# Patient Record
Sex: FEMALE | Race: WHITE | NOT HISPANIC OR LATINO | Employment: OTHER | URBAN - METROPOLITAN AREA
[De-identification: names, ages, dates, MRNs, and addresses within clinical notes are randomized per-mention and may not be internally consistent; named-entity substitution may affect disease eponyms.]

---

## 2017-01-11 ENCOUNTER — HOSPITAL ENCOUNTER (OUTPATIENT)
Dept: ONCOLOGY | Facility: CLINIC | Age: 77
Discharge: HOME | End: 2017-01-11
Attending: INTERNAL MEDICINE | Admitting: INTERNAL MEDICINE

## 2017-01-11 LAB
ALBUMIN SERPL-MCNC: 3.7 G/DL (ref 3.5–4.8)
ALBUMIN/GLOB SERPL: 1.4 {RATIO} (ref 1–1.7)
ALP SERPL-CCNC: 36 IU/L (ref 32–91)
ALT SERPL-CCNC: 13 IU/L (ref 14–54)
ANION GAP SERPL CALC-SCNC: 12.1 MMOL/L (ref 10–20)
AST SERPL-CCNC: 19 IU/L (ref 15–41)
BILIRUB SERPL-MCNC: 0.8 MG/DL (ref 0.3–1.2)
BUN SERPL-MCNC: 18 MG/DL (ref 8–20)
BUN/CREAT SERPL: 15 (ref 5.4–26.2)
CALCIUM SERPL-MCNC: 9.3 MG/DL (ref 8.9–10.3)
CHLORIDE SERPL-SCNC: 107 MMOL/L (ref 101–111)
CONV CO2: 23 MMOL/L (ref 22–32)
CONV TOTAL PROTEIN: 6.4 G/DL (ref 6.1–7.9)
CREAT UR-MCNC: 1.2 MG/DL (ref 0.4–1)
GLOBULIN UR ELPH-MCNC: 2.7 G/DL (ref 2.5–3.8)
GLUCOSE SERPL-MCNC: 90 MG/DL (ref 65–99)
POTASSIUM SERPL-SCNC: 4.1 MMOL/L (ref 3.6–5.1)
SODIUM SERPL-SCNC: 138 MMOL/L (ref 136–144)

## 2017-01-14 ENCOUNTER — HOSPITAL ENCOUNTER (OUTPATIENT)
Dept: LAB | Facility: HOSPITAL | Age: 77
Discharge: HOME | End: 2017-01-14
Attending: INTERNAL MEDICINE | Admitting: INTERNAL MEDICINE

## 2017-01-14 LAB
ANION GAP SERPL CALC-SCNC: 11.1 MMOL/L (ref 10–20)
BASOPHILS # BLD AUTO: 0 10*3/UL (ref 0–0.2)
BASOPHILS NFR BLD AUTO: 1 % (ref 0–2)
BILIRUB UR QL STRIP: NEGATIVE MG/DL
BUN SERPL-MCNC: 15 MG/DL (ref 8–20)
BUN/CREAT SERPL: 11.5 (ref 5.4–26.2)
CALCIUM SERPL-MCNC: 9.4 MG/DL (ref 8.9–10.3)
CASTS URNS QL MICRO: ABNORMAL /[LPF]
CHLORIDE SERPL-SCNC: 104 MMOL/L (ref 101–111)
COLOR UR: YELLOW
CONV BACTERIA IN URINE MICRO: NEGATIVE
CONV CLARITY OF URINE: ABNORMAL
CONV CO2: 26 MMOL/L (ref 22–32)
CONV HYALINE CASTS IN URINE MICRO: 2 /[LPF] (ref 0–5)
CONV PROTEIN IN URINE BY AUTOMATED TEST STRIP: NEGATIVE MG/DL
CONV SMALL ROUND CELLS: ABNORMAL /[HPF]
CONV UROBILINOGEN IN URINE BY AUTOMATED TEST STRIP: 0.2 MG/DL
CREAT 24H UR-MCNC: 106.1 MG/DL
CREAT UR-MCNC: 1.3 MG/DL (ref 0.4–1)
CULTURE INDICATED?: ABNORMAL
DIFFERENTIAL METHOD BLD: (no result)
EOSINOPHIL # BLD AUTO: 0.1 10*3/UL (ref 0–0.3)
EOSINOPHIL # BLD AUTO: 2 % (ref 0–3)
ERYTHROCYTE [DISTWIDTH] IN BLOOD BY AUTOMATED COUNT: 13.6 % (ref 11.5–14.5)
GLUCOSE SERPL-MCNC: 106 MG/DL (ref 65–99)
GLUCOSE UR QL: NEGATIVE MG/DL
HCT VFR BLD AUTO: 40.4 % (ref 35–49)
HGB BLD-MCNC: 13.3 G/DL (ref 12–15)
HGB UR QL STRIP: NEGATIVE
KETONES UR QL STRIP: NEGATIVE MG/DL
LEUKOCYTE ESTERASE UR QL STRIP: NEGATIVE
LYMPHOCYTES # BLD AUTO: 0.9 10*3/UL (ref 0.8–4.8)
LYMPHOCYTES NFR BLD AUTO: 21 % (ref 18–42)
MCH RBC QN AUTO: 28.9 PG (ref 26–32)
MCHC RBC AUTO-ENTMCNC: 33 G/DL (ref 32–36)
MCV RBC AUTO: 87.7 FL (ref 80–94)
MONOCYTES # BLD AUTO: 0.4 10*3/UL (ref 0.1–1.3)
MONOCYTES NFR BLD AUTO: 10 % (ref 2–11)
NEUTROPHILS # BLD AUTO: 2.9 10*3/UL (ref 2.3–8.6)
NEUTROPHILS NFR BLD AUTO: 66 % (ref 50–75)
NITRITE UR QL STRIP: NEGATIVE
NRBC BLD AUTO-RTO: 0 /100{WBCS}
NRBC/RBC NFR BLD MANUAL: 0 10*3/UL
PH UR STRIP.AUTO: 5.5 [PH] (ref 4.5–8)
PHOSPHATE SERPL-MCNC: 3.6 MG/DL (ref 2.4–4.7)
PLATELET # BLD AUTO: 186 10*3/UL (ref 150–450)
PMV BLD AUTO: 8.5 FL (ref 7.4–10.4)
POTASSIUM SERPL-SCNC: 4.1 MMOL/L (ref 3.6–5.1)
PROT UR-MCNC: 4 MG/DL
RBC # BLD AUTO: 4.61 10*6/UL (ref 4–5.4)
RBC #/AREA URNS HPF: 5 /[HPF] (ref 0–3)
SODIUM SERPL-SCNC: 137 MMOL/L (ref 136–144)
SP GR UR: 1.01 (ref 1–1.03)
SPERM URNS QL MICRO: ABNORMAL /[HPF]
SQUAMOUS SPT QL MICRO: 5 /[HPF] (ref 0–5)
UNIDENT CRYS URNS QL MICRO: ABNORMAL /[HPF]
WBC # BLD AUTO: 4.4 10*3/UL (ref 4.5–11.5)
WBC #/AREA URNS HPF: 3 /[HPF] (ref 0–5)
YEAST SPEC QL WET PREP: ABNORMAL /[HPF]

## 2017-01-16 LAB — PTH-INTACT SERPL-MCNC: 67 PG/ML (ref 11–72)

## 2017-02-09 ENCOUNTER — HOSPITAL ENCOUNTER (OUTPATIENT)
Dept: ONCOLOGY | Facility: CLINIC | Age: 77
Discharge: HOME | End: 2017-02-09
Attending: INTERNAL MEDICINE | Admitting: INTERNAL MEDICINE

## 2017-03-09 ENCOUNTER — HOSPITAL ENCOUNTER (OUTPATIENT)
Dept: ONCOLOGY | Facility: CLINIC | Age: 77
Discharge: HOME | End: 2017-03-09
Attending: INTERNAL MEDICINE | Admitting: INTERNAL MEDICINE

## 2017-04-06 ENCOUNTER — HOSPITAL ENCOUNTER (OUTPATIENT)
Dept: ONCOLOGY | Facility: CLINIC | Age: 77
Discharge: HOME | End: 2017-04-06
Attending: INTERNAL MEDICINE | Admitting: INTERNAL MEDICINE

## 2017-05-04 ENCOUNTER — HOSPITAL ENCOUNTER (OUTPATIENT)
Dept: ONCOLOGY | Facility: CLINIC | Age: 77
Discharge: HOME | End: 2017-05-04
Attending: INTERNAL MEDICINE | Admitting: INTERNAL MEDICINE

## 2017-05-15 ENCOUNTER — CONVERSION ENCOUNTER (OUTPATIENT)
Dept: SURGERY | Facility: CLINIC | Age: 77
End: 2017-05-15

## 2017-05-17 ENCOUNTER — HOSPITAL ENCOUNTER (OUTPATIENT)
Dept: CARDIOLOGY | Facility: HOSPITAL | Age: 77
Discharge: HOME | End: 2017-05-17
Attending: INTERNAL MEDICINE | Admitting: INTERNAL MEDICINE

## 2017-06-08 ENCOUNTER — HOSPITAL ENCOUNTER (OUTPATIENT)
Dept: ONCOLOGY | Facility: CLINIC | Age: 77
Discharge: HOME | End: 2017-06-08
Attending: INTERNAL MEDICINE | Admitting: INTERNAL MEDICINE

## 2017-07-06 ENCOUNTER — HOSPITAL ENCOUNTER (OUTPATIENT)
Dept: ONCOLOGY | Facility: CLINIC | Age: 77
Discharge: HOME | End: 2017-07-06
Attending: INTERNAL MEDICINE | Admitting: INTERNAL MEDICINE

## 2017-07-12 ENCOUNTER — CONVERSION ENCOUNTER (OUTPATIENT)
Dept: SURGERY | Facility: CLINIC | Age: 77
End: 2017-07-12

## 2017-07-24 ENCOUNTER — HOSPITAL ENCOUNTER (OUTPATIENT)
Dept: ONCOLOGY | Facility: CLINIC | Age: 77
Discharge: HOME | End: 2017-07-24
Attending: INTERNAL MEDICINE | Admitting: INTERNAL MEDICINE

## 2017-08-03 ENCOUNTER — HOSPITAL ENCOUNTER (OUTPATIENT)
Dept: ONCOLOGY | Facility: CLINIC | Age: 77
Discharge: HOME | End: 2017-08-03
Attending: INTERNAL MEDICINE | Admitting: INTERNAL MEDICINE

## 2017-09-07 ENCOUNTER — CLINICAL SUPPORT (OUTPATIENT)
Dept: ONCOLOGY | Facility: HOSPITAL | Age: 77
End: 2017-09-07

## 2017-09-07 ENCOUNTER — HOSPITAL ENCOUNTER (OUTPATIENT)
Dept: ONCOLOGY | Facility: CLINIC | Age: 77
Setting detail: INFUSION SERIES
Discharge: HOME | End: 2017-09-07
Attending: INTERNAL MEDICINE | Admitting: INTERNAL MEDICINE

## 2017-09-07 ENCOUNTER — HOSPITAL ENCOUNTER (OUTPATIENT)
Dept: ONCOLOGY | Facility: HOSPITAL | Age: 77
Discharge: HOME | End: 2017-09-07
Attending: INTERNAL MEDICINE | Admitting: INTERNAL MEDICINE

## 2017-09-07 NOTE — PROGRESS NOTES
PATIENTS ONCOLOGY RECORD LOCATED IN [facility]      Subjective     Name:  ORIN BELTRE     Date:  2017  Address:  [address] [place] [address] [place] [facility]  Home: [phone]  :  1940 AGE:  76 y.o.        RECORDS OBTAINED:  Patients Oncology Record is located in [facility]

## 2017-10-05 ENCOUNTER — HOSPITAL ENCOUNTER (OUTPATIENT)
Dept: ONCOLOGY | Facility: CLINIC | Age: 77
Setting detail: INFUSION SERIES
Discharge: HOME | End: 2017-10-05
Attending: INTERNAL MEDICINE | Admitting: INTERNAL MEDICINE

## 2017-10-05 ENCOUNTER — HOSPITAL ENCOUNTER (OUTPATIENT)
Dept: ONCOLOGY | Facility: HOSPITAL | Age: 77
Discharge: HOME | End: 2017-10-05
Attending: INTERNAL MEDICINE | Admitting: INTERNAL MEDICINE

## 2017-10-05 ENCOUNTER — CLINICAL SUPPORT (OUTPATIENT)
Dept: ONCOLOGY | Facility: HOSPITAL | Age: 77
End: 2017-10-05

## 2017-10-05 NOTE — PROGRESS NOTES
PATIENTS ONCOLOGY RECORD LOCATED IN [facility]      Subjective     Name:  ORIN BELTRE     Date:  10/05/2017  Address:  [address] [place] [address] [place] [facility]  Home: [phone]  :  1940 AGE:  76 y.o.        RECORDS OBTAINED:  Patients Oncology Record is located in [facility]

## 2017-10-17 ENCOUNTER — HOSPITAL ENCOUNTER (OUTPATIENT)
Dept: LAB | Facility: HOSPITAL | Age: 77
Discharge: HOME | End: 2017-10-17
Attending: INTERNAL MEDICINE | Admitting: INTERNAL MEDICINE

## 2017-10-17 LAB
ALBUMIN SERPL-MCNC: 4 G/DL (ref 3.5–4.8)
ANION GAP SERPL CALC-SCNC: 13.4 MMOL/L (ref 10–20)
BASOPHILS # BLD AUTO: 0 10*3/UL (ref 0–0.2)
BASOPHILS NFR BLD AUTO: 1 % (ref 0–2)
BILIRUB UR QL STRIP: NEGATIVE MG/DL
BUN SERPL-MCNC: 15 MG/DL (ref 8–20)
BUN/CREAT SERPL: 12.5 (ref 5.4–26.2)
CALCIUM SERPL-MCNC: 9.5 MG/DL (ref 8.9–10.3)
CASTS URNS QL MICRO: NORMAL /[LPF]
CHLORIDE SERPL-SCNC: 102 MMOL/L (ref 101–111)
COLOR UR: YELLOW
CONV BACTERIA IN URINE MICRO: NEGATIVE
CONV CLARITY OF URINE: CLEAR
CONV CO2: 25 MMOL/L (ref 22–32)
CONV HYALINE CASTS IN URINE MICRO: 0 /[LPF] (ref 0–5)
CONV PROTEIN IN URINE BY AUTOMATED TEST STRIP: NEGATIVE MG/DL
CONV SMALL ROUND CELLS: NORMAL /[HPF]
CONV UROBILINOGEN IN URINE BY AUTOMATED TEST STRIP: 0.2 MG/DL
CREAT 24H UR-MCNC: 18 MG/DL
CREAT UR-MCNC: 1.2 MG/DL (ref 0.4–1)
CULTURE INDICATED?: NORMAL
DIFFERENTIAL METHOD BLD: (no result)
EOSINOPHIL # BLD AUTO: 0.1 10*3/UL (ref 0–0.3)
EOSINOPHIL # BLD AUTO: 2 % (ref 0–3)
ERYTHROCYTE [DISTWIDTH] IN BLOOD BY AUTOMATED COUNT: 14.2 % (ref 11.5–14.5)
GLUCOSE SERPL-MCNC: 92 MG/DL (ref 65–99)
GLUCOSE UR QL: NEGATIVE MG/DL
HCT VFR BLD AUTO: 41.8 % (ref 35–49)
HGB BLD-MCNC: 13.9 G/DL (ref 12–15)
HGB UR QL STRIP: NEGATIVE
KETONES UR QL STRIP: NEGATIVE MG/DL
LEUKOCYTE ESTERASE UR QL STRIP: NEGATIVE
LYMPHOCYTES # BLD AUTO: 0.7 10*3/UL (ref 0.8–4.8)
LYMPHOCYTES NFR BLD AUTO: 14 % (ref 18–42)
MCH RBC QN AUTO: 30.1 PG (ref 26–32)
MCHC RBC AUTO-ENTMCNC: 33.3 G/DL (ref 32–36)
MCV RBC AUTO: 90.3 FL (ref 80–94)
MONOCYTES # BLD AUTO: 0.4 10*3/UL (ref 0.1–1.3)
MONOCYTES NFR BLD AUTO: 8 % (ref 2–11)
NEUTROPHILS # BLD AUTO: 4.2 10*3/UL (ref 2.3–8.6)
NEUTROPHILS NFR BLD AUTO: 75 % (ref 50–75)
NITRITE UR QL STRIP: NEGATIVE
NRBC BLD AUTO-RTO: 0 /100{WBCS}
NRBC/RBC NFR BLD MANUAL: 0 10*3/UL
PH UR STRIP.AUTO: 6 [PH] (ref 4.5–8)
PHOSPHATE SERPL-MCNC: ABNORMAL MG/DL (ref 2.4–4.7)
PLATELET # BLD AUTO: 211 10*3/UL (ref 150–450)
PMV BLD AUTO: 9.1 FL (ref 7.4–10.4)
POTASSIUM SERPL-SCNC: ABNORMAL MMOL/L (ref 3.6–5.1)
PROT UR-MCNC: <2 MG/DL
RBC # BLD AUTO: 4.63 10*6/UL (ref 4–5.4)
RBC #/AREA URNS HPF: 0 /[HPF] (ref 0–3)
SODIUM SERPL-SCNC: 136 MMOL/L (ref 136–144)
SP GR UR: 1.01 (ref 1–1.03)
SPERM URNS QL MICRO: NORMAL /[HPF]
SQUAMOUS SPT QL MICRO: 0 /[HPF] (ref 0–5)
UNIDENT CRYS URNS QL MICRO: NORMAL /[HPF]
WBC # BLD AUTO: 5.5 10*3/UL (ref 4.5–11.5)
WBC #/AREA URNS HPF: 0 /[HPF] (ref 0–5)
YEAST SPEC QL WET PREP: NORMAL /[HPF]

## 2017-11-02 ENCOUNTER — HOSPITAL ENCOUNTER (OUTPATIENT)
Dept: ONCOLOGY | Facility: HOSPITAL | Age: 77
Discharge: HOME | End: 2017-11-02
Attending: INTERNAL MEDICINE | Admitting: INTERNAL MEDICINE

## 2017-11-02 ENCOUNTER — CLINICAL SUPPORT (OUTPATIENT)
Dept: ONCOLOGY | Facility: HOSPITAL | Age: 77
End: 2017-11-02

## 2017-11-02 ENCOUNTER — HOSPITAL ENCOUNTER (OUTPATIENT)
Dept: ONCOLOGY | Facility: CLINIC | Age: 77
Setting detail: INFUSION SERIES
Discharge: HOME | End: 2017-11-02
Attending: INTERNAL MEDICINE | Admitting: INTERNAL MEDICINE

## 2017-11-02 NOTE — PROGRESS NOTES
PATIENTS ONCOLOGY RECORD LOCATED IN [facility]      Subjective     Name:  ORIN BELTRE     Date:  2017  Address:  [address] [place] [place] [address] [place] [facility]  Home: [phone]  :  1940 AGE:  76 y.o.        RECORDS OBTAINED:  Patients Oncology Record is located in [facility]

## 2017-12-07 ENCOUNTER — CLINICAL SUPPORT (OUTPATIENT)
Dept: ONCOLOGY | Facility: HOSPITAL | Age: 77
End: 2017-12-07

## 2017-12-07 ENCOUNTER — HOSPITAL ENCOUNTER (OUTPATIENT)
Dept: ONCOLOGY | Facility: CLINIC | Age: 77
Setting detail: INFUSION SERIES
Discharge: HOME | End: 2017-12-07
Attending: INTERNAL MEDICINE | Admitting: INTERNAL MEDICINE

## 2017-12-07 ENCOUNTER — HOSPITAL ENCOUNTER (OUTPATIENT)
Dept: ONCOLOGY | Facility: HOSPITAL | Age: 77
Discharge: HOME | End: 2017-12-07
Attending: INTERNAL MEDICINE | Admitting: INTERNAL MEDICINE

## 2017-12-07 NOTE — PROGRESS NOTES
PATIENTS ONCOLOGY RECORD LOCATED IN [facility]      Subjective     Name:  ORIN BELTRE     Date:  2017  Address:  [address] [place] [address] [place] [facility]  Home: [phone]  :  1940 AGE:  77 y.o.        RECORDS OBTAINED:  Patients Oncology Record is located in [facility]

## 2018-01-11 ENCOUNTER — HOSPITAL ENCOUNTER (OUTPATIENT)
Dept: ONCOLOGY | Facility: CLINIC | Age: 78
Setting detail: INFUSION SERIES
Discharge: HOME | End: 2018-01-11
Attending: INTERNAL MEDICINE | Admitting: INTERNAL MEDICINE

## 2018-01-11 ENCOUNTER — CLINICAL SUPPORT (OUTPATIENT)
Dept: ONCOLOGY | Facility: HOSPITAL | Age: 78
End: 2018-01-11

## 2018-01-11 ENCOUNTER — HOSPITAL ENCOUNTER (OUTPATIENT)
Dept: ONCOLOGY | Facility: HOSPITAL | Age: 78
Discharge: HOME | End: 2018-01-11
Attending: INTERNAL MEDICINE | Admitting: INTERNAL MEDICINE

## 2018-01-11 LAB
ALBUMIN SERPL-MCNC: 3.5 G/DL (ref 3.5–4.8)
ALBUMIN/GLOB SERPL: 1.4 {RATIO} (ref 1–1.7)
ALP SERPL-CCNC: 34 IU/L (ref 32–91)
ALT SERPL-CCNC: 13 IU/L (ref 14–54)
ANION GAP SERPL CALC-SCNC: 11.2 MMOL/L (ref 10–20)
AST SERPL-CCNC: 22 IU/L (ref 15–41)
BILIRUB SERPL-MCNC: 0.4 MG/DL (ref 0.3–1.2)
BUN SERPL-MCNC: 16 MG/DL (ref 8–20)
BUN/CREAT SERPL: 11.4 (ref 5.4–26.2)
CALCIUM SERPL-MCNC: 9.2 MG/DL (ref 8.9–10.3)
CHLORIDE SERPL-SCNC: 107 MMOL/L (ref 101–111)
CONV CO2: 26 MMOL/L (ref 22–32)
CONV TOTAL PROTEIN: 6 G/DL (ref 6.1–7.9)
CREAT UR-MCNC: 1.4 MG/DL (ref 0.4–1)
GLOBULIN UR ELPH-MCNC: 2.5 G/DL (ref 2.5–3.8)
GLUCOSE SERPL-MCNC: 82 MG/DL (ref 65–99)
POTASSIUM SERPL-SCNC: 4.2 MMOL/L (ref 3.6–5.1)
SODIUM SERPL-SCNC: 140 MMOL/L (ref 136–144)

## 2018-01-11 NOTE — PROGRESS NOTES
PATIENTS ONCOLOGY RECORD LOCATED IN [facility]      Subjective     Name:  ORIN BELTRE     Date:  2018  Address:  [address] [place] [address] [place] [facility]  Home: [phone]  :  1940 AGE:  77 y.o.        RECORDS OBTAINED:  Patients Oncology Record is located in [facility]

## 2018-03-08 ENCOUNTER — CONVERSION ENCOUNTER (OUTPATIENT)
Dept: SURGERY | Facility: CLINIC | Age: 78
End: 2018-03-08

## 2018-06-11 ENCOUNTER — HOSPITAL ENCOUNTER (OUTPATIENT)
Dept: ONCOLOGY | Facility: CLINIC | Age: 78
Setting detail: INFUSION SERIES
Discharge: HOME | End: 2018-06-11
Attending: INTERNAL MEDICINE | Admitting: INTERNAL MEDICINE

## 2018-06-11 ENCOUNTER — CLINICAL SUPPORT (OUTPATIENT)
Dept: ONCOLOGY | Facility: HOSPITAL | Age: 78
End: 2018-06-11

## 2018-07-02 ENCOUNTER — HOSPITAL ENCOUNTER (OUTPATIENT)
Dept: ONCOLOGY | Facility: HOSPITAL | Age: 78
Discharge: HOME | End: 2018-07-02
Attending: INTERNAL MEDICINE | Admitting: INTERNAL MEDICINE

## 2018-09-14 ENCOUNTER — HOSPITAL ENCOUNTER (OUTPATIENT)
Dept: ONCOLOGY | Facility: CLINIC | Age: 78
Setting detail: INFUSION SERIES
Discharge: HOME | End: 2018-09-14
Attending: INTERNAL MEDICINE | Admitting: INTERNAL MEDICINE

## 2018-09-14 ENCOUNTER — CLINICAL SUPPORT (OUTPATIENT)
Dept: ONCOLOGY | Facility: HOSPITAL | Age: 78
End: 2018-09-14

## 2018-09-14 NOTE — PROGRESS NOTES
PATIENTS ONCOLOGY RECORD LOCATED IN [facility]      Subjective     Name:  ORIN BELTRE     Date:  2018  Address:  [address] [place] [address] [place] [facility]  Home: [phone]  :  1940 AGE:  77 y.o.        RECORDS OBTAINED:  Patients Oncology Record is located in [facility]

## 2018-10-15 ENCOUNTER — CLINICAL SUPPORT (OUTPATIENT)
Dept: ONCOLOGY | Facility: HOSPITAL | Age: 78
End: 2018-10-15

## 2018-10-15 ENCOUNTER — HOSPITAL ENCOUNTER (OUTPATIENT)
Dept: ONCOLOGY | Facility: CLINIC | Age: 78
Setting detail: INFUSION SERIES
Discharge: HOME | End: 2018-10-15
Attending: INTERNAL MEDICINE | Admitting: INTERNAL MEDICINE

## 2018-10-15 NOTE — PROGRESS NOTES
PATIENTS ONCOLOGY RECORD LOCATED IN [facility]      Subjective     Name:  ORIN BELTRE     Date:  10/15/2018  Address:  [address] [place] [address] [place] [facility]  Home: [phone]  :  1940 AGE:  77 y.o.        RECORDS OBTAINED:  Patients Oncology Record is located in [facility]

## 2018-11-12 ENCOUNTER — CLINICAL SUPPORT (OUTPATIENT)
Dept: ONCOLOGY | Facility: HOSPITAL | Age: 78
End: 2018-11-12

## 2018-11-12 ENCOUNTER — HOSPITAL ENCOUNTER (OUTPATIENT)
Dept: ONCOLOGY | Facility: CLINIC | Age: 78
Setting detail: INFUSION SERIES
Discharge: HOME | End: 2018-11-12
Attending: INTERNAL MEDICINE | Admitting: INTERNAL MEDICINE

## 2018-11-12 NOTE — PROGRESS NOTES
PATIENTS ONCOLOGY RECORD LOCATED IN [facility]      Subjective     Name:  ORIN BELTRE     Date:  2018  Address:  [address] [place] [address] [place] [facility]  Home: [unfilled]  :  1940 AGE:  77 y.o.        RECORDS OBTAINED:  Patients Oncology Record is located in [facility]

## 2018-11-28 ENCOUNTER — HOSPITAL ENCOUNTER (OUTPATIENT)
Dept: CARDIOLOGY | Facility: HOSPITAL | Age: 78
Discharge: HOME | End: 2018-11-28
Attending: INTERNAL MEDICINE | Admitting: INTERNAL MEDICINE

## 2018-11-28 ENCOUNTER — CONVERSION ENCOUNTER (OUTPATIENT)
Dept: SURGERY | Facility: CLINIC | Age: 78
End: 2018-11-28

## 2018-12-05 ENCOUNTER — HOSPITAL ENCOUNTER (OUTPATIENT)
Dept: LAB | Facility: HOSPITAL | Age: 78
Discharge: HOME | End: 2018-12-05
Attending: INTERNAL MEDICINE | Admitting: INTERNAL MEDICINE

## 2018-12-05 LAB
25(OH)D3 SERPL-MCNC: 26 NG/ML (ref 30–100)
AMPICILLIN SUSC ISLT: ABNORMAL
ANION GAP SERPL CALC-SCNC: 11.3 MMOL/L (ref 10–20)
AZTREONAM SUSC ISLT: ABNORMAL
BACTERIA ISLT: ABNORMAL
BACTERIA SPEC AEROBE CULT: ABNORMAL
BASOPHILS # BLD AUTO: 0.1 10*3/UL (ref 0–0.2)
BASOPHILS NFR BLD AUTO: 1 % (ref 0–2)
BILIRUB UR QL STRIP: NEGATIVE MG/DL
BUN SERPL-MCNC: 13 MG/DL (ref 8–20)
BUN/CREAT SERPL: 10.8 (ref 5.4–26.2)
CALCIUM SERPL-MCNC: 9.4 MG/DL (ref 8.9–10.3)
CASTS URNS QL MICRO: ABNORMAL /[LPF]
CEFAZOLIN SUSC ISLT: ABNORMAL
CEFEPIME SUSC ISLT: ABNORMAL
CEFTRIAXONE SUSC ISLT: ABNORMAL
CHLORIDE SERPL-SCNC: 105 MMOL/L (ref 101–111)
CIPROFLOXACIN SUSC ISLT: ABNORMAL
COLONY COUNT: ABNORMAL
COLOR UR: YELLOW
CONV BACTERIA IN URINE MICRO: ABNORMAL
CONV CLARITY OF URINE: ABNORMAL
CONV CO2: 27 MMOL/L (ref 22–32)
CONV HYALINE CASTS IN URINE MICRO: 0 /[LPF] (ref 0–5)
CONV PROTEIN IN URINE BY AUTOMATED TEST STRIP: NEGATIVE MG/DL
CONV SMALL ROUND CELLS: ABNORMAL /[HPF]
CONV UROBILINOGEN IN URINE BY AUTOMATED TEST STRIP: 0.2 MG/DL
CREAT 24H UR-MCNC: 149.6 MG/DL
CREAT UR-MCNC: 1.2 MG/DL (ref 0.4–1)
CULTURE INDICATED?: ABNORMAL
CULTURE INDICATED?: ABNORMAL
DIFFERENTIAL METHOD BLD: (no result)
EOSINOPHIL # BLD AUTO: 0.1 10*3/UL (ref 0–0.3)
EOSINOPHIL # BLD AUTO: 3 % (ref 0–3)
ERYTHROCYTE [DISTWIDTH] IN BLOOD BY AUTOMATED COUNT: 13.8 % (ref 11.5–14.5)
GLUCOSE SERPL-MCNC: 98 MG/DL (ref 65–99)
GLUCOSE UR QL: NEGATIVE MG/DL
HBA1C MFR BLD: 5.7 % (ref 0–5.6)
HCT VFR BLD AUTO: 40.4 % (ref 35–49)
HGB BLD-MCNC: 13.7 G/DL (ref 12–15)
HGB UR QL STRIP: NEGATIVE
KETONES UR QL STRIP: NEGATIVE MG/DL
LEUKOCYTE ESTERASE UR QL STRIP: ABNORMAL
LEVOFLOXACIN SUSC ISLT: ABNORMAL
LYMPHOCYTES # BLD AUTO: 0.9 10*3/UL (ref 0.8–4.8)
LYMPHOCYTES NFR BLD AUTO: 22 % (ref 18–42)
Lab: ABNORMAL
MCH RBC QN AUTO: 30.8 PG (ref 26–32)
MCHC RBC AUTO-ENTMCNC: 34 G/DL (ref 32–36)
MCV RBC AUTO: 90.7 FL (ref 80–94)
MEROPENEM SUSC ISLT: ABNORMAL
MICRO REPORT STATUS: ABNORMAL
MONOCYTES # BLD AUTO: 0.4 10*3/UL (ref 0.1–1.3)
MONOCYTES NFR BLD AUTO: 9 % (ref 2–11)
NEUTROPHILS # BLD AUTO: 2.7 10*3/UL (ref 2.3–8.6)
NEUTROPHILS NFR BLD AUTO: 65 % (ref 50–75)
NITRITE UR QL STRIP: POSITIVE
NITROFURANTOIN SUSC ISLT: ABNORMAL
NRBC BLD AUTO-RTO: 0 /100{WBCS}
NRBC/RBC NFR BLD MANUAL: 0 10*3/UL
PH UR STRIP.AUTO: 6 [PH] (ref 4.5–8)
PHOSPHATE SERPL-MCNC: 3.7 MG/DL (ref 2.4–4.7)
PIP+TAZO SUSC ISLT: ABNORMAL
PLATELET # BLD AUTO: 194 10*3/UL (ref 150–450)
PMV BLD AUTO: 8.5 FL (ref 7.4–10.4)
POTASSIUM SERPL-SCNC: 4.3 MMOL/L (ref 3.6–5.1)
PROT UR-MCNC: 10 MG/DL
PROT/CREAT UR: 0.1 MG/MG (ref 0–22)
PTH-INTACT SERPL-MCNC: 99 PG/ML (ref 11–72)
RBC # BLD AUTO: 4.45 10*6/UL (ref 4–5.4)
RBC #/AREA URNS HPF: 0 /[HPF] (ref 0–3)
SODIUM SERPL-SCNC: 139 MMOL/L (ref 136–144)
SP GR UR: 1.02 (ref 1–1.03)
SPECIMEN SOURCE: ABNORMAL
SPERM URNS QL MICRO: ABNORMAL /[HPF]
SQUAMOUS SPT QL MICRO: 1 /[HPF] (ref 0–5)
SUSC METH SPEC: ABNORMAL
TETRACYCLINE SUSC ISLT: ABNORMAL
TOBRAMYCIN SUSC ISLT: ABNORMAL
TRIMETHOPRIM/SULFA: ABNORMAL
TSH SERPL-ACNC: 3.88 UIU/ML (ref 0.34–5.6)
UNIDENT CRYS URNS QL MICRO: ABNORMAL /[HPF]
WBC # BLD AUTO: 4.1 10*3/UL (ref 4.5–11.5)
WBC #/AREA URNS HPF: 22 /[HPF] (ref 0–5)
YEAST SPEC QL WET PREP: ABNORMAL /[HPF]

## 2018-12-13 ENCOUNTER — CLINICAL SUPPORT (OUTPATIENT)
Dept: ONCOLOGY | Facility: HOSPITAL | Age: 78
End: 2018-12-13

## 2018-12-13 ENCOUNTER — HOSPITAL ENCOUNTER (OUTPATIENT)
Dept: ONCOLOGY | Facility: CLINIC | Age: 78
Setting detail: INFUSION SERIES
Discharge: HOME | End: 2018-12-13
Attending: INTERNAL MEDICINE | Admitting: INTERNAL MEDICINE

## 2018-12-13 NOTE — PROGRESS NOTES
PATIENTS ONCOLOGY RECORD LOCATED IN [facility]      Subjective     Name:  ORIN BELTRE     Date:  2018  Address:  [address] [place] [address] [place] [facility]  Home: [unfilled]  :  1940 AGE:  78 y.o.        RECORDS OBTAINED:  Patients Oncology Record is located in [facility]

## 2019-01-03 ENCOUNTER — CONVERSION ENCOUNTER (OUTPATIENT)
Dept: SURGERY | Facility: CLINIC | Age: 79
End: 2019-01-03

## 2019-01-07 ENCOUNTER — HOSPITAL ENCOUNTER (OUTPATIENT)
Dept: ONCOLOGY | Facility: HOSPITAL | Age: 79
Discharge: HOME | End: 2019-01-07
Attending: INTERNAL MEDICINE | Admitting: INTERNAL MEDICINE

## 2019-01-07 ENCOUNTER — HOSPITAL ENCOUNTER (OUTPATIENT)
Dept: PREADMISSION TESTING | Facility: HOSPITAL | Age: 79
Discharge: HOME | End: 2019-01-07
Attending: SURGERY | Admitting: SURGERY

## 2019-01-07 ENCOUNTER — HOSPITAL ENCOUNTER (OUTPATIENT)
Dept: ONCOLOGY | Facility: CLINIC | Age: 79
Setting detail: INFUSION SERIES
Discharge: HOME | End: 2019-01-07
Attending: INTERNAL MEDICINE | Admitting: INTERNAL MEDICINE

## 2019-01-07 ENCOUNTER — CLINICAL SUPPORT (OUTPATIENT)
Dept: ONCOLOGY | Facility: HOSPITAL | Age: 79
End: 2019-01-07

## 2019-01-07 LAB
ALBUMIN SERPL-MCNC: 3.5 G/DL (ref 3.5–4.8)
ALBUMIN/GLOB SERPL: 1.5 {RATIO} (ref 1–1.7)
ALP SERPL-CCNC: 36 IU/L (ref 32–91)
ALT SERPL-CCNC: 11 IU/L (ref 14–54)
ANION GAP SERPL CALC-SCNC: 12.7 MMOL/L (ref 10–20)
ANION GAP SERPL CALC-SCNC: 14.2 MMOL/L (ref 10–20)
AST SERPL-CCNC: 22 IU/L (ref 15–41)
BASOPHILS # BLD AUTO: 0.1 10*3/UL (ref 0–0.2)
BASOPHILS NFR BLD AUTO: 1 % (ref 0–2)
BILIRUB SERPL-MCNC: 0.2 MG/DL (ref 0.3–1.2)
BUN SERPL-MCNC: 16 MG/DL (ref 8–20)
BUN SERPL-MCNC: 17 MG/DL (ref 8–20)
BUN/CREAT SERPL: 14.2 (ref 5.4–26.2)
BUN/CREAT SERPL: 16 (ref 5.4–26.2)
CALCIUM SERPL-MCNC: 8.7 MG/DL (ref 8.9–10.3)
CALCIUM SERPL-MCNC: 9.1 MG/DL (ref 8.9–10.3)
CHLORIDE SERPL-SCNC: 103 MMOL/L (ref 101–111)
CHLORIDE SERPL-SCNC: 106 MMOL/L (ref 101–111)
CONV CO2: 23 MMOL/L (ref 22–32)
CONV CO2: 24 MMOL/L (ref 22–32)
CONV TOTAL PROTEIN: 5.8 G/DL (ref 6.1–7.9)
CREAT UR-MCNC: 1 MG/DL (ref 0.4–1)
CREAT UR-MCNC: 1.2 MG/DL (ref 0.4–1)
DIFFERENTIAL METHOD BLD: (no result)
EOSINOPHIL # BLD AUTO: 0.1 10*3/UL (ref 0–0.3)
EOSINOPHIL # BLD AUTO: 1 % (ref 0–3)
ERYTHROCYTE [DISTWIDTH] IN BLOOD BY AUTOMATED COUNT: 13.7 % (ref 11.5–14.5)
GLOBULIN UR ELPH-MCNC: 2.3 G/DL (ref 2.5–3.8)
GLUCOSE SERPL-MCNC: 116 MG/DL (ref 65–99)
GLUCOSE SERPL-MCNC: 91 MG/DL (ref 65–99)
HCT VFR BLD AUTO: 41.8 % (ref 35–49)
HGB BLD-MCNC: 13.9 G/DL (ref 12–15)
LYMPHOCYTES # BLD AUTO: 1 10*3/UL (ref 0.8–4.8)
LYMPHOCYTES NFR BLD AUTO: 16 % (ref 18–42)
MCH RBC QN AUTO: 30.1 PG (ref 26–32)
MCHC RBC AUTO-ENTMCNC: 33.2 G/DL (ref 32–36)
MCV RBC AUTO: 90.8 FL (ref 80–94)
MONOCYTES # BLD AUTO: 0.4 10*3/UL (ref 0.1–1.3)
MONOCYTES NFR BLD AUTO: 7 % (ref 2–11)
NEUTROPHILS # BLD AUTO: 4.5 10*3/UL (ref 2.3–8.6)
NEUTROPHILS NFR BLD AUTO: 75 % (ref 50–75)
NRBC BLD AUTO-RTO: 0 /100{WBCS}
NRBC/RBC NFR BLD MANUAL: 0 10*3/UL
PLATELET # BLD AUTO: 209 10*3/UL (ref 150–450)
PMV BLD AUTO: 8.9 FL (ref 7.4–10.4)
POTASSIUM SERPL-SCNC: 3.7 MMOL/L (ref 3.6–5.1)
POTASSIUM SERPL-SCNC: ABNORMAL MMOL/L (ref 3.6–5.1)
RBC # BLD AUTO: 4.6 10*6/UL (ref 4–5.4)
SODIUM SERPL-SCNC: 137 MMOL/L (ref 136–144)
SODIUM SERPL-SCNC: 138 MMOL/L (ref 136–144)
WBC # BLD AUTO: 6 10*3/UL (ref 4.5–11.5)

## 2019-01-07 NOTE — PROGRESS NOTES
PATIENTS ONCOLOGY RECORD LOCATED IN [facility]      Subjective     Name:  ORIN BELTRE     Date:  2019  Address:  [address] [place] [address] [place] [facility]  Home: [unfilled]  :  1940 AGE:  78 y.o.        RECORDS OBTAINED:  Patients Oncology Record is located in [facility]

## 2019-01-10 ENCOUNTER — HOSPITAL ENCOUNTER (OUTPATIENT)
Dept: CARDIOLOGY | Facility: HOSPITAL | Age: 79
Discharge: HOME | End: 2019-01-10
Attending: SURGERY | Admitting: SURGERY

## 2019-04-17 ENCOUNTER — HOSPITAL ENCOUNTER (OUTPATIENT)
Dept: LAB | Facility: HOSPITAL | Age: 79
Discharge: HOME | End: 2019-04-17
Attending: INTERNAL MEDICINE | Admitting: INTERNAL MEDICINE

## 2019-04-17 LAB
ANION GAP SERPL CALC-SCNC: 15.2 MMOL/L (ref 10–20)
BASOPHILS # BLD AUTO: 0 10*3/UL (ref 0–0.2)
BASOPHILS NFR BLD AUTO: 1 % (ref 0–2)
BILIRUB UR QL STRIP: NEGATIVE MG/DL
BUN SERPL-MCNC: 14 MG/DL (ref 8–20)
BUN/CREAT SERPL: 10 (ref 5.4–26.2)
CALCIUM SERPL-MCNC: 9.7 MG/DL (ref 8.9–10.3)
CASTS URNS QL MICRO: NORMAL /[LPF]
CHLORIDE SERPL-SCNC: 104 MMOL/L (ref 101–111)
COLOR UR: YELLOW
CONV BACTERIA IN URINE MICRO: NEGATIVE
CONV CLARITY OF URINE: CLEAR
CONV CO2: 23 MMOL/L (ref 22–32)
CONV HYALINE CASTS IN URINE MICRO: 0 /[LPF] (ref 0–5)
CONV PROTEIN IN URINE BY AUTOMATED TEST STRIP: NEGATIVE MG/DL
CONV SMALL ROUND CELLS: NORMAL /[HPF]
CONV UROBILINOGEN IN URINE BY AUTOMATED TEST STRIP: 0.2 MG/DL
CREAT 24H UR-MCNC: 105.7 MG/DL
CREAT UR-MCNC: 1.4 MG/DL (ref 0.4–1)
CULTURE INDICATED?: NORMAL
DIFFERENTIAL METHOD BLD: (no result)
EOSINOPHIL # BLD AUTO: 0.1 10*3/UL (ref 0–0.3)
EOSINOPHIL # BLD AUTO: 1 % (ref 0–3)
ERYTHROCYTE [DISTWIDTH] IN BLOOD BY AUTOMATED COUNT: 14.4 % (ref 11.5–14.5)
GLUCOSE SERPL-MCNC: 92 MG/DL (ref 65–99)
GLUCOSE UR QL: NEGATIVE MG/DL
HCT VFR BLD AUTO: 43.4 % (ref 35–49)
HGB BLD-MCNC: 14.3 G/DL (ref 12–15)
HGB UR QL STRIP: NEGATIVE
KETONES UR QL STRIP: NEGATIVE MG/DL
LEUKOCYTE ESTERASE UR QL STRIP: NEGATIVE
LYMPHOCYTES # BLD AUTO: 0.8 10*3/UL (ref 0.8–4.8)
LYMPHOCYTES NFR BLD AUTO: 16 % (ref 18–42)
MCH RBC QN AUTO: 29.5 PG (ref 26–32)
MCHC RBC AUTO-ENTMCNC: 32.9 G/DL (ref 32–36)
MCV RBC AUTO: 89.8 FL (ref 80–94)
MONOCYTES # BLD AUTO: 0.5 10*3/UL (ref 0.1–1.3)
MONOCYTES NFR BLD AUTO: 10 % (ref 2–11)
NEUTROPHILS # BLD AUTO: 3.8 10*3/UL (ref 2.3–8.6)
NEUTROPHILS NFR BLD AUTO: 72 % (ref 50–75)
NITRITE UR QL STRIP: NEGATIVE
NRBC BLD AUTO-RTO: 0 /100{WBCS}
NRBC/RBC NFR BLD MANUAL: 0 10*3/UL
PH UR STRIP.AUTO: 5.5 [PH] (ref 4.5–8)
PHOSPHATE SERPL-MCNC: 3.6 MG/DL (ref 2.4–4.7)
PLATELET # BLD AUTO: 189 10*3/UL (ref 150–450)
PMV BLD AUTO: 8.6 FL (ref 7.4–10.4)
POTASSIUM SERPL-SCNC: 4.2 MMOL/L (ref 3.6–5.1)
PROT UR-MCNC: 5 MG/DL
PROT/CREAT UR: 0 MG/MG (ref 0–22)
RBC # BLD AUTO: 4.83 10*6/UL (ref 4–5.4)
RBC #/AREA URNS HPF: 2 /[HPF] (ref 0–3)
SODIUM SERPL-SCNC: 138 MMOL/L (ref 136–144)
SP GR UR: 1.02 (ref 1–1.03)
SPERM URNS QL MICRO: NORMAL /[HPF]
SQUAMOUS SPT QL MICRO: 0 /[HPF] (ref 0–5)
UNIDENT CRYS URNS QL MICRO: NORMAL /[HPF]
WBC # BLD AUTO: 5.3 10*3/UL (ref 4.5–11.5)
WBC #/AREA URNS HPF: 2 /[HPF] (ref 0–5)
YEAST SPEC QL WET PREP: NORMAL /[HPF]

## 2019-05-18 NOTE — PROGRESS NOTES
PATIENTS ONCOLOGY RECORD LOCATED IN [facility]      Subjective     Name:  ORIN BELTRE     Date:  2018  Address:  [address] [place] [address] [place] [facility]  Home: [phone]  :  1940 AGE:  77 y.o.        RECORDS OBTAINED:  Patients Oncology Record is located in [facility]    Symptoms

## 2019-06-04 VITALS
DIASTOLIC BLOOD PRESSURE: 67 MMHG | HEIGHT: 60 IN | SYSTOLIC BLOOD PRESSURE: 122 MMHG | WEIGHT: 169 LBS | BODY MASS INDEX: 30.43 KG/M2 | HEART RATE: 66 BPM | WEIGHT: 159.5 LBS | HEART RATE: 73 BPM | SYSTOLIC BLOOD PRESSURE: 161 MMHG | SYSTOLIC BLOOD PRESSURE: 122 MMHG | DIASTOLIC BLOOD PRESSURE: 74 MMHG | OXYGEN SATURATION: 96 % | HEART RATE: 72 BPM | DIASTOLIC BLOOD PRESSURE: 81 MMHG | OXYGEN SATURATION: 96 % | WEIGHT: 171 LBS | DIASTOLIC BLOOD PRESSURE: 68 MMHG | HEART RATE: 76 BPM | OXYGEN SATURATION: 98 % | SYSTOLIC BLOOD PRESSURE: 109 MMHG | SYSTOLIC BLOOD PRESSURE: 121 MMHG | WEIGHT: 167 LBS | OXYGEN SATURATION: 97 % | DIASTOLIC BLOOD PRESSURE: 80 MMHG | WEIGHT: 155 LBS | HEART RATE: 88 BPM | OXYGEN SATURATION: 97 %

## 2019-06-10 ENCOUNTER — HOSPITAL ENCOUNTER (OUTPATIENT)
Dept: OTHER | Facility: HOSPITAL | Age: 79
Setting detail: SPECIMEN
Discharge: HOME | End: 2019-06-10
Attending: INTERNAL MEDICINE | Admitting: INTERNAL MEDICINE

## 2019-08-16 PROBLEM — I21.3 ST ELEVATION (STEMI) MYOCARDIAL INFARCTION: Status: ACTIVE | Noted: 2019-08-16

## 2019-08-16 PROBLEM — E78.5 HYPERLIPIDEMIA: Status: ACTIVE | Noted: 2019-08-16

## 2019-08-16 PROBLEM — I10 HYPERTENSION: Status: ACTIVE | Noted: 2019-08-16

## 2019-08-16 PROBLEM — I50.9 CONGESTIVE HEART FAILURE: Status: ACTIVE | Noted: 2017-07-12

## 2019-08-16 PROBLEM — I25.10 CORONARY ARTERY DISEASE: Status: ACTIVE | Noted: 2019-08-16

## 2019-08-16 PROBLEM — R94.39 ABNORMAL CARDIOVASCULAR STRESS TEST: Status: ACTIVE | Noted: 2017-05-19

## 2019-08-16 RX ORDER — ROSUVASTATIN CALCIUM 20 MG/1
20 TABLET, COATED ORAL DAILY
COMMUNITY
Start: 2014-12-17

## 2019-08-16 RX ORDER — METFORMIN HYDROCHLORIDE EXTENDED-RELEASE TABLETS 500 MG/1
TABLET, FILM COATED, EXTENDED RELEASE ORAL
COMMUNITY
Start: 2014-12-17 | End: 2021-09-01

## 2019-08-16 RX ORDER — OXYBUTYNIN CHLORIDE 5 MG/1
5 TABLET, EXTENDED RELEASE ORAL DAILY
Refills: 5 | COMMUNITY
Start: 2019-05-16

## 2019-08-16 RX ORDER — FESOTERODINE FUMARATE 4 MG/1
4 TABLET, EXTENDED RELEASE ORAL
COMMUNITY
Start: 2014-12-17 | End: 2022-11-21

## 2019-08-16 RX ORDER — LISINOPRIL 2.5 MG/1
2.5 TABLET ORAL DAILY
COMMUNITY
Start: 2019-05-29

## 2019-08-16 RX ORDER — OMEPRAZOLE 40 MG/1
40 CAPSULE, DELAYED RELEASE ORAL DAILY
COMMUNITY
Start: 2019-05-29

## 2019-08-26 ENCOUNTER — OFFICE VISIT (OUTPATIENT)
Dept: CARDIOLOGY | Facility: CLINIC | Age: 79
End: 2019-08-26

## 2019-08-26 VITALS
OXYGEN SATURATION: 98 % | WEIGHT: 151.75 LBS | SYSTOLIC BLOOD PRESSURE: 150 MMHG | DIASTOLIC BLOOD PRESSURE: 75 MMHG | HEART RATE: 64 BPM | BODY MASS INDEX: 28.65 KG/M2 | HEIGHT: 61 IN

## 2019-08-26 DIAGNOSIS — E11.9 TYPE 2 DIABETES MELLITUS WITHOUT COMPLICATION, WITHOUT LONG-TERM CURRENT USE OF INSULIN (HCC): ICD-10-CM

## 2019-08-26 DIAGNOSIS — I10 ESSENTIAL HYPERTENSION: ICD-10-CM

## 2019-08-26 DIAGNOSIS — E78.00 PURE HYPERCHOLESTEROLEMIA: ICD-10-CM

## 2019-08-26 DIAGNOSIS — I25.118 CORONARY ARTERY DISEASE OF NATIVE ARTERY OF NATIVE HEART WITH STABLE ANGINA PECTORIS (HCC): Primary | ICD-10-CM

## 2019-08-26 PROCEDURE — 99213 OFFICE O/P EST LOW 20 MIN: CPT | Performed by: INTERNAL MEDICINE

## 2019-08-26 NOTE — PROGRESS NOTES
"    Subjective:     Encounter Date:08/26/2019      Patient ID: Goldie Mata is a 78 y.o. female.    Chief Complaint:  History of Present Illness 78-year-old white female with history of coronary artery disease history of hypertension hyperlipidemia and diabetes presents to my office for follow-up.  Patient is currently stable without any symptoms of chest pain or shortness of breath at rest on exertion.  No complaints of any PND orthopnea.  No palpitations dizziness syncope or swelling of the feet.  She is taking her medicines regularly.  She does not smoke.    The following portions of the patient's history were reviewed and updated as appropriate: allergies, current medications, past family history, past medical history, past social history, past surgical history and problem list.  Past Medical History:   Diagnosis Date   • CHF (congestive heart failure) (CMS/HCC)    • Coronary artery disease    • Hyperlipidemia    • Hypertension    • Myocardial infarction (CMS/HCC)      History reviewed. No pertinent surgical history.  /75   Pulse 64   Ht 154.9 cm (61\")   Wt 68.8 kg (151 lb 12 oz)   SpO2 98%   BMI 28.67 kg/m²   Family History   Problem Relation Age of Onset   • Heart disease Mother    • Heart disease Father    • Heart disease Sister        Current Outpatient Medications:   •  aspirin 81 MG tablet, ASPIRIN 81 MG ORAL TABLET, Disp: , Rfl:   •  fesoterodine fumarate (TOVIAZ) 4 MG tablet sustained-release 24 hour tablet, TOVIAZ 4 MG XR24H-TAB, Disp: , Rfl:   •  lisinopril (PRINIVIL,ZESTRIL) 2.5 MG tablet, , Disp: , Rfl:   •  metoprolol tartrate (LOPRESSOR) 25 MG tablet, , Disp: , Rfl:   •  omeprazole (priLOSEC) 40 MG capsule, , Disp: , Rfl:   •  oxybutynin XL (DITROPAN-XL) 5 MG 24 hr tablet, Take 5 mg by mouth Daily. 200001, Disp: , Rfl: 5  •  rosuvastatin (CRESTOR) 20 MG tablet, CRESTOR 20 MG TABS, Disp: , Rfl:   •  metFORMIN (FORTAMET) 500 MG (OSM) 24 hr tablet, METFORMIN HCL ER (OSM) 500 MG " XR24H-TAB, Disp: , Rfl:   Allergies   Allergen Reactions   • Contrast Dye Unknown (See Comments)   • Hydrocodone-Acetaminophen Unknown (See Comments)   • Penicillin G Unknown (See Comments)     Social History     Socioeconomic History   • Marital status:      Spouse name: Not on file   • Number of children: Not on file   • Years of education: Not on file   • Highest education level: Not on file   Tobacco Use   • Smoking status: Former Smoker     Review of Systems   Constitution: Negative for fever and malaise/fatigue.   Cardiovascular: Negative for chest pain, dyspnea on exertion and palpitations.   Respiratory: Negative for cough and shortness of breath.    Skin: Negative for rash.   Gastrointestinal: Negative for abdominal pain, nausea and vomiting.   Neurological: Negative for focal weakness and headaches.   All other systems reviewed and are negative.             Objective:     Physical Exam   Constitutional: She appears well-developed and well-nourished.   HENT:   Head: Normocephalic and atraumatic.   Eyes: Conjunctivae are normal. No scleral icterus.   Neck: Normal range of motion. Neck supple. No JVD present. Carotid bruit is not present.   Cardiovascular: Normal rate, regular rhythm, S1 normal, S2 normal, normal heart sounds and intact distal pulses. PMI is not displaced.   Pulmonary/Chest: Effort normal and breath sounds normal. She has no wheezes. She has no rales.   Abdominal: Soft. Bowel sounds are normal.   Neurological: She is alert. She has normal strength.   Skin: Skin is warm and dry. No rash noted.     Procedures    Lab Review:       Assessment:          Diagnosis Plan   1. Coronary artery disease of native artery of native heart with stable angina pectoris (CMS/[facility])     2. Essential hypertension     3. Pure hypercholesterolemia     4. Type 2 diabetes mellitus without complication, without long-term current use of insulin (CMS/[facility])            Plan:       Patient had history of coronary  disease followed by an MI and stent placement to the LAD.  Patient is currently stable on medications.  Patient blood pressure and heart are stable per  Patient's sugar levels and lipid levels are followed by primary care doctor.  Continue current medicines and follow in 6 months

## 2019-10-22 ENCOUNTER — TELEPHONE (OUTPATIENT)
Dept: ONCOLOGY | Facility: CLINIC | Age: 79
End: 2019-10-22

## 2019-10-22 NOTE — TELEPHONE ENCOUNTER
Ms. Mata left message asking when her next appt is.  Returned call, scheduled her for yearly follow up on 1/8/20.

## 2019-11-08 ENCOUNTER — TELEPHONE (OUTPATIENT)
Dept: ONCOLOGY | Facility: CLINIC | Age: 79
End: 2019-11-08

## 2019-11-08 NOTE — TELEPHONE ENCOUNTER
Ms. Mata left message asking when her next appts are scheduled.  Returned call.  Gave 12/5 appt info.

## 2019-11-25 ENCOUNTER — LAB (OUTPATIENT)
Dept: LAB | Facility: HOSPITAL | Age: 79
End: 2019-11-25

## 2019-11-25 ENCOUNTER — TRANSCRIBE ORDERS (OUTPATIENT)
Dept: ADMINISTRATIVE | Facility: HOSPITAL | Age: 79
End: 2019-11-25

## 2019-11-25 DIAGNOSIS — E11.8 DIABETIC COMPLICATION (HCC): ICD-10-CM

## 2019-11-25 DIAGNOSIS — I10 ESSENTIAL HYPERTENSION: ICD-10-CM

## 2019-11-25 DIAGNOSIS — N18.30 CHRONIC KIDNEY DISEASE, STAGE III (MODERATE) (HCC): Primary | ICD-10-CM

## 2019-11-25 DIAGNOSIS — R80.9 PROTEINURIA, UNSPECIFIED TYPE: ICD-10-CM

## 2019-11-25 DIAGNOSIS — E55.9 VITAMIN D DEFICIENCY, UNSPECIFIED: ICD-10-CM

## 2019-11-25 DIAGNOSIS — N18.30 CHRONIC KIDNEY DISEASE, STAGE III (MODERATE) (HCC): ICD-10-CM

## 2019-11-25 LAB
25(OH)D3 SERPL-MCNC: 27.3 NG/ML (ref 30–100)
ANION GAP SERPL CALCULATED.3IONS-SCNC: 13.6 MMOL/L (ref 5–15)
BASOPHILS # BLD AUTO: 0.03 10*3/MM3 (ref 0–0.2)
BASOPHILS NFR BLD AUTO: 0.6 % (ref 0–1.5)
BILIRUB UR QL STRIP: NEGATIVE
BUN BLD-MCNC: 21 MG/DL (ref 8–23)
BUN/CREAT SERPL: 17.4 (ref 7–25)
CALCIUM SPEC-SCNC: 9.8 MG/DL (ref 8.6–10.5)
CHLORIDE SERPL-SCNC: 105 MMOL/L (ref 98–107)
CLARITY UR: ABNORMAL
CO2 SERPL-SCNC: 26.4 MMOL/L (ref 22–29)
COLOR UR: ABNORMAL
CREAT BLD-MCNC: 1.21 MG/DL (ref 0.57–1)
CREAT UR-MCNC: 289 MG/DL
DEPRECATED RDW RBC AUTO: 40.6 FL (ref 37–54)
EOSINOPHIL # BLD AUTO: 0.09 10*3/MM3 (ref 0–0.4)
EOSINOPHIL NFR BLD AUTO: 1.9 % (ref 0.3–6.2)
ERYTHROCYTE [DISTWIDTH] IN BLOOD BY AUTOMATED COUNT: 12.4 % (ref 12.3–15.4)
GFR SERPL CREATININE-BSD FRML MDRD: 43 ML/MIN/1.73
GLUCOSE BLD-MCNC: 103 MG/DL (ref 65–99)
GLUCOSE UR STRIP-MCNC: NEGATIVE MG/DL
HBA1C MFR BLD: 5.5 % (ref 3.5–5.6)
HCT VFR BLD AUTO: 42 % (ref 34–46.6)
HGB BLD-MCNC: 14.3 G/DL (ref 12–15.9)
HGB UR QL STRIP.AUTO: NEGATIVE
IMM GRANULOCYTES # BLD AUTO: 0.01 10*3/MM3 (ref 0–0.05)
IMM GRANULOCYTES NFR BLD AUTO: 0.2 % (ref 0–0.5)
KETONES UR QL STRIP: ABNORMAL
LEUKOCYTE ESTERASE UR QL STRIP.AUTO: NEGATIVE
LYMPHOCYTES # BLD AUTO: 0.81 10*3/MM3 (ref 0.7–3.1)
LYMPHOCYTES NFR BLD AUTO: 16.8 % (ref 19.6–45.3)
MCH RBC QN AUTO: 30.7 PG (ref 26.6–33)
MCHC RBC AUTO-ENTMCNC: 34 G/DL (ref 31.5–35.7)
MCV RBC AUTO: 90.1 FL (ref 79–97)
MONOCYTES # BLD AUTO: 0.39 10*3/MM3 (ref 0.1–0.9)
MONOCYTES NFR BLD AUTO: 8.1 % (ref 5–12)
NEUTROPHILS # BLD AUTO: 3.5 10*3/MM3 (ref 1.7–7)
NEUTROPHILS NFR BLD AUTO: 72.4 % (ref 42.7–76)
NITRITE UR QL STRIP: NEGATIVE
NRBC BLD AUTO-RTO: 0 /100 WBC (ref 0–0.2)
PH UR STRIP.AUTO: 5.5 [PH] (ref 5–8)
PHOSPHATE SERPL-MCNC: 3.5 MG/DL (ref 2.5–4.5)
PLATELET # BLD AUTO: 206 10*3/MM3 (ref 140–450)
PMV BLD AUTO: 10.6 FL (ref 6–12)
POTASSIUM BLD-SCNC: 4.7 MMOL/L (ref 3.5–5.2)
PROT UR QL STRIP: ABNORMAL
PROT UR-MCNC: 18 MG/DL
PTH-INTACT SERPL-MCNC: 58.1 PG/ML (ref 15–65)
RBC # BLD AUTO: 4.66 10*6/MM3 (ref 3.77–5.28)
SODIUM BLD-SCNC: 145 MMOL/L (ref 136–145)
SP GR UR STRIP: >=1.03 (ref 1–1.03)
TSH SERPL DL<=0.05 MIU/L-ACNC: 2.65 UIU/ML (ref 0.27–4.2)
UROBILINOGEN UR QL STRIP: ABNORMAL
WBC NRBC COR # BLD: 4.83 10*3/MM3 (ref 3.4–10.8)

## 2019-11-25 PROCEDURE — 83036 HEMOGLOBIN GLYCOSYLATED A1C: CPT

## 2019-11-25 PROCEDURE — 82306 VITAMIN D 25 HYDROXY: CPT

## 2019-11-25 PROCEDURE — 81003 URINALYSIS AUTO W/O SCOPE: CPT

## 2019-11-25 PROCEDURE — 85025 COMPLETE CBC W/AUTO DIFF WBC: CPT

## 2019-11-25 PROCEDURE — 84443 ASSAY THYROID STIM HORMONE: CPT

## 2019-11-25 PROCEDURE — 36415 COLL VENOUS BLD VENIPUNCTURE: CPT

## 2019-11-25 PROCEDURE — 83970 ASSAY OF PARATHORMONE: CPT

## 2019-11-25 PROCEDURE — 82570 ASSAY OF URINE CREATININE: CPT

## 2019-11-25 PROCEDURE — 80048 BASIC METABOLIC PNL TOTAL CA: CPT

## 2019-11-25 PROCEDURE — 84156 ASSAY OF PROTEIN URINE: CPT

## 2019-11-25 PROCEDURE — 84100 ASSAY OF PHOSPHORUS: CPT

## 2019-12-05 ENCOUNTER — HOSPITAL ENCOUNTER (OUTPATIENT)
Dept: ONCOLOGY | Facility: HOSPITAL | Age: 79
Discharge: HOME | End: 2019-12-05
Admitting: INTERNAL MEDICINE

## 2019-12-05 VITALS
DIASTOLIC BLOOD PRESSURE: 58 MMHG | HEIGHT: 60 IN | SYSTOLIC BLOOD PRESSURE: 108 MMHG | HEART RATE: 76 BPM | WEIGHT: 146.4 LBS | RESPIRATION RATE: 18 BRPM | BODY MASS INDEX: 28.74 KG/M2 | TEMPERATURE: 99.1 F

## 2019-12-05 DIAGNOSIS — Z45.2 ENCOUNTER FOR CARE RELATED TO PORT-A-CATH: Primary | ICD-10-CM

## 2019-12-05 PROCEDURE — 96523 IRRIG DRUG DELIVERY DEVICE: CPT | Performed by: INTERNAL MEDICINE

## 2019-12-05 RX ORDER — SODIUM CHLORIDE 0.9 % (FLUSH) 0.9 %
20 SYRINGE (ML) INJECTION AS NEEDED
Status: DISCONTINUED | OUTPATIENT
Start: 2019-12-05 | End: 2019-12-06 | Stop reason: HOSPADM

## 2019-12-05 RX ORDER — SODIUM CHLORIDE 0.9 % (FLUSH) 0.9 %
20 SYRINGE (ML) INJECTION AS NEEDED
Status: CANCELLED | OUTPATIENT
Start: 2019-12-05

## 2019-12-05 RX ADMIN — Medication 30 ML: at 14:27

## 2019-12-05 RX ADMIN — HEPARIN 500 UNITS: 100 SYRINGE at 14:30

## 2020-01-09 ENCOUNTER — HOSPITAL ENCOUNTER (OUTPATIENT)
Dept: ONCOLOGY | Facility: HOSPITAL | Age: 80
Discharge: HOME | End: 2020-01-09
Admitting: NURSE PRACTITIONER

## 2020-01-09 VITALS
HEART RATE: 79 BPM | RESPIRATION RATE: 18 BRPM | TEMPERATURE: 98.4 F | BODY MASS INDEX: 28.47 KG/M2 | SYSTOLIC BLOOD PRESSURE: 127 MMHG | WEIGHT: 145 LBS | HEIGHT: 60 IN | DIASTOLIC BLOOD PRESSURE: 74 MMHG

## 2020-01-09 DIAGNOSIS — Z45.2 ENCOUNTER FOR CARE RELATED TO PORT-A-CATH: Primary | ICD-10-CM

## 2020-01-09 PROCEDURE — 96523 IRRIG DRUG DELIVERY DEVICE: CPT | Performed by: INTERNAL MEDICINE

## 2020-01-09 RX ORDER — HEPARIN SODIUM (PORCINE) LOCK FLUSH IV SOLN 100 UNIT/ML 100 UNIT/ML
500 SOLUTION INTRAVENOUS AS NEEDED
OUTPATIENT
Start: 2020-01-09

## 2020-01-09 RX ORDER — SODIUM CHLORIDE 0.9 % (FLUSH) 0.9 %
20 SYRINGE (ML) INJECTION AS NEEDED
Status: DISCONTINUED | OUTPATIENT
Start: 2020-01-09 | End: 2020-01-10 | Stop reason: HOSPADM

## 2020-01-09 RX ORDER — SODIUM CHLORIDE 0.9 % (FLUSH) 0.9 %
20 SYRINGE (ML) INJECTION AS NEEDED
OUTPATIENT
Start: 2020-01-09

## 2020-01-09 RX ADMIN — Medication 30 ML: at 14:20

## 2020-01-09 RX ADMIN — HEPARIN 500 UNITS: 100 SYRINGE at 14:21

## 2020-02-03 ENCOUNTER — TRANSCRIBE ORDERS (OUTPATIENT)
Dept: ADMINISTRATIVE | Facility: HOSPITAL | Age: 80
End: 2020-02-03

## 2020-02-03 ENCOUNTER — LAB (OUTPATIENT)
Dept: LAB | Facility: HOSPITAL | Age: 80
End: 2020-02-03

## 2020-02-03 DIAGNOSIS — M54.9 BACK PAIN, UNSPECIFIED BACK LOCATION, UNSPECIFIED BACK PAIN LATERALITY, UNSPECIFIED CHRONICITY: Primary | ICD-10-CM

## 2020-02-03 DIAGNOSIS — M54.9 BACK PAIN, UNSPECIFIED BACK LOCATION, UNSPECIFIED BACK PAIN LATERALITY, UNSPECIFIED CHRONICITY: ICD-10-CM

## 2020-02-03 LAB
BACTERIA UR QL AUTO: NORMAL /HPF
BILIRUB UR QL STRIP: NEGATIVE
CLARITY UR: CLEAR
COLOR UR: YELLOW
GLUCOSE UR STRIP-MCNC: NEGATIVE MG/DL
HGB UR QL STRIP.AUTO: NEGATIVE
HYALINE CASTS UR QL AUTO: NORMAL /LPF
KETONES UR QL STRIP: NEGATIVE
LEUKOCYTE ESTERASE UR QL STRIP.AUTO: ABNORMAL
NITRITE UR QL STRIP: NEGATIVE
PH UR STRIP.AUTO: 7 [PH] (ref 5–8)
PROT UR QL STRIP: NEGATIVE
RBC # UR: NORMAL /HPF
REF LAB TEST METHOD: NORMAL
SP GR UR STRIP: 1.01 (ref 1–1.03)
SQUAMOUS #/AREA URNS HPF: NORMAL /HPF
UROBILINOGEN UR QL STRIP: ABNORMAL
WBC UR QL AUTO: NORMAL /HPF

## 2020-02-03 PROCEDURE — 87086 URINE CULTURE/COLONY COUNT: CPT

## 2020-02-03 PROCEDURE — 81001 URINALYSIS AUTO W/SCOPE: CPT

## 2020-02-04 LAB — BACTERIA SPEC AEROBE CULT: NORMAL

## 2020-07-01 ENCOUNTER — OFFICE VISIT (OUTPATIENT)
Dept: CARDIOLOGY | Facility: CLINIC | Age: 80
End: 2020-07-01

## 2020-07-01 VITALS
OXYGEN SATURATION: 96 % | WEIGHT: 147 LBS | BODY MASS INDEX: 28.86 KG/M2 | SYSTOLIC BLOOD PRESSURE: 118 MMHG | HEART RATE: 63 BPM | HEIGHT: 60 IN | DIASTOLIC BLOOD PRESSURE: 65 MMHG

## 2020-07-01 DIAGNOSIS — I10 ESSENTIAL HYPERTENSION: ICD-10-CM

## 2020-07-01 DIAGNOSIS — E11.9 TYPE 2 DIABETES MELLITUS WITHOUT COMPLICATION, WITHOUT LONG-TERM CURRENT USE OF INSULIN (HCC): ICD-10-CM

## 2020-07-01 DIAGNOSIS — I25.118 CORONARY ARTERY DISEASE OF NATIVE ARTERY OF NATIVE HEART WITH STABLE ANGINA PECTORIS (HCC): ICD-10-CM

## 2020-07-01 DIAGNOSIS — E78.00 PURE HYPERCHOLESTEROLEMIA: ICD-10-CM

## 2020-07-01 DIAGNOSIS — I50.22 CHRONIC SYSTOLIC CONGESTIVE HEART FAILURE (HCC): Primary | ICD-10-CM

## 2020-07-01 PROCEDURE — 99214 OFFICE O/P EST MOD 30 MIN: CPT | Performed by: INTERNAL MEDICINE

## 2020-07-01 RX ORDER — CITALOPRAM 10 MG/1
10 TABLET ORAL DAILY
COMMUNITY
Start: 2020-05-09

## 2020-07-01 NOTE — PROGRESS NOTES
"    Subjective:     Encounter Date:07/01/2020      Patient ID: Goldie Mata is a 79 y.o. female.    Chief Complaint:  History of Present Illness 79-year-old white female with history of coronary status post embolism in the past history of congestive heart failure with LV systolic dysfunction hypertension hyperlipidemia and diabetes presents to my office for follow-up.  Patient is currently stable with absence of chest pain or shortness of breath at rest on exertion.  No complains of any PND orthopnea.  No palpitation dizziness syncope or swelling of the feet.  Patient has been taking all the medicines regularly.  Patient does not smoke.  She is quite active and exercise regularly.  She follows a good diet.    The following portions of the patient's history were reviewed and updated as appropriate: allergies, current medications, past family history, past medical history, past social history, past surgical history and problem list.  Past Medical History:   Diagnosis Date   • CHF (congestive heart failure) (CMS/HCC)    • Coronary artery disease    • Hyperlipidemia    • Hypertension    • Myocardial infarction (CMS/HCC)      History reviewed. No pertinent surgical history.  /65   Pulse 63   Ht 152.4 cm (60\")   Wt 66.7 kg (147 lb)   SpO2 96%   BMI 28.71 kg/m²   Family History   Problem Relation Age of Onset   • Heart disease Mother    • Heart disease Father    • Heart disease Sister        Current Outpatient Medications:   •  aspirin 81 MG tablet, ASPIRIN 81 MG ORAL TABLET, Disp: , Rfl:   •  citalopram (CeleXA) 10 MG tablet, , Disp: , Rfl:   •  fesoterodine fumarate (TOVIAZ) 4 MG tablet sustained-release 24 hour tablet, TOVIAZ 4 MG XR24H-TAB, Disp: , Rfl:   •  lisinopril (PRINIVIL,ZESTRIL) 2.5 MG tablet, , Disp: , Rfl:   •  metFORMIN (FORTAMET) 500 MG (OSM) 24 hr tablet, METFORMIN HCL ER (OSM) 500 MG XR24H-TAB, Disp: , Rfl:   •  metoprolol tartrate (LOPRESSOR) 25 MG tablet, , Disp: , Rfl:   •  omeprazole " (priLOSEC) 40 MG capsule, , Disp: , Rfl:   •  oxybutynin XL (DITROPAN-XL) 5 MG 24 hr tablet, Take 5 mg by mouth Daily. 200001, Disp: , Rfl: 5  •  rosuvastatin (CRESTOR) 20 MG tablet, CRESTOR 20 MG TABS, Disp: , Rfl:   Allergies   Allergen Reactions   • Contrast Dye Unknown (See Comments)   • Hydrocodone-Acetaminophen Unknown (See Comments)   • Penicillin G Unknown (See Comments)     Social History     Socioeconomic History   • Marital status:      Spouse name: Not on file   • Number of children: Not on file   • Years of education: Not on file   • Highest education level: Not on file   Tobacco Use   • Smoking status: Former Smoker   • Smokeless tobacco: Never Used     Review of Systems   Constitution: Negative for fever and malaise/fatigue.   HENT: Negative for ear pain and nosebleeds.    Eyes: Negative for blurred vision and double vision.   Cardiovascular: Negative for chest pain, dyspnea on exertion, leg swelling and palpitations.   Respiratory: Negative for cough and shortness of breath.    Skin: Negative for rash.   Musculoskeletal: Negative for joint pain.   Gastrointestinal: Negative for abdominal pain, nausea and vomiting.   Neurological: Negative for focal weakness, headaches, light-headedness and numbness.   Psychiatric/Behavioral: Negative for depression. The patient is not nervous/anxious.    All other systems reviewed and are negative.             Objective:     Physical Exam   Constitutional: She appears well-developed and well-nourished.   HENT:   Head: Normocephalic and atraumatic.   Eyes: Pupils are equal, round, and reactive to light. Conjunctivae and EOM are normal. No scleral icterus.   Neck: Normal range of motion. Neck supple. No JVD present. Carotid bruit is not present.   Cardiovascular: Normal rate, regular rhythm, S1 normal, S2 normal, normal heart sounds and intact distal pulses. PMI is not displaced.   Pulmonary/Chest: Effort normal and breath sounds normal. She has no wheezes. She  has no rales.   Abdominal: Soft. Bowel sounds are normal.   Musculoskeletal: Normal range of motion.   Neurological: She is alert. She has normal strength.   No focal deficits   Skin: Skin is warm and dry. No rash noted.   Psychiatric: She has a normal mood and affect.     Procedures    Lab Review:       Assessment:          Diagnosis Plan   1. Chronic systolic congestive heart failure (CMS/[facility])     2. Coronary artery disease of native artery of native heart with stable angina pectoris (CMS/[facility])     3. Essential hypertension     4. Pure hypercholesterolemia     5. Type 2 diabetes mellitus without complication, without long-term current use of insulin (CMS/[facility])            Plan:       Patient has history of coronary disease and is status post and placement is currently stable on medical therapy  Patient has congestive heart rate with LV systolic dysfunction is currently stable  Patient's blood pressure and heart rate are stable  Patient lipid levels are followed by the primary care doctor  Patient also has diabetes and is followed by the primary care doctor and is on oral medicines  Continue current medicines and follow her in 6-month

## 2021-02-24 ENCOUNTER — OFFICE VISIT (OUTPATIENT)
Dept: CARDIOLOGY | Facility: CLINIC | Age: 81
End: 2021-02-24

## 2021-02-24 VITALS
DIASTOLIC BLOOD PRESSURE: 81 MMHG | HEART RATE: 62 BPM | TEMPERATURE: 97.1 F | HEIGHT: 60 IN | SYSTOLIC BLOOD PRESSURE: 139 MMHG | WEIGHT: 144 LBS | BODY MASS INDEX: 28.27 KG/M2 | OXYGEN SATURATION: 98 %

## 2021-02-24 DIAGNOSIS — I25.118 CORONARY ARTERY DISEASE OF NATIVE ARTERY OF NATIVE HEART WITH STABLE ANGINA PECTORIS (HCC): ICD-10-CM

## 2021-02-24 DIAGNOSIS — I10 ESSENTIAL HYPERTENSION: ICD-10-CM

## 2021-02-24 DIAGNOSIS — I50.22 CHRONIC SYSTOLIC CONGESTIVE HEART FAILURE (HCC): Primary | ICD-10-CM

## 2021-02-24 DIAGNOSIS — E78.00 PURE HYPERCHOLESTEROLEMIA: ICD-10-CM

## 2021-02-24 DIAGNOSIS — E11.9 TYPE 2 DIABETES MELLITUS WITHOUT COMPLICATION, WITHOUT LONG-TERM CURRENT USE OF INSULIN (HCC): ICD-10-CM

## 2021-02-24 PROCEDURE — 99214 OFFICE O/P EST MOD 30 MIN: CPT | Performed by: INTERNAL MEDICINE

## 2021-02-24 NOTE — PROGRESS NOTES
"    Subjective:     Encounter Date:02/24/2021      Patient ID: Goldie Mata is a 80 y.o. female.    Chief Complaint:  History of Present Illness 80-year-old white female with history of coronary disease congestive heart failure hypertension hyperlipidemia diabetes presents to my office for follow-up.  Patient is currently stable without any symptoms of chest pain or shortness of breath at rest on exertion.  No complaints any PND orthopnea.  No palpitation dizziness syncope or swelling of the feet.  Patient has been taking all her meds regularly.  Patient does not smoke.  Patient is trying to exercise regularly patient follows a good diet    The following portions of the patient's history were reviewed and updated as appropriate: allergies, current medications, past family history, past medical history, past social history, past surgical history and problem list.  Past Medical History:   Diagnosis Date   • CHF (congestive heart failure) (CMS/HCC)    • Coronary artery disease    • Hyperlipidemia    • Hypertension    • Myocardial infarction (CMS/HCC)      History reviewed. No pertinent surgical history.  /81 (BP Location: Left arm, Patient Position: Sitting)   Pulse 62   Temp 97.1 °F (36.2 °C)   Ht 152.4 cm (60\")   Wt 65.3 kg (144 lb)   SpO2 98%   BMI 28.12 kg/m²   Family History   Problem Relation Age of Onset   • Heart disease Mother    • Heart disease Father    • Heart disease Sister        Current Outpatient Medications:   •  aspirin 81 MG tablet, ASPIRIN 81 MG ORAL TABLET, Disp: , Rfl:   •  citalopram (CeleXA) 10 MG tablet, , Disp: , Rfl:   •  fesoterodine fumarate (TOVIAZ) 4 MG tablet sustained-release 24 hour tablet, TOVIAZ 4 MG XR24H-TAB, Disp: , Rfl:   •  lisinopril (PRINIVIL,ZESTRIL) 2.5 MG tablet, , Disp: , Rfl:   •  metFORMIN (FORTAMET) 500 MG (OSM) 24 hr tablet, METFORMIN HCL ER (OSM) 500 MG XR24H-TAB, Disp: , Rfl:   •  metoprolol tartrate (LOPRESSOR) 25 MG tablet, , Disp: , Rfl:   •  " omeprazole (priLOSEC) 40 MG capsule, , Disp: , Rfl:   •  oxybutynin XL (DITROPAN-XL) 5 MG 24 hr tablet, Take 5 mg by mouth Daily. 200001, Disp: , Rfl: 5  •  rosuvastatin (CRESTOR) 20 MG tablet, CRESTOR 20 MG TABS, Disp: , Rfl:   Allergies   Allergen Reactions   • Contrast Dye Unknown (See Comments)   • Hydrocodone-Acetaminophen Unknown (See Comments)   • Penicillin G Unknown (See Comments)     Social History     Socioeconomic History   • Marital status:      Spouse name: Not on file   • Number of children: Not on file   • Years of education: Not on file   • Highest education level: Not on file   Tobacco Use   • Smoking status: Former Smoker   • Smokeless tobacco: Never Used     Review of Systems   Constitution: Negative for fever and malaise/fatigue.   Cardiovascular: Negative for chest pain, dyspnea on exertion and palpitations.   Respiratory: Negative for cough and shortness of breath.    Skin: Negative for rash.   Gastrointestinal: Negative for abdominal pain, nausea and vomiting.   Neurological: Negative for focal weakness and headaches.   All other systems reviewed and are negative.             Objective:     Constitutional:       Appearance: Well-developed.   Eyes:      General: No scleral icterus.     Conjunctiva/sclera: Conjunctivae normal.   HENT:      Head: Normocephalic and atraumatic.   Neck:      Musculoskeletal: Normal range of motion and neck supple.      Vascular: No carotid bruit or JVD.   Pulmonary:      Effort: Pulmonary effort is normal.      Breath sounds: Normal breath sounds. No wheezing. No rales.   Cardiovascular:      Normal rate. Regular rhythm.      Murmurs: There is a systolic murmur.   Pulses:     Intact distal pulses.   Abdominal:      General: Bowel sounds are normal.      Palpations: Abdomen is soft.   Skin:     General: Skin is warm and dry.      Findings: No rash.   Neurological:      Mental Status: Alert.       Procedures    Lab Review:         MDM  1.  Coronary artery  disease  Patient has nonobstructive coronary disease without any symptoms at this time and will continue on medical therapy  2.  Congestive heart failure  Patient has LV systolic dysfunction and I will check an echocardiogram for LV function at her next visit  3.  Hypertension  Patient blood pressures currently stable on medications  4.  Hyperlipidemia  Patient's lipid levels are followed by the primary care doctor.  5.  Diabetes  Patient is currently on oral medicines  Continue current medicines and follow in 6 months.

## 2021-09-01 ENCOUNTER — HOSPITAL ENCOUNTER (OUTPATIENT)
Dept: CARDIOLOGY | Facility: HOSPITAL | Age: 81
Discharge: HOME | End: 2021-09-01
Admitting: INTERNAL MEDICINE

## 2021-09-01 ENCOUNTER — OFFICE VISIT (OUTPATIENT)
Dept: CARDIOLOGY | Facility: CLINIC | Age: 81
End: 2021-09-01

## 2021-09-01 VITALS
OXYGEN SATURATION: 99 % | SYSTOLIC BLOOD PRESSURE: 144 MMHG | BODY MASS INDEX: 29.7 KG/M2 | DIASTOLIC BLOOD PRESSURE: 70 MMHG | WEIGHT: 151.25 LBS | HEART RATE: 66 BPM | HEIGHT: 60 IN

## 2021-09-01 VITALS
WEIGHT: 144 LBS | BODY MASS INDEX: 28.27 KG/M2 | DIASTOLIC BLOOD PRESSURE: 63 MMHG | SYSTOLIC BLOOD PRESSURE: 138 MMHG | HEIGHT: 60 IN

## 2021-09-01 DIAGNOSIS — I25.118 CORONARY ARTERY DISEASE OF NATIVE ARTERY OF NATIVE HEART WITH STABLE ANGINA PECTORIS (HCC): ICD-10-CM

## 2021-09-01 DIAGNOSIS — E78.00 PURE HYPERCHOLESTEROLEMIA: ICD-10-CM

## 2021-09-01 DIAGNOSIS — E11.9 TYPE 2 DIABETES MELLITUS WITHOUT COMPLICATION, WITHOUT LONG-TERM CURRENT USE OF INSULIN (HCC): ICD-10-CM

## 2021-09-01 DIAGNOSIS — I10 ESSENTIAL HYPERTENSION: ICD-10-CM

## 2021-09-01 DIAGNOSIS — I50.22 CHRONIC SYSTOLIC CONGESTIVE HEART FAILURE (HCC): Primary | ICD-10-CM

## 2021-09-01 DIAGNOSIS — I50.22 CHRONIC SYSTOLIC CONGESTIVE HEART FAILURE (HCC): ICD-10-CM

## 2021-09-01 LAB
BH CV ECHO MEAS - ACS: 2.2 CM
BH CV ECHO MEAS - AI DEC SLOPE: 352.8 CM/SEC^2
BH CV ECHO MEAS - AI DEC TIME: 1.3 SEC
BH CV ECHO MEAS - AI MAX PG: 83.2 MMHG
BH CV ECHO MEAS - AI MAX VEL: 455.5 CM/SEC
BH CV ECHO MEAS - AI P1/2T: 378.1 MSEC
BH CV ECHO MEAS - AO MAX PG (FULL): 3.5 MMHG
BH CV ECHO MEAS - AO MAX PG: 5.9 MMHG
BH CV ECHO MEAS - AO MEAN PG (FULL): 1.8 MMHG
BH CV ECHO MEAS - AO MEAN PG: 3.3 MMHG
BH CV ECHO MEAS - AO ROOT AREA (BSA CORRECTED): 2
BH CV ECHO MEAS - AO ROOT AREA: 8.5 CM^2
BH CV ECHO MEAS - AO ROOT DIAM: 3.3 CM
BH CV ECHO MEAS - AO V2 MAX: 121.9 CM/SEC
BH CV ECHO MEAS - AO V2 MEAN: 86.5 CM/SEC
BH CV ECHO MEAS - AO V2 VTI: 25.7 CM
BH CV ECHO MEAS - ASC AORTA: 3.4 CM
BH CV ECHO MEAS - AVA(I,A): 1.9 CM^2
BH CV ECHO MEAS - AVA(I,D): 1.9 CM^2
BH CV ECHO MEAS - AVA(V,A): 1.8 CM^2
BH CV ECHO MEAS - AVA(V,D): 1.8 CM^2
BH CV ECHO MEAS - BSA(HAYCOCK): 1.7 M^2
BH CV ECHO MEAS - BSA: 1.6 M^2
BH CV ECHO MEAS - BZI_BMI: 28.1 KILOGRAMS/M^2
BH CV ECHO MEAS - BZI_METRIC_HEIGHT: 152.4 CM
BH CV ECHO MEAS - BZI_METRIC_WEIGHT: 65.3 KG
BH CV ECHO MEAS - EDV(CUBED): 145.1 ML
BH CV ECHO MEAS - EDV(MOD-SP4): 75.8 ML
BH CV ECHO MEAS - EDV(TEICH): 132.7 ML
BH CV ECHO MEAS - EF(CUBED): 43.7 %
BH CV ECHO MEAS - EF(MOD-SP4): 35.3 %
BH CV ECHO MEAS - EF(TEICH): 36.1 %
BH CV ECHO MEAS - ESV(CUBED): 81.7 ML
BH CV ECHO MEAS - ESV(MOD-SP4): 49 ML
BH CV ECHO MEAS - ESV(TEICH): 84.9 ML
BH CV ECHO MEAS - FS: 17.4 %
BH CV ECHO MEAS - IVS/LVPW: 0.73
BH CV ECHO MEAS - IVSD: 0.78 CM
BH CV ECHO MEAS - LA DIMENSION: 4.4 CM
BH CV ECHO MEAS - LA/AO: 1.3
BH CV ECHO MEAS - LV DIASTOLIC VOL/BSA (35-75): 46.7 ML/M^2
BH CV ECHO MEAS - LV MASS(C)D: 177.6 GRAMS
BH CV ECHO MEAS - LV MASS(C)DI: 109.4 GRAMS/M^2
BH CV ECHO MEAS - LV MAX PG: 2.5 MMHG
BH CV ECHO MEAS - LV MEAN PG: 1.5 MMHG
BH CV ECHO MEAS - LV SYSTOLIC VOL/BSA (12-30): 30.2 ML/M^2
BH CV ECHO MEAS - LV V1 MAX: 78.6 CM/SEC
BH CV ECHO MEAS - LV V1 MEAN: 60 CM/SEC
BH CV ECHO MEAS - LV V1 VTI: 17 CM
BH CV ECHO MEAS - LVIDD: 5.3 CM
BH CV ECHO MEAS - LVIDS: 4.3 CM
BH CV ECHO MEAS - LVOT AREA: 2.9 CM^2
BH CV ECHO MEAS - LVOT DIAM: 1.9 CM
BH CV ECHO MEAS - LVPWD: 1.1 CM
BH CV ECHO MEAS - MV A MAX VEL: 98.8 CM/SEC
BH CV ECHO MEAS - MV DEC SLOPE: 187.4 CM/SEC^2
BH CV ECHO MEAS - MV DEC TIME: 0.25 SEC
BH CV ECHO MEAS - MV E MAX VEL: 47.5 CM/SEC
BH CV ECHO MEAS - MV E/A: 0.48
BH CV ECHO MEAS - MV MAX PG: 3.5 MMHG
BH CV ECHO MEAS - MV MEAN PG: 1.2 MMHG
BH CV ECHO MEAS - MV V2 MAX: 93.4 CM/SEC
BH CV ECHO MEAS - MV V2 MEAN: 49.2 CM/SEC
BH CV ECHO MEAS - MV V2 VTI: 23.3 CM
BH CV ECHO MEAS - MVA(VTI): 2.1 CM^2
BH CV ECHO MEAS - PA ACC TIME: 0.1 SEC
BH CV ECHO MEAS - PA MAX PG (FULL): 0.86 MMHG
BH CV ECHO MEAS - PA MAX PG: 3.3 MMHG
BH CV ECHO MEAS - PA PR(ACCEL): 32.3 MMHG
BH CV ECHO MEAS - PA V2 MAX: 90.4 CM/SEC
BH CV ECHO MEAS - PI MAX PG: 18.8 MMHG
BH CV ECHO MEAS - PI MAX VEL: 217 CM/SEC
BH CV ECHO MEAS - RAP SYSTOLE: 3 MMHG
BH CV ECHO MEAS - RV MAX PG: 2.4 MMHG
BH CV ECHO MEAS - RV MEAN PG: 1.3 MMHG
BH CV ECHO MEAS - RV V1 MAX: 77.5 CM/SEC
BH CV ECHO MEAS - RV V1 MEAN: 55 CM/SEC
BH CV ECHO MEAS - RV V1 VTI: 18.8 CM
BH CV ECHO MEAS - RVDD: 2.5 CM
BH CV ECHO MEAS - RVSP: 13.3 MMHG
BH CV ECHO MEAS - SI(AO): 134.3 ML/M^2
BH CV ECHO MEAS - SI(CUBED): 39.1 ML/M^2
BH CV ECHO MEAS - SI(LVOT): 30 ML/M^2
BH CV ECHO MEAS - SI(MOD-SP4): 16.5 ML/M^2
BH CV ECHO MEAS - SI(TEICH): 29.5 ML/M^2
BH CV ECHO MEAS - SV(AO): 218.1 ML
BH CV ECHO MEAS - SV(CUBED): 63.4 ML
BH CV ECHO MEAS - SV(LVOT): 48.7 ML
BH CV ECHO MEAS - SV(MOD-SP4): 26.8 ML
BH CV ECHO MEAS - SV(TEICH): 47.8 ML
BH CV ECHO MEAS - TR MAX VEL: 160.3 CM/SEC
MAXIMAL PREDICTED HEART RATE: 138 BPM
STRESS TARGET HR: 117 BPM

## 2021-09-01 PROCEDURE — 93306 TTE W/DOPPLER COMPLETE: CPT | Performed by: INTERNAL MEDICINE

## 2021-09-01 PROCEDURE — 93306 TTE W/DOPPLER COMPLETE: CPT

## 2021-09-01 PROCEDURE — 99214 OFFICE O/P EST MOD 30 MIN: CPT | Performed by: INTERNAL MEDICINE

## 2021-09-01 RX ORDER — ACETAMINOPHEN 160 MG
2000 TABLET,DISINTEGRATING ORAL 2 TIMES DAILY
COMMUNITY

## 2021-09-01 NOTE — PROGRESS NOTES
"    Subjective:     Encounter Date:09/01/2021      Patient ID: Goldie Mata is a 80 y.o. female.    Chief Complaint: Chronic systolic congestive heart failure  History of Present Illness 80-year-old white female with history of coronary disease history of congestive heart failure hypertension hyperlipidemia diabetes in the past presents to my office for follow-up.  Patient is currently stable without any symptoms of chest pain or shortness of breath at rest on exertion.  No complaints PND orthopnea.  No palpitation dizziness syncope or swelling of the feet.  Patient has been taking all the medicines regularly.  Patient does not smoke.  Patient is clinically regular.  Follows a good diet.    The following portions of the patient's history were reviewed and updated as appropriate: allergies, current medications, past family history, past medical history, past social history, past surgical history and problem list.  Past Medical History:   Diagnosis Date   • CHF (congestive heart failure) (CMS/HCC)    • Coronary artery disease    • Hyperlipidemia    • Hypertension    • Myocardial infarction (CMS/HCC)      History reviewed. No pertinent surgical history.  /70 (BP Location: Right arm, Patient Position: Sitting, Cuff Size: Adult)   Pulse 66   Ht 152.4 cm (60\")   Wt 68.6 kg (151 lb 4 oz)   SpO2 99%   BMI 29.54 kg/m²   Family History   Problem Relation Age of Onset   • Heart disease Mother    • Heart disease Father    • Heart disease Sister        Current Outpatient Medications:   •  aspirin 81 MG tablet, Take 81 mg by mouth Daily., Disp: , Rfl:   •  Cholecalciferol (Vitamin D3) 50 MCG (2000 UT) capsule, Take 2,000 Units by mouth Daily., Disp: , Rfl:   •  citalopram (CeleXA) 10 MG tablet, Take 10 mg by mouth Daily., Disp: , Rfl:   •  Cyanocobalamin 1000 MCG/ML kit, Inject  as directed Every 30 (Thirty) Days., Disp: , Rfl:   •  fesoterodine fumarate (TOVIAZ) 4 MG tablet sustained-release 24 hour tablet, 4 mg " Daily., Disp: , Rfl:   •  lisinopril (PRINIVIL,ZESTRIL) 2.5 MG tablet, Take 2.5 mg by mouth Daily., Disp: , Rfl:   •  metoprolol tartrate (LOPRESSOR) 25 MG tablet, Take 25 mg by mouth Daily., Disp: , Rfl:   •  omeprazole (priLOSEC) 40 MG capsule, Take 40 mg by mouth Daily., Disp: , Rfl:   •  oxybutynin XL (DITROPAN-XL) 5 MG 24 hr tablet, Take 5 mg by mouth Daily. 200001, Disp: , Rfl: 5  •  rosuvastatin (CRESTOR) 20 MG tablet, Take 20 mg by mouth Daily., Disp: , Rfl:   Allergies   Allergen Reactions   • Contrast Dye Unknown (See Comments)   • Hydrocodone-Acetaminophen Unknown (See Comments)   • Penicillin G Unknown (See Comments)     Social History     Socioeconomic History   • Marital status:      Spouse name: Not on file   • Number of children: Not on file   • Years of education: Not on file   • Highest education level: Not on file   Tobacco Use   • Smoking status: Former Smoker   • Smokeless tobacco: Never Used   Vaping Use   • Vaping Use: Never used   Substance and Sexual Activity   • Alcohol use: Not Currently   • Drug use: Never   • Sexual activity: Defer     Review of Systems   Constitutional: Negative for fever and malaise/fatigue.   HENT: Negative for congestion and hearing loss.    Eyes: Negative for double vision and visual disturbance.   Cardiovascular: Negative for chest pain, claudication, dyspnea on exertion, leg swelling and syncope.   Respiratory: Negative for cough and shortness of breath.    Endocrine: Negative for cold intolerance.   Skin: Negative for color change and rash.   Musculoskeletal: Negative for arthritis and joint pain.   Gastrointestinal: Negative for abdominal pain and heartburn.   Genitourinary: Negative for hematuria.   Neurological: Negative for excessive daytime sleepiness and dizziness.   Psychiatric/Behavioral: Negative for depression. The patient is not nervous/anxious.    All other systems reviewed and are negative.             Objective:     Constitutional:        Appearance: Well-developed.   Eyes:      General: No scleral icterus.     Conjunctiva/sclera: Conjunctivae normal.   HENT:      Head: Normocephalic and atraumatic.   Neck:      Vascular: No carotid bruit or JVD.   Pulmonary:      Effort: Pulmonary effort is normal.      Breath sounds: Normal breath sounds. No wheezing. No rales.   Cardiovascular:      Normal rate. Regular rhythm.   Pulses:     Intact distal pulses.   Abdominal:      General: Bowel sounds are normal.      Palpations: Abdomen is soft.   Musculoskeletal:      Cervical back: Normal range of motion and neck supple. Skin:     General: Skin is warm and dry.      Findings: No rash.   Neurological:      Mental Status: Alert.       Procedures    Lab Review:         MDM  1.  Coronary disease  Patient MI and stent placement to the LAD in the past and is currently stable on medication  2.  Congestive heart failure  Patient had LV ejection fraction of 30% in the past following an MI and is having an echocardiogram performed today and is currently on medical therapy  3.  Hypertension  Patient blood pressure currently stable on lisinopril and metoprolol  4 hyperlipidemia  Patient is currently on Crestor and her lipids are followed by the primary care doctor.      Patient's previous medical records, labs, and EKG were reviewed and discussed with the patient at today's visit.

## 2021-09-05 ENCOUNTER — HOSPITAL ENCOUNTER (OUTPATIENT)
Facility: HOSPITAL | Age: 81
Setting detail: OBSERVATION
Discharge: HOME | End: 2021-09-06
Attending: EMERGENCY MEDICINE | Admitting: HOSPITALIST

## 2021-09-05 ENCOUNTER — APPOINTMENT (OUTPATIENT)
Dept: GENERAL RADIOLOGY | Facility: HOSPITAL | Age: 81
End: 2021-09-05

## 2021-09-05 DIAGNOSIS — R07.9 CHEST PAIN, UNSPECIFIED TYPE: Primary | ICD-10-CM

## 2021-09-05 LAB
ANION GAP SERPL CALCULATED.3IONS-SCNC: 11 MMOL/L (ref 5–15)
APTT PPP: 31.5 SECONDS (ref 24–31)
BASOPHILS # BLD AUTO: 0.1 10*3/MM3 (ref 0–0.2)
BASOPHILS NFR BLD AUTO: 1 % (ref 0–1.5)
BUN SERPL-MCNC: 15 MG/DL (ref 8–23)
BUN/CREAT SERPL: 13.8 (ref 7–25)
CALCIUM SPEC-SCNC: 9 MG/DL (ref 8.6–10.5)
CHLORIDE SERPL-SCNC: 101 MMOL/L (ref 98–107)
CO2 SERPL-SCNC: 26 MMOL/L (ref 22–29)
CREAT SERPL-MCNC: 1.09 MG/DL (ref 0.57–1)
DEPRECATED RDW RBC AUTO: 42.9 FL (ref 37–54)
EOSINOPHIL # BLD AUTO: 0.1 10*3/MM3 (ref 0–0.4)
EOSINOPHIL NFR BLD AUTO: 0.9 % (ref 0.3–6.2)
ERYTHROCYTE [DISTWIDTH] IN BLOOD BY AUTOMATED COUNT: 14.2 % (ref 12.3–15.4)
GFR SERPL CREATININE-BSD FRML MDRD: 48 ML/MIN/1.73
GLUCOSE SERPL-MCNC: 119 MG/DL (ref 65–99)
HCT VFR BLD AUTO: 37.6 % (ref 34–46.6)
HGB BLD-MCNC: 12.8 G/DL (ref 12–15.9)
INR PPP: 0.95 (ref 0.93–1.1)
LYMPHOCYTES # BLD AUTO: 0.5 10*3/MM3 (ref 0.7–3.1)
LYMPHOCYTES NFR BLD AUTO: 7.5 % (ref 19.6–45.3)
MCH RBC QN AUTO: 30.1 PG (ref 26.6–33)
MCHC RBC AUTO-ENTMCNC: 34.1 G/DL (ref 31.5–35.7)
MCV RBC AUTO: 88.3 FL (ref 79–97)
MONOCYTES # BLD AUTO: 0.4 10*3/MM3 (ref 0.1–0.9)
MONOCYTES NFR BLD AUTO: 5.3 % (ref 5–12)
NEUTROPHILS NFR BLD AUTO: 5.7 10*3/MM3 (ref 1.7–7)
NEUTROPHILS NFR BLD AUTO: 85.3 % (ref 42.7–76)
NRBC BLD AUTO-RTO: 0.1 /100 WBC (ref 0–0.2)
PLATELET # BLD AUTO: 183 10*3/MM3 (ref 140–450)
PMV BLD AUTO: 7.9 FL (ref 6–12)
POTASSIUM SERPL-SCNC: 4 MMOL/L (ref 3.5–5.2)
PROTHROMBIN TIME: 10.6 SECONDS (ref 9.6–11.7)
RBC # BLD AUTO: 4.26 10*6/MM3 (ref 3.77–5.28)
SARS-COV-2 RNA PNL SPEC NAA+PROBE: NOT DETECTED
SODIUM SERPL-SCNC: 138 MMOL/L (ref 136–145)
TROPONIN T SERPL-MCNC: <0.01 NG/ML (ref 0–0.03)
WBC # BLD AUTO: 6.7 10*3/MM3 (ref 3.4–10.8)

## 2021-09-05 PROCEDURE — 71045 X-RAY EXAM CHEST 1 VIEW: CPT

## 2021-09-05 PROCEDURE — 87635 SARS-COV-2 COVID-19 AMP PRB: CPT | Performed by: EMERGENCY MEDICINE

## 2021-09-05 PROCEDURE — 84484 ASSAY OF TROPONIN QUANT: CPT | Performed by: EMERGENCY MEDICINE

## 2021-09-05 PROCEDURE — 99219 PR INITIAL OBSERVATION CARE/DAY 50 MINUTES: CPT | Performed by: HOSPITALIST

## 2021-09-05 PROCEDURE — C9803 HOPD COVID-19 SPEC COLLECT: HCPCS

## 2021-09-05 PROCEDURE — 93005 ELECTROCARDIOGRAM TRACING: CPT

## 2021-09-05 PROCEDURE — 99214 OFFICE O/P EST MOD 30 MIN: CPT | Performed by: INTERNAL MEDICINE

## 2021-09-05 PROCEDURE — 85730 THROMBOPLASTIN TIME PARTIAL: CPT | Performed by: EMERGENCY MEDICINE

## 2021-09-05 PROCEDURE — 85610 PROTHROMBIN TIME: CPT | Performed by: EMERGENCY MEDICINE

## 2021-09-05 PROCEDURE — 80048 BASIC METABOLIC PNL TOTAL CA: CPT | Performed by: EMERGENCY MEDICINE

## 2021-09-05 PROCEDURE — 85025 COMPLETE CBC W/AUTO DIFF WBC: CPT | Performed by: EMERGENCY MEDICINE

## 2021-09-05 PROCEDURE — G0378 HOSPITAL OBSERVATION PER HR: HCPCS

## 2021-09-05 PROCEDURE — 93005 ELECTROCARDIOGRAM TRACING: CPT | Performed by: EMERGENCY MEDICINE

## 2021-09-05 PROCEDURE — 99285 EMERGENCY DEPT VISIT HI MDM: CPT

## 2021-09-05 RX ORDER — ROSUVASTATIN CALCIUM 10 MG/1
20 TABLET, COATED ORAL DAILY
Status: DISCONTINUED | OUTPATIENT
Start: 2021-09-05 | End: 2021-09-06 | Stop reason: HOSPADM

## 2021-09-05 RX ORDER — OXYBUTYNIN CHLORIDE 5 MG/1
5 TABLET, EXTENDED RELEASE ORAL DAILY
Status: DISCONTINUED | OUTPATIENT
Start: 2021-09-05 | End: 2021-09-06 | Stop reason: HOSPADM

## 2021-09-05 RX ORDER — CITALOPRAM 20 MG/1
10 TABLET ORAL DAILY
Status: DISCONTINUED | OUTPATIENT
Start: 2021-09-05 | End: 2021-09-06 | Stop reason: HOSPADM

## 2021-09-05 RX ORDER — ASPIRIN 81 MG/1
81 TABLET ORAL DAILY
Status: DISCONTINUED | OUTPATIENT
Start: 2021-09-05 | End: 2021-09-06 | Stop reason: HOSPADM

## 2021-09-05 RX ORDER — LANSOPRAZOLE
15 KIT EVERY MORNING
Status: DISCONTINUED | OUTPATIENT
Start: 2021-09-06 | End: 2021-09-06 | Stop reason: HOSPADM

## 2021-09-05 RX ORDER — ASPIRIN 325 MG
325 TABLET ORAL ONCE
Status: COMPLETED | OUTPATIENT
Start: 2021-09-05 | End: 2021-09-05

## 2021-09-05 RX ORDER — SODIUM CHLORIDE 0.9 % (FLUSH) 0.9 %
10 SYRINGE (ML) INJECTION AS NEEDED
Status: DISCONTINUED | OUTPATIENT
Start: 2021-09-05 | End: 2021-09-06 | Stop reason: HOSPADM

## 2021-09-05 RX ORDER — LISINOPRIL 5 MG/1
2.5 TABLET ORAL DAILY
Status: DISCONTINUED | OUTPATIENT
Start: 2021-09-05 | End: 2021-09-06 | Stop reason: HOSPADM

## 2021-09-05 RX ADMIN — LISINOPRIL 2.5 MG: 5 TABLET ORAL at 17:31

## 2021-09-05 RX ADMIN — ROSUVASTATIN 20 MG: 10 TABLET, FILM COATED ORAL at 17:31

## 2021-09-05 RX ADMIN — OXYBUTYNIN CHLORIDE 5 MG: 5 TABLET, EXTENDED RELEASE ORAL at 17:30

## 2021-09-05 RX ADMIN — METOPROLOL TARTRATE 12.5 MG: 25 TABLET, FILM COATED ORAL at 17:29

## 2021-09-05 RX ADMIN — CITALOPRAM HYDROBROMIDE 10 MG: 20 TABLET ORAL at 17:30

## 2021-09-05 RX ADMIN — ASPIRIN 325 MG ORAL TABLET 325 MG: 325 PILL ORAL at 14:11

## 2021-09-05 NOTE — CONSULTS
Referring Provider: Hospitalist  Reason for Consultation: Chest pain    Patient Care Team:  Brigida Otero APRN as PCP - General (Nurse Practitioner)  Jon Gilbert MD as Consulting Physician (Cardiology)    Chief complaint chest pain    Subjective .     History of present illness:  Goldie Mata is a 80 y.o. female with history of coronary disease congestive heart failure hypertension hyperlipidemia presented to the hospital complains of chest pain.  Patient with chest pain as a left-sided discomfort without any radiation but also with some shortness of breath.  No complains any PND orthopnea.  No palpitation dizziness syncope or swelling of the feet.  Patient has been taking all meds regular.  Patient does not smoke.  In the ER patient was noted to have sinus rhythm with nonspecific ST segment hematocrit her troponin level was normal.  She had a chest x-ray which did not show any abnormalities.  She is admitted.  Review of Systems   Constitutional: Negative for fever and malaise/fatigue.   HENT: Negative for ear pain and nosebleeds.    Eyes: Negative for blurred vision and double vision.   Cardiovascular: Positive for chest pain. Negative for dyspnea on exertion and palpitations.   Respiratory: Positive for shortness of breath. Negative for cough.    Skin: Negative for rash.   Musculoskeletal: Negative for joint pain.   Gastrointestinal: Negative for abdominal pain, nausea and vomiting.   Neurological: Negative for focal weakness and headaches.   Psychiatric/Behavioral: Negative for depression. The patient is not nervous/anxious.    All other systems reviewed and are negative.      History  Past Medical History:   Diagnosis Date   • CHF (congestive heart failure) (CMS/HCC)    • Coronary artery disease    • Hyperlipidemia    • Hypertension    • Myocardial infarction (CMS/[facility])        No past surgical history on file.    Family History   Problem Relation Age of Onset   • Heart disease Mother    • Heart disease  Father    • Heart disease Sister        Social History     Tobacco Use   • Smoking status: Former Smoker   • Smokeless tobacco: Never Used   Vaping Use   • Vaping Use: Never used   Substance Use Topics   • Alcohol use: Not Currently   • Drug use: Never        Medications Prior to Admission   Medication Sig Dispense Refill Last Dose   • aspirin 81 MG tablet Take 81 mg by mouth Daily.      • Cholecalciferol (Vitamin D3) 50 MCG (2000 UT) capsule Take 2,000 Units by mouth Daily. Take 2 capsules daily      • citalopram (CeleXA) 10 MG tablet Take 10 mg by mouth Daily.      • Cyanocobalamin 1000 MCG/ML kit Inject  as directed Every 30 (Thirty) Days.      • lisinopril (PRINIVIL,ZESTRIL) 2.5 MG tablet Take 2.5 mg by mouth Daily.      • metoprolol tartrate (LOPRESSOR) 25 MG tablet Take 12.5 mg by mouth Daily.      • omeprazole (priLOSEC) 40 MG capsule Take 40 mg by mouth Daily.      • oxybutynin XL (DITROPAN-XL) 5 MG 24 hr tablet Take 5 mg by mouth Daily.  5    • rosuvastatin (CRESTOR) 20 MG tablet Take 20 mg by mouth Daily.      • fesoterodine fumarate (TOVIAZ) 4 MG tablet sustained-release 24 hour tablet 4 mg Daily.            Contrast dye, Hydrocodone-acetaminophen, and Penicillin g    Scheduled Meds:aspirin, 81 mg, Oral, Daily  citalopram, 10 mg, Oral, Daily  [START ON 9/6/2021] lansoprazole, 15 mg, Oral, QAM  lisinopril, 2.5 mg, Oral, Daily  metoprolol tartrate, 12.5 mg, Oral, Daily  oxybutynin XL, 5 mg, Oral, Daily  rosuvastatin, 20 mg, Oral, Daily      Continuous Infusions:   PRN Meds:.[COMPLETED] Insert peripheral IV **AND** sodium chloride    Objective     VITAL SIGNS  Vitals:    09/05/21 1601 09/05/21 1616 09/05/21 1619 09/05/21 1657   BP: 109/43 112/47 112/47 135/76   BP Location:    Right arm   Patient Position:   Lying Lying   Pulse: 62 62 62 68   Resp:   16 18   Temp:    97.9 °F (36.6 °C)   TempSrc:    Oral   SpO2: 96% 95% 95% 98%   Weight:       Height:           Flowsheet Rows      First Filed Value  "  Admission Height  152.4 cm (60\") Documented at 09/05/2021 1302   Admission Weight  69.2 kg (152 lb 8.9 oz) Documented at 09/05/2021 1302           TELEMETRY: Sinus rhythm nonspecific ST segment abnormality    Physical Exam:  Constitutional:       Appearance: Well-developed.   Eyes:      General: No scleral icterus.     Conjunctiva/sclera: Conjunctivae normal.      Pupils: Pupils are equal, round, and reactive to light.   HENT:      Head: Normocephalic and atraumatic.   Neck:      Vascular: No carotid bruit or JVD.   Pulmonary:      Effort: Pulmonary effort is normal.      Breath sounds: Normal breath sounds. No wheezing. No rales.   Cardiovascular:      Normal rate. Regular rhythm.   Pulses:     Intact distal pulses.   Abdominal:      General: Bowel sounds are normal.      Palpations: Abdomen is soft.   Musculoskeletal: Normal range of motion.      Cervical back: Normal range of motion and neck supple. Skin:     General: Skin is warm and dry.      Findings: No rash.   Neurological:      Mental Status: Alert.      Comments: No focal deficits          Results Review:   I reviewed the patient's new clinical results.  Lab Results (last 24 hours)     Procedure Component Value Units Date/Time    COVID PRE-OP / PRE-PROCEDURE SCREENING ORDER (NO ISOLATION) - Swab, Nasopharynx [[id number]]  (Normal) Collected: 09/05/21 1539    Specimen: Swab from Nasopharynx Updated: 09/05/21 1612    Narrative:      The following orders were created for panel order COVID PRE-OP / PRE-PROCEDURE SCREENING ORDER (NO ISOLATION) - Swab, Nasopharynx.  Procedure                               Abnormality         Status                     ---------                               -----------         ------                     COVID-19,CEPHEID/VIJI/BD...[[id number]]  Normal              Final result                 Please view results for these tests on the individual orders.    COVID-19,CEPHEID/VIJI/BDMAX,COR/MIYA/PAD/CAREY IN-HOUSE(OR " EMERGENT/ADD-ON),NP SWAB IN TRANSPORT MEDIA 3-4 HR TAT, RT-PCR - Swab, Nasopharynx [[id number]]  (Normal) Collected: 09/05/21 1539    Specimen: Swab from Nasopharynx Updated: 09/05/21 1612     COVID19 Not Detected    Narrative:      Fact sheet for providers: https://www.fda.gov/media/371976/download     Fact sheet for patients: https://www.fda.gov/media/041284/download  Fact sheet for providers: https://www.fda.gov/media/324022/download    Fact sheet for patients: https://www.fda.gov/media/175263/download    Test performed by PCR.    Extra Tubes [[id number]] Collected: 09/05/21 1400    Specimen: Blood, Venous Line Updated: 09/05/21 1432    Narrative:      The following orders were created for panel order Extra Tubes.  Procedure                               Abnormality         Status                     ---------                               -----------         ------                     Gold Top - SST[[id number]]                                   Final result               Green Top (Gel)[[id number]]                                  Final result                 Please view results for these tests on the individual orders.    Gold Top - SST [[id number]] Collected: 09/05/21 1400    Specimen: Blood Updated: 09/05/21 1432    Basic Metabolic Panel [[id number]]  (Abnormal) Collected: 09/05/21 1400    Specimen: Blood Updated: 09/05/21 1431     Glucose 119 mg/dL      BUN 15 mg/dL      Creatinine 1.09 mg/dL      Sodium 138 mmol/L      Potassium 4.0 mmol/L      Chloride 101 mmol/L      CO2 26.0 mmol/L      Calcium 9.0 mg/dL      eGFR Non African Amer 48 mL/min/1.73      BUN/Creatinine Ratio 13.8     Anion Gap 11.0 mmol/L     Narrative:      GFR Normal >60  Chronic Kidney Disease <60  Kidney Failure <15      Troponin [[id number]]  (Normal) Collected: 09/05/21 1400    Specimen: Blood Updated: 09/05/21 1431     Troponin T <0.010 ng/mL     Narrative:      Troponin T Reference Range:  <= 0.03 ng/mL-   Negative for AMI  >0.03 ng/mL-      Abnormal for myocardial necrosis.  Clinicians would have to utilize clinical acumen, EKG, Troponin and serial changes to determine if it is an Acute Myocardial Infarction or myocardial injury due to an underlying chronic condition.       Results may be falsely decreased if patient taking Biotin.      aPTT [[id number]]  (Abnormal) Collected: 09/05/21 1400    Specimen: Blood Updated: 09/05/21 1420     PTT 31.5 seconds     Protime-INR [[id number]]  (Normal) Collected: 09/05/21 1400    Specimen: Blood Updated: 09/05/21 1420     Protime 10.6 Seconds      INR 0.95    Green Top (Gel) [[id number]] Collected: 09/05/21 1400    Specimen: Blood Updated: 09/05/21 1418    CBC & Differential [[id number]]  (Abnormal) Collected: 09/05/21 1400    Specimen: Blood Updated: 09/05/21 1409    Narrative:      The following orders were created for panel order CBC & Differential.  Procedure                               Abnormality         Status                     ---------                               -----------         ------                     CBC Auto Differential[[id number]]        Abnormal            Final result                 Please view results for these tests on the individual orders.    CBC Auto Differential [[id number]]  (Abnormal) Collected: 09/05/21 1400    Specimen: Blood Updated: 09/05/21 1409     WBC 6.70 10*3/mm3      RBC 4.26 10*6/mm3      Hemoglobin 12.8 g/dL      Hematocrit 37.6 %      MCV 88.3 fL      MCH 30.1 pg      MCHC 34.1 g/dL      RDW 14.2 %      RDW-SD 42.9 fl      MPV 7.9 fL      Platelets 183 10*3/mm3      Neutrophil % 85.3 %      Lymphocyte % 7.5 %      Monocyte % 5.3 %      Eosinophil % 0.9 %      Basophil % 1.0 %      Neutrophils, Absolute 5.70 10*3/mm3      Lymphocytes, Absolute 0.50 10*3/mm3      Monocytes, Absolute 0.40 10*3/mm3      Eosinophils, Absolute 0.10 10*3/mm3      Basophils, Absolute 0.10 10*3/mm3      nRBC 0.1 /100 WBC           Imaging Results (Last 24 Hours)     Procedure Component Value  Units Date/Time    XR Chest 1 View [[id number]] Collected: 09/05/21 1413     Updated: 09/05/21 1416    Narrative:      Examination: XR CHEST 1 VW-     Date of Exam: 9/5/2021 2:06 PM     Indication: chest pain.       Comparison: 01/07/2019     Technique: 1 view of the chest      FINDINGS:  There is a port with its tip in the lower SVC. There is a calcified 12  mm right upper lung nodule. There is no vascular congestion, airspace  opacity, or pleural effusion. Negative for pneumothorax. There is a  large lobular opacity in the lower mediastinum which is compatible with  a hiatal hernia; this was demonstrated on chest CT of 7-18. No  significant bone abnormality. There is mild cardiomegaly. Hilar contours  are stable.       Impression:      1. No evidence of active lung disease.  2. Large hiatal hernia.  3. Mild cardiomegaly.     Electronically Signed By-Farrah Calderon MD On:9/5/2021 2:14 PM  This report was finalized on [id number] by  Farrah Calderon MD.          EKG      I personally viewed and interpreted the patient's EKG/Telemetry data:    ECHOCARDIOGRAM:      STRESS MYOVIEW:    CARDIAC CATHETERIZATION:    OTHER:         Assessment/Plan     Chest pain  Hypertension  Hyperlipidemia  Coronary disease  Congestive heart failure    Presented with chest pain and is to be ruled out for MI by EKG and enzymes  Patient will have a stress Myoview to rule out ischemia  Patient blood pressure and heart rate stable  Patient lipid levels are followed by the primary care doctor  Patient has history of coronary status post MI stent placement the past.    I discussed the patients findings and my recommendations with patient and nurse    Jon Gilbert MD  09/05/21  19:36 EDT

## 2021-09-05 NOTE — ED PROVIDER NOTES
Subjective   Chief complaint: Chest pain    80-year-old female with a history of coronary artery disease presents with chest pain.  Patient states the pain started this morning while at rest.  Pain was in the left chest without radiation.  She had some nausea and dry heaving as well.  She denies any shortness of breath, diaphoresis, dizziness, palpitations.  She denies any alleviating or exacerbating factors.  She states the pain is now gone.  Pain was moderate in intensity.      History provided by:  Patient      Review of Systems   Constitutional: Negative for fever.   HENT: Negative for congestion and sore throat.    Eyes: Negative for redness.   Respiratory: Negative for cough and shortness of breath.    Cardiovascular: Positive for chest pain.   Gastrointestinal: Positive for nausea. Negative for abdominal pain, diarrhea and vomiting.   Genitourinary: Negative for dysuria.   Musculoskeletal: Negative for back pain.   Skin: Negative for rash.   Neurological: Negative for dizziness and headaches.   Psychiatric/Behavioral: Negative for confusion.       Past Medical History:   Diagnosis Date   • CHF (congestive heart failure) (CMS/[facility])    • Coronary artery disease    • Hyperlipidemia    • Hypertension    • Myocardial infarction (CMS/[facility])        Allergies   Allergen Reactions   • Contrast Dye Unknown (See Comments)   • Hydrocodone-Acetaminophen Unknown (See Comments)   • Penicillin G Unknown (See Comments)       No past surgical history on file.    Family History   Problem Relation Age of Onset   • Heart disease Mother    • Heart disease Father    • Heart disease Sister        Social History     Socioeconomic History   • Marital status:      Spouse name: Not on file   • Number of children: Not on file   • Years of education: Not on file   • Highest education level: Not on file   Tobacco Use   • Smoking status: Former Smoker   • Smokeless tobacco: Never Used   Vaping Use   • Vaping Use: Never used   Substance  "and Sexual Activity   • Alcohol use: Not Currently   • Drug use: Never   • Sexual activity: Defer       /61 (BP Location: Left arm, Patient Position: Lying)   Pulse 64   Temp 97.5 °F (36.4 °C) (Tympanic)   Resp 17   Ht 152.4 cm (60\")   Wt 69.2 kg (152 lb 8.9 oz)   SpO2 95%   BMI 29.79 kg/m²       Objective   Physical Exam  Vitals and nursing note reviewed.   Constitutional:       Appearance: She is well-developed.   HENT:      Head: Normocephalic and atraumatic.   Eyes:      Pupils: Pupils are equal, round, and reactive to light.   Cardiovascular:      Rate and Rhythm: Normal rate and regular rhythm.      Heart sounds: Normal heart sounds.   Pulmonary:      Effort: Pulmonary effort is normal. No respiratory distress.      Breath sounds: Normal breath sounds.   Abdominal:      General: Bowel sounds are normal.      Palpations: Abdomen is soft.      Tenderness: There is no abdominal tenderness.   Musculoskeletal:         General: Normal range of motion.      Cervical back: Normal range of motion and neck supple.   Skin:     General: Skin is warm and dry.   Neurological:      Mental Status: She is alert and oriented to person, place, and time.         Procedures           ED Course      My interpretation of EKG shows sinus rhythm, rate of 68, PVC, LVH with secondary repolarization abnormality, no ST elevation     Results for orders placed or performed during the hospital encounter of 09/05/21   Basic Metabolic Panel    Specimen: Blood   Result Value Ref Range    Glucose 119 (H) 65 - 99 mg/dL    BUN 15 8 - 23 mg/dL    Creatinine 1.09 (H) 0.57 - 1.00 mg/dL    Sodium 138 136 - 145 mmol/L    Potassium 4.0 3.5 - 5.2 mmol/L    Chloride 101 98 - 107 mmol/L    CO2 26.0 22.0 - 29.0 mmol/L    Calcium 9.0 8.6 - 10.5 mg/dL    eGFR Non African Amer 48 (L) >60 mL/min/1.73    BUN/Creatinine Ratio 13.8 7.0 - 25.0    Anion Gap 11.0 5.0 - 15.0 mmol/L   Protime-INR    Specimen: Blood   Result Value Ref Range    Protime 10.6 " 9.6 - 11.7 Seconds    INR 0.95 0.93 - 1.10   aPTT    Specimen: Blood   Result Value Ref Range    PTT 31.5 (H) 24.0 - 31.0 seconds   Troponin    Specimen: Blood   Result Value Ref Range    Troponin T <0.010 0.000 - 0.030 ng/mL   CBC Auto Differential    Specimen: Blood   Result Value Ref Range    WBC 6.70 3.40 - 10.80 10*3/mm3    RBC 4.26 3.77 - 5.28 10*6/mm3    Hemoglobin 12.8 12.0 - 15.9 g/dL    Hematocrit 37.6 34.0 - 46.6 %    MCV 88.3 79.0 - 97.0 fL    MCH 30.1 26.6 - 33.0 pg    MCHC 34.1 31.5 - 35.7 g/dL    RDW 14.2 12.3 - 15.4 %    RDW-SD 42.9 37.0 - 54.0 fl    MPV 7.9 6.0 - 12.0 fL    Platelets 183 140 - 450 10*3/mm3    Neutrophil % 85.3 (H) 42.7 - 76.0 %    Lymphocyte % 7.5 (L) 19.6 - 45.3 %    Monocyte % 5.3 5.0 - 12.0 %    Eosinophil % 0.9 0.3 - 6.2 %    Basophil % 1.0 0.0 - 1.5 %    Neutrophils, Absolute 5.70 1.70 - 7.00 10*3/mm3    Lymphocytes, Absolute 0.50 (L) 0.70 - 3.10 10*3/mm3    Monocytes, Absolute 0.40 0.10 - 0.90 10*3/mm3    Eosinophils, Absolute 0.10 0.00 - 0.40 10*3/mm3    Basophils, Absolute 0.10 0.00 - 0.20 10*3/mm3    nRBC 0.1 0.0 - 0.2 /100 WBC   ECG 12 Lead   Result Value Ref Range    QT Interval 488 ms     XR Chest 1 View    Result Date: 9/5/2021  1. No evidence of active lung disease. 2. Large hiatal hernia. 3. Mild cardiomegaly.  Electronically Signed By-Farrah Calderon MD On:9/5/2021 2:14 PM This report was finalized on [id number] by  Farrah Calderon MD.                                    MDM   Patient had the above evaluation.  Results were discussed with the patient.  Patient remained well-appearing in the emergency room.  Work-up has been unremarkable so far.  Chest x-ray shows no acute disease.  EKG shows no acute ischemia.  Troponin is negative.  Patient will be admitted for further evaluation of her chest pain.  I discussed with the hospitalist who agreed to admit.      Final diagnoses:   Chest pain, unspecified type       ED Disposition  ED Disposition     ED Disposition  Condition Comment    Decision to Admit  Level of Care: Telemetry [5]   Admitting Physician: GREY BLACKWOOD [[id number]]            No follow-up provider specified.       Medication List      No changes were made to your prescriptions during this visit.          Jose Blake MD  09/05/21 6807

## 2021-09-05 NOTE — H&P
[facility] Medicine Services      Patient Name: Goldie Mata  : 1940  MRN: [id number]  Primary Care Physician:  Brigida Otero APRN  Date of admission: 2021      Subjective      Chief Complaint: Chest pain.    History of Present Illness: Goldie Mata is a 80 y.o. female who presented to [facility] on 2021 complaint of chest pain, underwent EKG and initial cardiac marker came out unremarkable.  Chest pain retrosternal, nonradiating, no association with exertion.  Patient carries medical history of coronary artery disease, hypertension and dyslipidemia.  Following this aspirin patient with further care.      Review of Systems   All other systems reviewed and are negative.       Personal History     Past Medical History:   Diagnosis Date   • CHF (congestive heart failure) (CMS/HCC)    • Coronary artery disease    • Hyperlipidemia    • Hypertension    • Myocardial infarction (CMS/HCC)        No past surgical history on file.    Family History: family history includes Heart disease in her father, mother, and sister. Otherwise pertinent FHx was reviewed and not pertinent to current issue.    Social History:  reports that she has quit smoking. She has never used smokeless tobacco. She reports previous alcohol use. She reports that she does not use drugs.    Home Medications:  Prior to Admission Medications     Prescriptions Last Dose Informant Patient Reported? Taking?    aspirin 81 MG tablet   Yes No    Take 81 mg by mouth Daily.    Cholecalciferol (Vitamin D3) 50 MCG (2000 UT) capsule   Yes No    Take 2,000 Units by mouth Daily.    citalopram (CeleXA) 10 MG tablet   Yes No    Take 10 mg by mouth Daily.    Cyanocobalamin 1000 MCG/ML kit   Yes No    Inject  as directed Every 30 (Thirty) Days.    fesoterodine fumarate (TOVIAZ) 4 MG tablet sustained-release 24 hour tablet   Yes No    4 mg Daily.    lisinopril (PRINIVIL,ZESTRIL) 2.5 MG tablet   Yes No    Take 2.5 mg by  mouth Daily.    metoprolol tartrate (LOPRESSOR) 25 MG tablet   Yes No    Take 25 mg by mouth Daily.    omeprazole (priLOSEC) 40 MG capsule   Yes No    Take 40 mg by mouth Daily.    oxybutynin XL (DITROPAN-XL) 5 MG 24 hr tablet   Yes No    Take 5 mg by mouth Daily. 200001    rosuvastatin (CRESTOR) 20 MG tablet   Yes No    Take 20 mg by mouth Daily.            Allergies:  Allergies   Allergen Reactions   • Contrast Dye Unknown (See Comments)   • Hydrocodone-Acetaminophen Unknown (See Comments)   • Penicillin G Unknown (See Comments)       Objective      Vitals:   Temp:  [97.5 °F (36.4 °C)] 97.5 °F (36.4 °C)  Heart Rate:  [61-67] 64  Resp:  [15-17] 16  BP: (114-141)/(61-67) 121/64    Physical Exam  Vitals and nursing note reviewed.   Constitutional:       General: She is not in acute distress.     Appearance: Normal appearance. She is well-developed. She is not ill-appearing, toxic-appearing or diaphoretic.   HENT:      Head: Normocephalic and atraumatic.      Right Ear: Ear canal and external ear normal.      Left Ear: Ear canal and external ear normal.      Nose: Nose normal. No congestion or rhinorrhea.      Mouth/Throat:      Mouth: Mucous membranes are moist.      Pharynx: No oropharyngeal exudate.   Eyes:      General: No scleral icterus.        Right eye: No discharge.         Left eye: No discharge.      Extraocular Movements: Extraocular movements intact.      Conjunctiva/sclera: Conjunctivae normal.      Pupils: Pupils are equal, round, and reactive to light.   Neck:      Thyroid: No thyromegaly.      Vascular: No carotid bruit or JVD.      Trachea: No tracheal deviation.   Cardiovascular:      Rate and Rhythm: Normal rate and regular rhythm.      Pulses: Normal pulses.      Heart sounds: Normal heart sounds. No murmur heard.   No friction rub. No gallop.    Pulmonary:      Effort: Pulmonary effort is normal. No respiratory distress.      Breath sounds: Normal breath sounds. No stridor. No wheezing, rhonchi  or rales.   Chest:      Chest wall: No tenderness.   Abdominal:      General: Bowel sounds are normal. There is no distension.      Palpations: Abdomen is soft. There is no mass.      Tenderness: There is no abdominal tenderness. There is no guarding or rebound.      Hernia: No hernia is present.   Musculoskeletal:         General: No swelling, tenderness, deformity or signs of injury. Normal range of motion.      Cervical back: Normal range of motion and neck supple. No rigidity. No muscular tenderness.      Right lower leg: No edema.      Left lower leg: No edema.   Lymphadenopathy:      Cervical: No cervical adenopathy.   Skin:     General: Skin is warm and dry.      Coloration: Skin is not jaundiced or pale.      Findings: No bruising, erythema or rash.   Neurological:      General: No focal deficit present.      Mental Status: She is alert and oriented to person, place, and time. Mental status is at baseline.      Cranial Nerves: No cranial nerve deficit.      Sensory: No sensory deficit.      Motor: No weakness or abnormal muscle tone.      Coordination: Coordination normal.   Psychiatric:         Mood and Affect: Mood normal.         Behavior: Behavior normal.         Thought Content: Thought content normal.         Judgment: Judgment normal.          Result Review    Result Review:  I have personally reviewed the results from the time of this admission to 9/5/2021 15:53 EDT and agree with these findings:  [x]  Laboratory  [x]  Microbiology  []  Radiology  []  EKG/Telemetry   []  Cardiology/Vascular   []  Pathology  []  Old records  []  Other:  Most notable findings include:     Assessment/Plan        Active Hospital Problems:  There are no active hospital problems to display for this patient.    Plan:     Chest pain rule out acute coronary syndrome, EKG, serial cardia markers, echo and a stress test.    Coronary artery disease, patient noted aspirin and metoprolol.    Dyslipidemia, continue statin, monitor  LFTs as needed.    Hypertension, continue lisinopril and metoprolol.  Monitor vitals    DVT prophylaxis with SCDs.    DVT prophylaxis:  No DVT prophylaxis order currently exists.    CODE STATUS:       Admission Status:  I believe this patient meets observation status.    I discussed the patient's findings and my recommendations with patient.    This patient has been examined wearing appropriate Personal Protective Equipment and discussed with hospital infection control department. 09/05/21      Signature:

## 2021-09-06 ENCOUNTER — APPOINTMENT (OUTPATIENT)
Dept: NUCLEAR MEDICINE | Facility: HOSPITAL | Age: 81
End: 2021-09-06

## 2021-09-06 ENCOUNTER — APPOINTMENT (OUTPATIENT)
Dept: CARDIOLOGY | Facility: HOSPITAL | Age: 81
End: 2021-09-06

## 2021-09-06 VITALS
RESPIRATION RATE: 16 BRPM | DIASTOLIC BLOOD PRESSURE: 65 MMHG | SYSTOLIC BLOOD PRESSURE: 132 MMHG | OXYGEN SATURATION: 98 % | BODY MASS INDEX: 29.95 KG/M2 | TEMPERATURE: 98.1 F | HEART RATE: 60 BPM | HEIGHT: 60 IN | WEIGHT: 152.56 LBS

## 2021-09-06 PROBLEM — R07.9 CHEST PAIN: Status: ACTIVE | Noted: 2021-09-06

## 2021-09-06 LAB
BH CV NUCLEAR PRIOR STUDY: 3
BH CV REST NUCLEAR ISOTOPE DOSE: 7.6 MCI
BH CV STRESS BP STAGE 1: NORMAL
BH CV STRESS BP STAGE 2: NORMAL
BH CV STRESS COMMENTS STAGE 1: NORMAL
BH CV STRESS COMMENTS STAGE 2: NORMAL
BH CV STRESS DOSE REGADENOSON STAGE 1: 0.4
BH CV STRESS DURATION MIN STAGE 1: 0
BH CV STRESS DURATION MIN STAGE 2: 4
BH CV STRESS DURATION SEC STAGE 1: 10
BH CV STRESS DURATION SEC STAGE 2: 0
BH CV STRESS HR STAGE 1: 58
BH CV STRESS HR STAGE 2: 92
BH CV STRESS NUCLEAR ISOTOPE DOSE: 18.6 MCI
BH CV STRESS PROTOCOL 1: NORMAL
BH CV STRESS RECOVERY BP: NORMAL MMHG
BH CV STRESS RECOVERY HR: 75 BPM
BH CV STRESS STAGE 1: 1
BH CV STRESS STAGE 2: 2
LV EF NUC BP: 25 %
MAXIMAL PREDICTED HEART RATE: 138 BPM
PERCENT MAX PREDICTED HR: 66.67 %
QT INTERVAL: 488 MS
STRESS BASELINE BP: NORMAL MMHG
STRESS BASELINE HR: 62 BPM
STRESS PERCENT HR: 78 %
STRESS POST PEAK BP: NORMAL MMHG
STRESS POST PEAK HR: 92 BPM
STRESS TARGET HR: 117 BPM
TROPONIN T SERPL-MCNC: <0.01 NG/ML (ref 0–0.03)

## 2021-09-06 PROCEDURE — 78452 HT MUSCLE IMAGE SPECT MULT: CPT

## 2021-09-06 PROCEDURE — 93017 CV STRESS TEST TRACING ONLY: CPT

## 2021-09-06 PROCEDURE — 96374 THER/PROPH/DIAG INJ IV PUSH: CPT

## 2021-09-06 PROCEDURE — 25010000002 REGADENOSON 0.4 MG/5ML SOLUTION: Performed by: HOSPITALIST

## 2021-09-06 PROCEDURE — 99217 PR OBSERVATION CARE DISCHARGE MANAGEMENT: CPT | Performed by: HOSPITALIST

## 2021-09-06 PROCEDURE — 0 TECHNETIUM TETROFOSMIN KIT: Performed by: HOSPITALIST

## 2021-09-06 PROCEDURE — G0378 HOSPITAL OBSERVATION PER HR: HCPCS

## 2021-09-06 PROCEDURE — 25010000002 HEPARIN LOCK FLUSH PER 10 UNITS: Performed by: HOSPITALIST

## 2021-09-06 PROCEDURE — 99213 OFFICE O/P EST LOW 20 MIN: CPT | Performed by: INTERNAL MEDICINE

## 2021-09-06 PROCEDURE — 78452 HT MUSCLE IMAGE SPECT MULT: CPT | Performed by: INTERNAL MEDICINE

## 2021-09-06 PROCEDURE — 93018 CV STRESS TEST I&R ONLY: CPT | Performed by: INTERNAL MEDICINE

## 2021-09-06 PROCEDURE — 93016 CV STRESS TEST SUPVJ ONLY: CPT | Performed by: NURSE PRACTITIONER

## 2021-09-06 PROCEDURE — A9502 TC99M TETROFOSMIN: HCPCS | Performed by: HOSPITALIST

## 2021-09-06 PROCEDURE — 84484 ASSAY OF TROPONIN QUANT: CPT | Performed by: INTERNAL MEDICINE

## 2021-09-06 RX ORDER — HEPARIN SODIUM (PORCINE) LOCK FLUSH IV SOLN 100 UNIT/ML 100 UNIT/ML
500 SOLUTION INTRAVENOUS ONCE
Status: COMPLETED | OUTPATIENT
Start: 2021-09-06 | End: 2021-09-06

## 2021-09-06 RX ADMIN — LISINOPRIL 2.5 MG: 5 TABLET ORAL at 12:28

## 2021-09-06 RX ADMIN — TETROFOSMIN 1 DOSE: 1.38 INJECTION, POWDER, LYOPHILIZED, FOR SOLUTION INTRAVENOUS at 10:42

## 2021-09-06 RX ADMIN — REGADENOSON 0.4 MG: 0.08 INJECTION, SOLUTION INTRAVENOUS at 11:39

## 2021-09-06 RX ADMIN — ASPIRIN 81 MG: 81 TABLET, COATED ORAL at 12:27

## 2021-09-06 RX ADMIN — METOPROLOL TARTRATE 12.5 MG: 25 TABLET, FILM COATED ORAL at 12:28

## 2021-09-06 RX ADMIN — ROSUVASTATIN 20 MG: 10 TABLET, FILM COATED ORAL at 12:28

## 2021-09-06 RX ADMIN — HEPARIN 500 UNITS: 100 SYRINGE at 15:34

## 2021-09-06 RX ADMIN — LANSOPRAZOLE 15 MG: KIT at 04:56

## 2021-09-06 RX ADMIN — OXYBUTYNIN CHLORIDE 5 MG: 5 TABLET, EXTENDED RELEASE ORAL at 12:28

## 2021-09-06 RX ADMIN — TETROFOSMIN 1 DOSE: 1.38 INJECTION, POWDER, LYOPHILIZED, FOR SOLUTION INTRAVENOUS at 11:39

## 2021-09-06 RX ADMIN — CITALOPRAM HYDROBROMIDE 10 MG: 20 TABLET ORAL at 12:28

## 2021-09-06 NOTE — DISCHARGE SUMMARY
[facility] Medicine Services  DISCHARGE SUMMARY    Patient Name: Goldie Mata  : 1940  MRN: [id number]    Date of Admission: 2021  Date of Discharge:  2021  Primary Care Physician: rBigida Otero APRN      Presenting Problem:   Chest pain, unspecified type [R07.9]    Active and Resolved Hospital Problems:  Active Hospital Problems    Diagnosis POA   • Chest pain [R07.9] Yes      Resolved Hospital Problems   No resolved problems to display.         Hospital Course     Hospital Course by problem list.    Chest pain atypical resolved, stress test negative for reversible ischemia as per Dr. Gilbert, spoke to him over the phone, advised ok to discharge with follow up in a month time.     Coronary artery disease, patient noted aspirin and metoprolol.     Dyslipidemia, continue statin, monitor LFTs as needed.     Hypertension, continue lisinopril and metoprolol.        DISCHARGE Follow Up with PCP in a week time.  Follow up with cardiology service in four week time.      Reasons For Change In Medications and Indications for New Medications:      Day of Discharge     Vital Signs:  Temp:  [97.9 °F (36.6 °C)-98.1 °F (36.7 °C)] 98.1 °F (36.7 °C)  Heart Rate:  [57-68] 60  Resp:  [16-18] 16  BP: (109-157)/(43-81) 132/65    Physical Exam:  Physical Exam  Vitals and nursing note reviewed.   Constitutional:       General: She is not in acute distress.     Appearance: Normal appearance. She is well-developed. She is not ill-appearing, toxic-appearing or diaphoretic.   HENT:      Head: Normocephalic and atraumatic.      Right Ear: Ear canal and external ear normal.      Left Ear: Ear canal and external ear normal.      Nose: Nose normal. No congestion or rhinorrhea.      Mouth/Throat:      Mouth: Mucous membranes are moist.      Pharynx: No oropharyngeal exudate.   Eyes:      General: No scleral icterus.        Right eye: No discharge.         Left eye: No discharge.       Extraocular Movements: Extraocular movements intact.      Conjunctiva/sclera: Conjunctivae normal.      Pupils: Pupils are equal, round, and reactive to light.   Neck:      Thyroid: No thyromegaly.      Vascular: No carotid bruit or JVD.      Trachea: No tracheal deviation.   Cardiovascular:      Rate and Rhythm: Normal rate and regular rhythm.      Pulses: Normal pulses.      Heart sounds: Normal heart sounds. No murmur heard.   No friction rub. No gallop.    Pulmonary:      Effort: Pulmonary effort is normal. No respiratory distress.      Breath sounds: Normal breath sounds. No stridor. No wheezing, rhonchi or rales.   Chest:      Chest wall: No tenderness.   Abdominal:      General: Bowel sounds are normal. There is no distension.      Palpations: Abdomen is soft. There is no mass.      Tenderness: There is no abdominal tenderness. There is no guarding or rebound.      Hernia: No hernia is present.   Musculoskeletal:         General: No swelling, tenderness, deformity or signs of injury. Normal range of motion.      Cervical back: Normal range of motion and neck supple. No rigidity. No muscular tenderness.      Right lower leg: No edema.      Left lower leg: No edema.   Lymphadenopathy:      Cervical: No cervical adenopathy.   Skin:     General: Skin is warm and dry.      Coloration: Skin is not jaundiced or pale.      Findings: No bruising, erythema or rash.   Neurological:      General: No focal deficit present.      Mental Status: She is alert and oriented to person, place, and time. Mental status is at baseline.      Cranial Nerves: No cranial nerve deficit.      Sensory: No sensory deficit.      Motor: No weakness or abnormal muscle tone.      Coordination: Coordination normal.   Psychiatric:         Mood and Affect: Mood normal.         Behavior: Behavior normal.         Thought Content: Thought content normal.         Judgment: Judgment normal.              Pertinent  and/or Most Recent Results     LAB  RESULTS:      Lab 09/05/21  1400   WBC 6.70   HEMOGLOBIN 12.8   HEMATOCRIT 37.6   PLATELETS 183   NEUTROS ABS 5.70   LYMPHS ABS 0.50*   MONOS ABS 0.40   EOS ABS 0.10   MCV 88.3   PROTIME 10.6   APTT 31.5*         Lab 09/05/21  1400   SODIUM 138   POTASSIUM 4.0   CHLORIDE 101   CO2 26.0   ANION GAP 11.0   BUN 15   CREATININE 1.09*   GLUCOSE 119*   CALCIUM 9.0             Lab 09/06/21  0733 09/05/21  1400   TROPONIN T <0.010 <0.010   PROTIME  --  10.6   INR  --  0.95                 Brief Urine Lab Results     None        Microbiology Results (last 10 days)     Procedure Component Value - Date/Time    COVID PRE-OP / PRE-PROCEDURE SCREENING ORDER (NO ISOLATION) - Swab, Nasopharynx [[id number]]  (Normal) Collected: 09/05/21 1539    Lab Status: Final result Specimen: Swab from Nasopharynx Updated: 09/05/21 1612    Narrative:      The following orders were created for panel order COVID PRE-OP / PRE-PROCEDURE SCREENING ORDER (NO ISOLATION) - Swab, Nasopharynx.  Procedure                               Abnormality         Status                     ---------                               -----------         ------                     COVID-19,CEPHEID/VIJI/BD...[[id number]]  Normal              Final result                 Please view results for these tests on the individual orders.    COVID-19,CEPHEID/VIJI/BDMAX,COR/[facility]/PAD/CAREY IN-HOUSE(OR EMERGENT/ADD-ON),NP SWAB IN TRANSPORT MEDIA 3-4 HR TAT, RT-PCR - Swab, Nasopharynx [[id number]]  (Normal) Collected: 09/05/21 1539    Lab Status: Final result Specimen: Swab from Nasopharynx Updated: 09/05/21 1612     COVID19 Not Detected    Narrative:      Fact sheet for providers: https://www.fda.gov/media/953393/download     Fact sheet for patients: https://www.fda.gov/media/470813/download  Fact sheet for providers: https://www.fda.gov/media/944211/download    Fact sheet for patients: https://www.fda.gov/media/220720/download    Test performed by PCR.          XR Chest 1 View    Result  Date: 9/5/2021  Impression: 1. No evidence of active lung disease. 2. Large hiatal hernia. 3. Mild cardiomegaly.  Electronically Signed By-Farrah Calderon MD On:9/5/2021 2:14 PM This report was finalized on [id number] by  Farrah Calderon MD.              Results for orders placed during the hospital encounter of 09/01/21    Adult Transthoracic Echo Complete W/ Cont if Necessary Per Protocol    Interpretation Summary  · The left ventricular cavity is mildly dilated.  · LV ejection fraction is about 40%  · No pericardial effusion noted      Labs Pending at Discharge:      Procedures Performed           Consults:   Consults     Date and Time Order Name Status Description    9/5/2021  4:30 PM Inpatient Cardiology Consult Completed     9/5/2021  2:37 PM Hospitalist (on-call MD unless specified) Completed             Discharge Details        Discharge Medications      Continue These Medications      Instructions Start Date   aspirin 81 MG tablet   81 mg, Oral, Daily      citalopram 10 MG tablet  Commonly known as: CeleXA   10 mg, Oral, Daily      Crestor 20 MG tablet  Generic drug: rosuvastatin   20 mg, Oral, Daily      Cyanocobalamin 1000 MCG/ML kit   Injection, Every 30 Days      lisinopril 2.5 MG tablet  Commonly known as: PRINIVIL,ZESTRIL   2.5 mg, Oral, Daily      metoprolol tartrate 25 MG tablet  Commonly known as: LOPRESSOR   12.5 mg, Oral, Daily      omeprazole 40 MG capsule  Commonly known as: priLOSEC   40 mg, Oral, Daily      oxybutynin XL 5 MG 24 hr tablet  Commonly known as: DITROPAN-XL   5 mg, Oral, Daily      Toviaz 4 MG tablet sustained-release 24 hour tablet  Generic drug: fesoterodine fumarate   4 mg, Every 24 Hours Scheduled      Vitamin D3 50 MCG (2000 UT) capsule   2,000 Units, Oral, Daily, Take 2 capsules daily             Allergies   Allergen Reactions   • Contrast Dye Unknown (See Comments)   • Hydrocodone-Acetaminophen Unknown (See Comments)   • Penicillin G Unknown (See Comments)          Discharge Disposition:   Home or Self Care    Diet:  Hospital:  Diet Order   Procedures   • Diet Cardiac; Healthy Heart         Discharge Activity:         CODE STATUS:  There are no questions and answers to display.         Future Appointments   Date Time Provider Department Center   1/3/2022  9:15 AM Seipel, Joseph F, MD [facility]   4/4/2022  1:30 PM Jon Gilbert MD [facility]           Time spent on Discharge including face to face service:  25 minutes    This patient has been examined wearing appropriate Personal Protective Equipment and discussed with hospital infection control department. 09/06/21      Signature:

## 2021-09-06 NOTE — PLAN OF CARE
Goal Outcome Evaluation:                   Patient stress test is normal, follow up with cardiology in 1 month.

## 2021-09-06 NOTE — PROGRESS NOTES
"Referring Provider: Hospitalist    Reason for follow-up: Chest pain     Patient Care Team:  Brigida Otero APRN as PCP - General (Nurse Practitioner)  Jon Gilbert MD as Consulting Physician (Cardiology)    Subjective .  No chest pain or shortness of breath today    Objective  Lying in bed comfortably     Review of Systems   Constitutional: Negative for fever and malaise/fatigue.   Cardiovascular: Negative for chest pain, dyspnea on exertion and palpitations.   Respiratory: Negative for cough and shortness of breath.    Skin: Negative for rash.   Gastrointestinal: Negative for abdominal pain, nausea and vomiting.   Neurological: Negative for focal weakness and headaches.   All other systems reviewed and are negative.      Contrast dye, Hydrocodone-acetaminophen, and Penicillin g    Scheduled Meds:aspirin, 81 mg, Oral, Daily  citalopram, 10 mg, Oral, Daily  lansoprazole, 15 mg, Oral, QAM  lisinopril, 2.5 mg, Oral, Daily  metoprolol tartrate, 12.5 mg, Oral, Daily  oxybutynin XL, 5 mg, Oral, Daily  rosuvastatin, 20 mg, Oral, Daily      Continuous Infusions:   PRN Meds:.[COMPLETED] Insert peripheral IV **AND** sodium chloride        VITAL SIGNS  Vitals:    09/05/21 1657 09/05/21 1947 09/06/21 0419 09/06/21 1226   BP: 135/76 157/81  132/65   BP Location: Right arm Right arm  Left arm   Patient Position: Lying Lying  Lying   Pulse: 68 63 57 60   Resp: 18 18 18 16   Temp: 97.9 °F (36.6 °C) 97.9 °F (36.6 °C) 98 °F (36.7 °C) 98.1 °F (36.7 °C)   TempSrc: Oral Oral Oral Oral   SpO2: 98% 94% 96% 98%   Weight:       Height:           Flowsheet Rows      First Filed Value   Admission Height  152.4 cm (60\") Documented at 09/05/2021 1302   Admission Weight  69.2 kg (152 lb 8.9 oz) Documented at 09/05/2021 1302           TELEMETRY: Sinus rhythm    Physical Exam:  Constitutional:       Appearance: Well-developed.   Eyes:      General: No scleral icterus.     Conjunctiva/sclera: Conjunctivae normal.   HENT:      Head: Normocephalic " and atraumatic.   Neck:      Vascular: No carotid bruit or JVD.   Pulmonary:      Effort: Pulmonary effort is normal.      Breath sounds: Normal breath sounds. No wheezing. No rales.   Cardiovascular:      Normal rate. Regular rhythm.   Pulses:     Intact distal pulses.   Abdominal:      General: Bowel sounds are normal.      Palpations: Abdomen is soft.   Musculoskeletal:      Cervical back: Normal range of motion and neck supple. Skin:     General: Skin is warm and dry.      Findings: No rash.   Neurological:      Mental Status: Alert.          Results Review:   I reviewed the patient's new clinical results.  Lab Results (last 24 hours)     Procedure Component Value Units Date/Time    Troponin [[id number]]  (Normal) Collected: 09/06/21 0733    Specimen: Blood Updated: 09/06/21 0840     Troponin T <0.010 ng/mL     Narrative:      Troponin T Reference Range:  <= 0.03 ng/mL-   Negative for AMI  >0.03 ng/mL-     Abnormal for myocardial necrosis.  Clinicians would have to utilize clinical acumen, EKG, Troponin and serial changes to determine if it is an Acute Myocardial Infarction or myocardial injury due to an underlying chronic condition.       Results may be falsely decreased if patient taking Biotin.            Imaging Results (Last 24 Hours)     ** No results found for the last 24 hours. **          EKG      I personally viewed and interpreted the patient's EKG/Telemetry data:    ECHOCARDIOGRAM:    STRESS MYOVIEW:    CARDIAC CATHETERIZATION:    OTHER:         Assessment/Plan     Active Problems:    Chest pain  Hypertension  Hyperlipidemia  Coronary disease  Congestive heart failure    Patient presented with chest pain is ruled out for MI by get enzymes  Patient is having a stress Myoview today to rule out ischemia  Blood pressure and heart rate are stable  Patient lipid levels are followed by primary care doctor  Patient has history of coronary artery disease that is post and placement of LAD in the past.    I  discussed the patients findings and my recommendations with patient and nurse    Jon Gilbert MD  09/06/21  13:57 EDT

## 2021-09-07 ENCOUNTER — READMISSION MANAGEMENT (OUTPATIENT)
Dept: CALL CENTER | Facility: HOSPITAL | Age: 81
End: 2021-09-07

## 2021-09-07 NOTE — OUTREACH NOTE
Prep Survey      Responses   [facility] facility patient discharged from?  [facility]   Is LACE score < 7 ?  No   Emergency Room discharge w/ pulse ox?  No   Eligibility  Readm Mgmt   Discharge diagnosis  Chest pain   Does the patient have one of the following disease processes/diagnoses(primary or secondary)?  Other   Does the patient have Home health ordered?  No   Is there a DME ordered?  No   Comments regarding appointments  Appts needed   Prep survey completed?  Yes          Yue Marion RN

## 2021-09-07 NOTE — CASE MANAGEMENT/SOCIAL WORK
Case Management Discharge Note      Final Note: home         Selected Continued Care - Discharged on 9/6/2021 Admission date: 9/5/2021 - Discharge disposition: Home or Self Care        [facility]    No services have been selected for the patient.              [facility]    No services have been selected for the patient.                       Final Discharge Disposition Code: 01 - home or self-care

## 2021-09-08 ENCOUNTER — READMISSION MANAGEMENT (OUTPATIENT)
Dept: CALL CENTER | Facility: HOSPITAL | Age: 81
End: 2021-09-08

## 2021-09-08 NOTE — OUTREACH NOTE
Medical Week 1 Survey      Responses   [facility] patient discharged from?  [facility]   Does the patient have one of the following disease processes/diagnoses(primary or secondary)?  Other   Week 1 attempt successful?  Yes   Call start time  1722   Call end time  1725   Is patient permission given to speak with other caregiver?  Yes   List who call center can speak with  grand gladis Torres reviewed with patient/caregiver?  Yes   Is the patient having any side effects they believe may be caused by any medication additions or changes?  No   Does the patient have all medications ordered at discharge?  Yes   Is the patient taking all medications as directed (includes completed medication regime)?  Yes   Does the patient have a primary care provider?   Yes   Does the patient have an appointment with their PCP within 7 days of discharge?  Greater than 7 days   Comments regarding PCP  Brigida Otero 09/27   What is preventing the patient from scheduling follow up appointments within 7 days of discharge?  Earlier appointment not available   Nursing Interventions  Verified appointment date/time/provider   Has home health visited the patient within 72 hours of discharge?  N/A   Psychosocial issues?  No   Comments  she lives by herself prefers us to speak to her granddaughter, she fogets and is [place]   Did the patient receive a copy of their discharge instructions?  Yes   Nursing interventions  Reviewed instructions with patient, Educated on MyChart   What is the patient's perception of their health status since discharge?  Improving   Is the patient/caregiver able to teach back signs and symptoms related to disease process for when to call PCP?  Yes   Is the patient/caregiver able to teach back signs and symptoms related to disease process for when to call 911?  Yes   Is the patient/caregiver able to teach back the hierarchy of who to call/visit for symptoms/problems? PCP, Specialist, Home health nurse, Urgent Care,  ED, 911  Yes   If the patient is a current smoker, are they able to teach back resources for cessation?  Not a smoker   Week 1 call completed?  Yes   Wrap up additional comments  Grand daughter, states she is back to normal, no new issues, no pain and no questions          Lenore Mancini, RN

## 2021-09-16 ENCOUNTER — READMISSION MANAGEMENT (OUTPATIENT)
Dept: CALL CENTER | Facility: HOSPITAL | Age: 81
End: 2021-09-16

## 2021-09-16 NOTE — OUTREACH NOTE
Medical Week 2 Survey      Responses   [facility] patient discharged from?  [facility]   Does the patient have one of the following disease processes/diagnoses(primary or secondary)?  Other   Week 2 attempt successful?  Yes   Call start time  1545   Discharge diagnosis  Chest pain   Call end time  1546   Is patient permission given to speak with other caregiver?  Yes   List who call center can speak with  grandchild- Melissa   Person spoke with today (if not patient) and relationship  grandchild- Melissa   Is the patient taking all medications as directed (includes completed medication regime)?  Yes   Does the patient have a primary care provider?   Yes   Comments regarding PCP  f/u with Dr. Otero on 9/27   Has the patient kept scheduled appointments due by today?  N/A   Has home health visited the patient within 72 hours of discharge?  N/A   Psychosocial issues?  No   What is the patient's perception of their health status since discharge?  Improving   Is the patient/caregiver able to teach back the hierarchy of who to call/visit for symptoms/problems? PCP, Specialist, Home health nurse, Urgent Care, ED, 911  Yes   Week 2 Call Completed?  Yes   Graduated  Yes   Is the patient interested in additional calls from an ambulatory ?  NOTE:  applies to high risk patients requiring additional follow-up.  No   Did the patient feel the follow up calls were helpful during their recovery period?  Yes   Was the number of calls appropriate?  Yes   Graduated/Revoked comments  Per grandchild, patient is doing well, no further calls needed.          Lori Yañez RN

## 2021-10-11 ENCOUNTER — OFFICE VISIT (OUTPATIENT)
Dept: CARDIOLOGY | Facility: CLINIC | Age: 81
End: 2021-10-11

## 2021-10-11 VITALS
WEIGHT: 152 LBS | BODY MASS INDEX: 29.84 KG/M2 | DIASTOLIC BLOOD PRESSURE: 65 MMHG | HEART RATE: 68 BPM | OXYGEN SATURATION: 96 % | HEIGHT: 60 IN | SYSTOLIC BLOOD PRESSURE: 105 MMHG

## 2021-10-11 DIAGNOSIS — I10 ESSENTIAL HYPERTENSION: ICD-10-CM

## 2021-10-11 DIAGNOSIS — E11.9 TYPE 2 DIABETES MELLITUS WITHOUT COMPLICATION, WITHOUT LONG-TERM CURRENT USE OF INSULIN (HCC): ICD-10-CM

## 2021-10-11 DIAGNOSIS — I25.118 CORONARY ARTERY DISEASE OF NATIVE ARTERY OF NATIVE HEART WITH STABLE ANGINA PECTORIS (HCC): ICD-10-CM

## 2021-10-11 DIAGNOSIS — I50.22 CHRONIC SYSTOLIC CONGESTIVE HEART FAILURE (HCC): Primary | ICD-10-CM

## 2021-10-11 DIAGNOSIS — E78.00 PURE HYPERCHOLESTEROLEMIA: ICD-10-CM

## 2021-10-11 PROCEDURE — 99214 OFFICE O/P EST MOD 30 MIN: CPT | Performed by: INTERNAL MEDICINE

## 2021-10-11 RX ORDER — METOPROLOL TARTRATE 50 MG/1
TABLET, FILM COATED ORAL
Qty: 45 TABLET | Refills: 3 | Status: SHIPPED | OUTPATIENT
Start: 2021-10-11 | End: 2022-12-05

## 2021-10-11 NOTE — PROGRESS NOTES
"    Subjective:     Encounter Date:10/11/2021      Patient ID: Goldie Mata is a 80 y.o. female.    Chief Complaint:  History of Present Illness 80-year-old white female with history of coronary disease congestive heart failure with LV systolic dysfunction hypertension hyperlipidemia diabetes presents to my office for follow-up.  Patient is currently stable without any symptoms of chest pain or shortness of breath at rest on exertion.  No complains any PND orthopnea.  No palpitation dizziness syncope or swelling of the feet.  Patient has been taking all the medicines regularly.  Patient does not smoke.  Patient is trying to exercise regularly patient follows a good diet    The following portions of the patient's history were reviewed and updated as appropriate: allergies, current medications, past family history, past medical history, past social history, past surgical history and problem list.  Past Medical History:   Diagnosis Date   • CHF (congestive heart failure) (HCC)    • Coronary artery disease    • Hyperlipidemia    • Hypertension    • Myocardial infarction (HCC)      History reviewed. No pertinent surgical history.  /65 (BP Location: Left arm, Patient Position: Sitting)   Pulse 68   Ht 152.4 cm (60\")   Wt 68.9 kg (152 lb)   SpO2 96%   BMI 29.69 kg/m²   Family History   Problem Relation Age of Onset   • Heart disease Mother    • Heart disease Father    • Heart disease Sister        Current Outpatient Medications:   •  aspirin 81 MG tablet, Take 81 mg by mouth Daily., Disp: , Rfl:   •  Cholecalciferol (Vitamin D3) 50 MCG (2000 UT) capsule, Take 2,000 Units by mouth Daily. Take 2 capsules daily, Disp: , Rfl:   •  citalopram (CeleXA) 10 MG tablet, Take 10 mg by mouth Daily., Disp: , Rfl:   •  Cyanocobalamin 1000 MCG/ML kit, Inject  as directed Every 30 (Thirty) Days., Disp: , Rfl:   •  fesoterodine fumarate (TOVIAZ) 4 MG tablet sustained-release 24 hour tablet, 4 mg Daily., Disp: , Rfl:   •  " lisinopril (PRINIVIL,ZESTRIL) 2.5 MG tablet, Take 2.5 mg by mouth Daily., Disp: , Rfl:   •  metoprolol tartrate (LOPRESSOR) 25 MG tablet, Take 12.5 mg by mouth Daily., Disp: , Rfl:   •  omeprazole (priLOSEC) 40 MG capsule, Take 40 mg by mouth Daily., Disp: , Rfl:   •  oxybutynin XL (DITROPAN-XL) 5 MG 24 hr tablet, Take 5 mg by mouth Daily., Disp: , Rfl: 5  •  rosuvastatin (CRESTOR) 20 MG tablet, Take 20 mg by mouth Daily., Disp: , Rfl:   •  metoprolol tartrate (LOPRESSOR) 50 MG tablet, Takes 1/2 tablet daily, Disp: 45 tablet, Rfl: 3  Allergies   Allergen Reactions   • Contrast Dye Unknown (See Comments)   • Hydrocodone-Acetaminophen Unknown (See Comments)   • Penicillin G Unknown (See Comments)     Social History     Socioeconomic History   • Marital status:    Tobacco Use   • Smoking status: Former Smoker   • Smokeless tobacco: Never Used   Vaping Use   • Vaping Use: Never used   Substance and Sexual Activity   • Alcohol use: Not Currently   • Drug use: Never   • Sexual activity: Defer     Review of Systems   Constitutional: Negative for fever and malaise/fatigue.   Cardiovascular: Negative for chest pain, dyspnea on exertion and palpitations.   Respiratory: Negative for cough and shortness of breath.    Skin: Negative for rash.   Gastrointestinal: Negative for abdominal pain, nausea and vomiting.   Neurological: Negative for focal weakness and headaches.   All other systems reviewed and are negative.             Objective:     Constitutional:       Appearance: Well-developed.   Eyes:      General: No scleral icterus.     Conjunctiva/sclera: Conjunctivae normal.   HENT:      Head: Normocephalic and atraumatic.   Neck:      Vascular: No carotid bruit or JVD.   Pulmonary:      Effort: Pulmonary effort is normal.      Breath sounds: Normal breath sounds. No wheezing. No rales.   Cardiovascular:      Normal rate. Regular rhythm.   Pulses:     Intact distal pulses.   Abdominal:      General: Bowel sounds are  normal.      Palpations: Abdomen is soft.   Musculoskeletal:      Cervical back: Normal range of motion and neck supple. Skin:     General: Skin is warm and dry.      Findings: No rash.   Neurological:      Mental Status: Alert.       Procedures    Lab Review:         MDM  1.  Coronary disease  Patient has nonobstructive disease and is currently stable on medication  2.  Congestive heart failure  Patient has mild LV systolic dysfunction EF of 40% and is currently on beta-blockers and ACE inhibitor  3.  Hypertension  Patient blood pressure currently stable on metoprolol and lisinopril  4 hyperlipidemia  Patient is on Crestor 20 mg once a day and the lipid levels are well within normal limits  History of diabetes  Patient is currently on diet therapy      Patient's previous medical records, labs, and EKG were reviewed and discussed with the patient at today's visit.

## 2022-01-03 ENCOUNTER — OFFICE VISIT (OUTPATIENT)
Dept: NEUROLOGY | Facility: CLINIC | Age: 82
End: 2022-01-03

## 2022-01-03 VITALS
WEIGHT: 151 LBS | HEART RATE: 67 BPM | HEIGHT: 60 IN | SYSTOLIC BLOOD PRESSURE: 99 MMHG | TEMPERATURE: 97.5 F | DIASTOLIC BLOOD PRESSURE: 66 MMHG | BODY MASS INDEX: 29.64 KG/M2

## 2022-01-03 DIAGNOSIS — R41.3 MEMORY LOSS: Primary | ICD-10-CM

## 2022-01-03 PROCEDURE — 99204 OFFICE O/P NEW MOD 45 MIN: CPT | Performed by: PSYCHIATRY & NEUROLOGY

## 2022-01-03 RX ORDER — DONEPEZIL HYDROCHLORIDE 5 MG/1
5 TABLET, FILM COATED ORAL NIGHTLY
Qty: 90 TABLET | Refills: 0 | Status: SHIPPED | OUTPATIENT
Start: 2022-01-03 | End: 2022-03-29

## 2022-01-24 ENCOUNTER — HOSPITAL ENCOUNTER (OUTPATIENT)
Dept: MRI IMAGING | Facility: HOSPITAL | Age: 82
Discharge: HOME | End: 2022-01-24
Admitting: PSYCHIATRY & NEUROLOGY

## 2022-01-24 DIAGNOSIS — R41.3 MEMORY LOSS: ICD-10-CM

## 2022-01-24 PROCEDURE — 70551 MRI BRAIN STEM W/O DYE: CPT

## 2022-03-29 RX ORDER — DONEPEZIL HYDROCHLORIDE 5 MG/1
TABLET, FILM COATED ORAL
Qty: 90 TABLET | Refills: 0 | Status: SHIPPED | OUTPATIENT
Start: 2022-03-29 | End: 2022-06-27

## 2022-04-04 ENCOUNTER — OFFICE VISIT (OUTPATIENT)
Dept: CARDIOLOGY | Facility: CLINIC | Age: 82
End: 2022-04-04

## 2022-04-04 VITALS
BODY MASS INDEX: 29.16 KG/M2 | HEART RATE: 58 BPM | WEIGHT: 148.5 LBS | DIASTOLIC BLOOD PRESSURE: 60 MMHG | HEIGHT: 60 IN | SYSTOLIC BLOOD PRESSURE: 117 MMHG | OXYGEN SATURATION: 98 %

## 2022-04-04 DIAGNOSIS — E78.00 PURE HYPERCHOLESTEROLEMIA: ICD-10-CM

## 2022-04-04 DIAGNOSIS — I10 ESSENTIAL HYPERTENSION: ICD-10-CM

## 2022-04-04 DIAGNOSIS — E11.9 TYPE 2 DIABETES MELLITUS WITHOUT COMPLICATION, WITHOUT LONG-TERM CURRENT USE OF INSULIN: ICD-10-CM

## 2022-04-04 DIAGNOSIS — I25.118 CORONARY ARTERY DISEASE OF NATIVE ARTERY OF NATIVE HEART WITH STABLE ANGINA PECTORIS: ICD-10-CM

## 2022-04-04 DIAGNOSIS — I50.22 CHRONIC SYSTOLIC CONGESTIVE HEART FAILURE: Primary | ICD-10-CM

## 2022-04-04 PROCEDURE — 99214 OFFICE O/P EST MOD 30 MIN: CPT | Performed by: INTERNAL MEDICINE

## 2022-04-04 NOTE — PROGRESS NOTES
"    Subjective:     Encounter Date:04/04/2022      Patient ID: Goldie Mata is a 81 y.o. female.    Chief Complaint:  History of Present Illness 81-year-old white female with history of coronary disease congestive heart failure hypertension hyperlipidemia and diabetes presents to my office for follow-up.  Patient is currently stable without any symptoms of chest pain or shortness of breath at rest on exertion.  No complains of any PND or orthopnea.  No palpitation dizziness syncope or swelling of the feet.  Patient has been taking all the medicines regularly.  Patient does not smoke.  Patient is trying to exercise regularly she follows a good diet.    The following portions of the patient's history were reviewed and updated as appropriate: allergies, current medications, past family history, past medical history, past social history, past surgical history and problem list.  Past Medical History:   Diagnosis Date   • CHF (congestive heart failure) (HCC)    • Coronary artery disease    • Hyperlipidemia    • Hypertension    • Myocardial infarction (HCC)      History reviewed. No pertinent surgical history.  /60 (BP Location: Left arm, Patient Position: Sitting, Cuff Size: Adult)   Pulse 58   Ht 152.4 cm (60\")   Wt 67.4 kg (148 lb 8 oz)   SpO2 98%   BMI 29.00 kg/m²   Family History   Problem Relation Age of Onset   • Heart disease Mother    • Heart disease Father    • Heart disease Sister        Current Outpatient Medications:   •  aspirin 81 MG tablet, Take 81 mg by mouth Daily., Disp: , Rfl:   •  Cholecalciferol (Vitamin D3) 50 MCG (2000 UT) capsule, Take 2,000 Units by mouth 2 (Two) Times a Day. Take 2 capsules daily, Disp: , Rfl:   •  citalopram (CeleXA) 10 MG tablet, Take 10 mg by mouth Daily., Disp: , Rfl:   •  Cyanocobalamin 1000 MCG/ML kit, Inject  as directed Every 30 (Thirty) Days., Disp: , Rfl:   •  donepezil (ARICEPT) 5 MG tablet, TAKE ONE TABLET BY MOUTH EVERY NIGHT (LOWER DOSE), Disp: 90 " tablet, Rfl: 0  •  lisinopril (PRINIVIL,ZESTRIL) 2.5 MG tablet, Take 2.5 mg by mouth Daily., Disp: , Rfl:   •  metoprolol tartrate (LOPRESSOR) 50 MG tablet, Takes 1/2 tablet daily, Disp: 45 tablet, Rfl: 3  •  omeprazole (priLOSEC) 40 MG capsule, Take 40 mg by mouth Daily., Disp: , Rfl:   •  oxybutynin XL (DITROPAN-XL) 5 MG 24 hr tablet, Take 5 mg by mouth Daily., Disp: , Rfl: 5  •  rosuvastatin (CRESTOR) 20 MG tablet, Take 20 mg by mouth Daily., Disp: , Rfl:   •  fesoterodine fumarate (TOVIAZ ER) 4 MG tablet sustained-release 24 hour tablet, 4 mg Daily., Disp: , Rfl:   •  metoprolol tartrate (LOPRESSOR) 25 MG tablet, Take 12.5 mg by mouth Daily., Disp: , Rfl:   Allergies   Allergen Reactions   • Contrast Dye Unknown (See Comments)   • Hydrocodone-Acetaminophen Unknown (See Comments)   • Penicillin G Unknown (See Comments)     Social History     Socioeconomic History   • Marital status:    Tobacco Use   • Smoking status: Former Smoker   • Smokeless tobacco: Never Used   Vaping Use   • Vaping Use: Never used   Substance and Sexual Activity   • Alcohol use: Not Currently   • Drug use: Never   • Sexual activity: Defer     Review of Systems   Constitutional: Negative for fever and malaise/fatigue.   Cardiovascular: Negative for chest pain, dyspnea on exertion and palpitations.   Respiratory: Negative for cough and shortness of breath.    Skin: Negative for rash.   Gastrointestinal: Negative for abdominal pain, nausea and vomiting.   Neurological: Negative for focal weakness and headaches.   All other systems reviewed and are negative.             Objective:     Constitutional:       Appearance: Well-developed.   Eyes:      General: No scleral icterus.     Conjunctiva/sclera: Conjunctivae normal.   HENT:      Head: Normocephalic and atraumatic.   Neck:      Vascular: No carotid bruit or JVD.   Pulmonary:      Effort: Pulmonary effort is normal.      Breath sounds: Normal breath sounds. No wheezing. No rales.    Cardiovascular:      Normal rate. Regular rhythm.   Pulses:     Intact distal pulses.   Abdominal:      General: Bowel sounds are normal.      Palpations: Abdomen is soft.   Musculoskeletal:      Cervical back: Normal range of motion and neck supple. Skin:     General: Skin is warm and dry.      Findings: No rash.   Neurological:      Mental Status: Alert.       Procedures    Lab Review:         [facility]  1.  Coronary disease  Patient has nonobstructive disease now and is currently stable on medication  2.  Congestive heart failure  Patient has history of congestive heart failure but is currently stable on medications including metoprolol and lisinopril  3.  Hyperlipidemia  Patient is on statins with Crestor and the lipid levels are followed by the primary care doctor  4.  Diabetes  Patient is on oral medicines and followed by the primary care doctor  5.  Hypertension  Patient blood pressure currently stable on metoprolol lisinopril      Patient's previous medical records, labs, and EKG were reviewed and discussed with the patient at today's visit.

## 2022-06-27 RX ORDER — DONEPEZIL HYDROCHLORIDE 5 MG/1
TABLET, FILM COATED ORAL
Qty: 90 TABLET | Refills: 0 | Status: SHIPPED | OUTPATIENT
Start: 2022-06-27 | End: 2022-07-11 | Stop reason: SDUPTHER

## 2022-07-01 NOTE — PROGRESS NOTES
Chief Complaint  Follow-up (Memory loss)    Subjective          Goldie Mata presents to [facility] for Memory  History of Present Illness    F/U memory patient states she is about the same since last visit.   Patient is currently taking aricept 5 mg 1 qd and states medication is helping.    Pt feels her memory is stable.   No side effects from the Aricept    Reviewed mri brain, microvascular changes and atrophy as reported      ====MRI BRAIN WO CONTRAST- 1/24/22=====  IMPRESSION:  1. No acute intracranial findings.  2. Moderate bifrontal atrophy with focal left frontal lobe  encephalomalacia.  3. Moderately advanced chronic microvascular disease.         Current Outpatient Medications:   •  aspirin 81 MG tablet, Take 81 mg by mouth Daily., Disp: , Rfl:   •  Cholecalciferol (Vitamin D3) 50 MCG (2000 UT) capsule, Take 2,000 Units by mouth 2 (Two) Times a Day. Take 2 capsules daily, Disp: , Rfl:   •  citalopram (CeleXA) 10 MG tablet, Take 10 mg by mouth Daily., Disp: , Rfl:   •  Cyanocobalamin 1000 MCG/ML kit, Inject  as directed Every 30 (Thirty) Days., Disp: , Rfl:   •  donepezil (ARICEPT) 10 MG tablet, Take 1 tablet by mouth Every Night., Disp: 90 tablet, Rfl: 3  •  fesoterodine fumarate (TOVIAZ ER) 4 MG tablet sustained-release 24 hour tablet, 4 mg Daily., Disp: , Rfl:   •  lisinopril (PRINIVIL,ZESTRIL) 2.5 MG tablet, Take 2.5 mg by mouth Daily., Disp: , Rfl:   •  metoprolol tartrate (LOPRESSOR) 50 MG tablet, Takes 1/2 tablet daily, Disp: 45 tablet, Rfl: 3  •  omeprazole (priLOSEC) 40 MG capsule, Take 40 mg by mouth Daily., Disp: , Rfl:   •  oxybutynin XL (DITROPAN-XL) 5 MG 24 hr tablet, Take 5 mg by mouth Daily., Disp: , Rfl: 5  •  rosuvastatin (CRESTOR) 20 MG tablet, Take 20 mg by mouth Daily., Disp: , Rfl:     Review of Systems   Constitutional: Negative.    HENT: Negative.    Eyes: Negative.    Respiratory: Positive for cough.    Cardiovascular: Negative.    Gastrointestinal:  "Negative.    Genitourinary: Negative.    Musculoskeletal: Negative.    Neurological: Negative.    Psychiatric/Behavioral: Positive for confusion. Negative for hallucinations.          Objective:    Vital Signs:   /78   Pulse 54   Temp 98.2 °F (36.8 °C) (Temporal)   Ht 152.4 cm (60\")   Wt 68.5 kg (151 lb)   BMI 29.49 kg/m²     Physical Exam  Vitals reviewed.   Constitutional:       Appearance: Normal appearance.   Pulmonary:      Effort: Pulmonary effort is normal. No respiratory distress.   Neurological:      Mental Status: She is alert and oriented to person, place, and time. Mental status is at baseline.   Psychiatric:         Mood and Affect: Mood normal.         Behavior: Behavior normal.        Result Review :                Neurologic Exam     Mental Status   Oriented to person, place, and time.         Assessment and Plan    Diagnoses and all orders for this visit:    1. Memory loss (Primary)    Other orders  -     donepezil (ARICEPT) 10 MG tablet; Take 1 tablet by mouth Every Night.  Dispense: 90 tablet; Refill: 3     pt has mild memory and cognitive impairment  Increase aricept to 10mg per day    Continue s to take vitamin D po and B12 shots     Follow Up   Return in about 1 year (around 7/11/2023).  Patient was given instructions and counseling regarding her condition or for health maintenance advice. Please see specific information pulled into the AVS if appropriate.     This document has been electronically signed by Joseph Seipel, MD on July 11, 2022 09:19 EDT      "

## 2022-07-11 ENCOUNTER — OFFICE VISIT (OUTPATIENT)
Dept: NEUROLOGY | Facility: CLINIC | Age: 82
End: 2022-07-11

## 2022-07-11 VITALS
WEIGHT: 151 LBS | HEART RATE: 54 BPM | DIASTOLIC BLOOD PRESSURE: 78 MMHG | BODY MASS INDEX: 29.64 KG/M2 | HEIGHT: 60 IN | TEMPERATURE: 98.2 F | SYSTOLIC BLOOD PRESSURE: 131 MMHG

## 2022-07-11 DIAGNOSIS — R41.3 MEMORY LOSS: Primary | ICD-10-CM

## 2022-07-11 PROCEDURE — 99214 OFFICE O/P EST MOD 30 MIN: CPT | Performed by: PSYCHIATRY & NEUROLOGY

## 2022-07-11 RX ORDER — DONEPEZIL HYDROCHLORIDE 10 MG/1
10 TABLET, FILM COATED ORAL NIGHTLY
Qty: 90 TABLET | Refills: 3 | Status: SHIPPED | OUTPATIENT
Start: 2022-07-11

## 2022-11-21 ENCOUNTER — OFFICE VISIT (OUTPATIENT)
Dept: CARDIOLOGY | Facility: CLINIC | Age: 82
End: 2022-11-21

## 2022-11-21 VITALS
BODY MASS INDEX: 30.23 KG/M2 | HEIGHT: 60 IN | OXYGEN SATURATION: 98 % | HEART RATE: 62 BPM | SYSTOLIC BLOOD PRESSURE: 129 MMHG | DIASTOLIC BLOOD PRESSURE: 62 MMHG | WEIGHT: 154 LBS

## 2022-11-21 DIAGNOSIS — I25.118 CORONARY ARTERY DISEASE OF NATIVE ARTERY OF NATIVE HEART WITH STABLE ANGINA PECTORIS: ICD-10-CM

## 2022-11-21 DIAGNOSIS — I50.22 CHRONIC SYSTOLIC CONGESTIVE HEART FAILURE: Primary | ICD-10-CM

## 2022-11-21 DIAGNOSIS — E11.9 TYPE 2 DIABETES MELLITUS WITHOUT COMPLICATION, WITHOUT LONG-TERM CURRENT USE OF INSULIN: ICD-10-CM

## 2022-11-21 DIAGNOSIS — E78.00 PURE HYPERCHOLESTEROLEMIA: ICD-10-CM

## 2022-11-21 DIAGNOSIS — I10 ESSENTIAL HYPERTENSION: ICD-10-CM

## 2022-11-21 PROCEDURE — 99214 OFFICE O/P EST MOD 30 MIN: CPT | Performed by: INTERNAL MEDICINE

## 2022-11-21 NOTE — PROGRESS NOTES
"    Subjective:     Encounter Date:11/21/2022      Patient ID: Goldie Mata is a 81 y.o. female.    Chief Complaint:  History of Present Illness 81-year-old white female with history of congestive heart failure coronary disease hypertension hyperlipidemia diabetes presents to my office for follow-up.  Patient is currently stable without any symptoms of chest pain or shortness of breath at rest on exertion.  No complains any PND orthopnea.  No palpitation dizziness syncope or swelling of the feet.    The following portions of the patient's history were reviewed and updated as appropriate: allergies, current medications, past family history, past medical history, past social history, past surgical history and problem list.  Past Medical History:   Diagnosis Date   • CHF (congestive heart failure) (HCC)    • Coronary artery disease    • Hyperlipidemia    • Hypertension    • Myocardial infarction (HCC)      History reviewed. No pertinent surgical history.  /62   Pulse 62   Ht 152.4 cm (60\")   Wt 69.9 kg (154 lb)   SpO2 98%   BMI 30.08 kg/m²   Family History   Problem Relation Age of Onset   • Heart disease Mother    • Heart disease Father    • Heart disease Sister        Current Outpatient Medications:   •  aspirin 81 MG tablet, Take 81 mg by mouth Daily., Disp: , Rfl:   •  Cholecalciferol (Vitamin D3) 50 MCG (2000 UT) capsule, Take 2,000 Units by mouth 2 (Two) Times a Day. Take 2 capsules daily, Disp: , Rfl:   •  citalopram (CeleXA) 10 MG tablet, Take 10 mg by mouth Daily., Disp: , Rfl:   •  Cyanocobalamin 1000 MCG/ML kit, Inject  as directed Every 30 (Thirty) Days., Disp: , Rfl:   •  donepezil (ARICEPT) 10 MG tablet, Take 1 tablet by mouth Every Night., Disp: 90 tablet, Rfl: 3  •  lisinopril (PRINIVIL,ZESTRIL) 2.5 MG tablet, Take 2.5 mg by mouth Daily., Disp: , Rfl:   •  metoprolol tartrate (LOPRESSOR) 50 MG tablet, Takes 1/2 tablet daily, Disp: 45 tablet, Rfl: 3  •  omeprazole (priLOSEC) 40 MG capsule, " Take 40 mg by mouth Daily., Disp: , Rfl:   •  oxybutynin XL (DITROPAN-XL) 5 MG 24 hr tablet, Take 5 mg by mouth Daily., Disp: , Rfl: 5  •  rosuvastatin (CRESTOR) 20 MG tablet, Take 20 mg by mouth Daily., Disp: , Rfl:   Allergies   Allergen Reactions   • Contrast Dye Unknown (See Comments)   • Hydrocodone-Acetaminophen Unknown (See Comments)   • Penicillin G Unknown (See Comments)     Social History     Socioeconomic History   • Marital status:    Tobacco Use   • Smoking status: Former   • Smokeless tobacco: Never   Vaping Use   • Vaping Use: Never used   Substance and Sexual Activity   • Alcohol use: Not Currently   • Drug use: Never   • Sexual activity: Defer     Review of Systems   Constitutional: Negative for malaise/fatigue.   Cardiovascular: Negative for chest pain, dyspnea on exertion, leg swelling and palpitations.   Respiratory: Negative for cough and shortness of breath.    Gastrointestinal: Negative for abdominal pain, nausea and vomiting.   Neurological: Negative for dizziness, focal weakness, headaches, light-headedness and numbness.   All other systems reviewed and are negative.             Objective:     Constitutional:       Appearance: Well-developed.   Eyes:      General: No scleral icterus.     Conjunctiva/sclera: Conjunctivae normal.   HENT:      Head: Normocephalic and atraumatic.   Neck:      Vascular: No carotid bruit or JVD.   Pulmonary:      Effort: Pulmonary effort is normal.      Breath sounds: Normal breath sounds. No wheezing. No rales.   Cardiovascular:      Normal rate. Regular rhythm.   Pulses:     Intact distal pulses.   Abdominal:      General: Bowel sounds are normal.      Palpations: Abdomen is soft.   Musculoskeletal:      Cervical back: Normal range of motion and neck supple. Skin:     General: Skin is warm and dry.      Findings: No rash.   Neurological:      Mental Status: Alert.       Procedures    Lab Review:         MDM  1.  Coronary disease  Patient has  nonobstructive disease in the past and is currently stable on medications including aspirin metoprolol and Crestor  2.  Congestive heart failure  Patient has LV systolic dysfunction the past but is stable with metoprolol and lisinopril  3.  Hypertension  Patient blood pressure currently stable on medications including metoprolol and lisinopril  4.  Hyperlipidemia  Patient on Crestor and the lipid levels are well within normal limits      Patient's previous medical records, labs, and EKG were reviewed and discussed with the patient at today's visit.

## 2022-12-05 RX ORDER — METOPROLOL TARTRATE 50 MG/1
TABLET, FILM COATED ORAL
Qty: 45 TABLET | Refills: 2 | Status: SHIPPED | OUTPATIENT
Start: 2022-12-05

## 2022-12-05 NOTE — TELEPHONE ENCOUNTER
Rx Refill Note  Requested Prescriptions     Pending Prescriptions Disp Refills   • metoprolol tartrate (LOPRESSOR) 50 MG tablet [Pharmacy Med Name: METOPROLOL TARTRATE 50MG^] 45 tablet 2     Sig: TAKE HALF A TABLET BY MOUTH EVERY DAY      Last office visit with prescribing clinician: 11/21/2022   Last telemedicine visit with prescribing clinician: 5/22/2023   Next office visit with prescribing clinician: 5/22/2023                         Would you like a call back once the refill request has been completed: [] Yes [] No    If the office needs to give you a call back, can they leave a voicemail: [] Yes [] No    Viri Shah MA  12/05/22, 11:34 EST

## 2022-12-12 ENCOUNTER — TELEPHONE (OUTPATIENT)
Dept: URGENT CARE | Facility: CLINIC | Age: 82
End: 2022-12-12

## 2022-12-12 ENCOUNTER — HOSPITAL ENCOUNTER (OUTPATIENT)
Dept: GENERAL RADIOLOGY | Facility: HOSPITAL | Age: 82
Discharge: HOME | End: 2022-12-12
Admitting: PHYSICIAN ASSISTANT

## 2022-12-12 DIAGNOSIS — R05.1 ACUTE COUGH: Primary | ICD-10-CM

## 2022-12-12 PROCEDURE — 71046 X-RAY EXAM CHEST 2 VIEWS: CPT

## 2022-12-12 RX ORDER — DEXTROMETHORPHAN HYDROBROMIDE AND PROMETHAZINE HYDROCHLORIDE 15; 6.25 MG/5ML; MG/5ML
5 SYRUP ORAL NIGHTLY PRN
Qty: 25 ML | Refills: 0 | Status: SHIPPED | OUTPATIENT
Start: 2022-12-12 | End: 2022-12-17

## 2022-12-12 RX ORDER — LORATADINE 10 MG
1 CAPSULE ORAL EVERY 12 HOURS
Qty: 10 EACH | Refills: 0 | Status: SHIPPED | OUTPATIENT
Start: 2022-12-12 | End: 2022-12-17

## 2022-12-12 NOTE — TELEPHONE ENCOUNTER
Discussed chest x-ray results with patient and granddaughter.  Went over all results of chest x-ray and explained most likely heard left scarring for crackles as pneumonia is not present.  Will send in cough medication for patient, but should follow-up with primary care if needed, otherwise she should continue to improve and feel better.  All questions and concerns addressed at this time.

## 2023-05-22 ENCOUNTER — OFFICE VISIT (OUTPATIENT)
Dept: CARDIOLOGY | Facility: CLINIC | Age: 83
End: 2023-05-22
Payer: MEDICARE

## 2023-05-22 VITALS
DIASTOLIC BLOOD PRESSURE: 56 MMHG | BODY MASS INDEX: 27.94 KG/M2 | HEIGHT: 61 IN | HEART RATE: 60 BPM | WEIGHT: 148 LBS | SYSTOLIC BLOOD PRESSURE: 100 MMHG | OXYGEN SATURATION: 94 %

## 2023-05-22 DIAGNOSIS — I25.118 CORONARY ARTERY DISEASE OF NATIVE ARTERY OF NATIVE HEART WITH STABLE ANGINA PECTORIS: Primary | ICD-10-CM

## 2023-05-22 DIAGNOSIS — E11.9 TYPE 2 DIABETES MELLITUS WITHOUT COMPLICATION, WITHOUT LONG-TERM CURRENT USE OF INSULIN: ICD-10-CM

## 2023-05-22 DIAGNOSIS — E78.00 PURE HYPERCHOLESTEROLEMIA: ICD-10-CM

## 2023-05-22 DIAGNOSIS — I10 ESSENTIAL HYPERTENSION: ICD-10-CM

## 2023-05-22 DIAGNOSIS — I50.22 CHRONIC SYSTOLIC CONGESTIVE HEART FAILURE: ICD-10-CM

## 2023-05-22 NOTE — PROGRESS NOTES
"    Subjective:     Encounter Date:05/22/2023      Patient ID: Goldie Mata is a 82 y.o. female.    Chief Complaint:  History of Present Illness 82-year-old white female with history of coronary disease congestive heart failure hypertension hyperlipidemia and diabetes presents to my office for a follow-up.  Patient is currently stable without any symptoms of chest pain or shortness of breath at rest or exertion.  No complaint of any PND orthopnea.  No palpitation dizziness syncope or swelling of the feet.  Patient has been taking all medicines regularly.  Patient does not smoke    The following portions of the patient's history were reviewed and updated as appropriate: allergies, current medications, past family history, past medical history, past social history, past surgical history and problem list.  Past Medical History:   Diagnosis Date   • Breast cancer    • CHF (congestive heart failure)    • Colon cancer    • Coronary artery disease    • Hyperlipidemia    • Hypertension    • Lung cancer    • Myocardial infarction      Past Surgical History:   Procedure Laterality Date   • MASTECTOMY       /56   Pulse 60   Ht 154.9 cm (60.98\")   Wt 67.1 kg (148 lb)   SpO2 94%   BMI 27.98 kg/m²   Family History   Problem Relation Age of Onset   • Heart disease Mother    • Heart disease Father    • Heart disease Sister        Current Outpatient Medications:   •  aspirin 81 MG tablet, Take 1 tablet by mouth Daily., Disp: , Rfl:   •  benzonatate (TESSALON) 200 MG capsule, Take 1 capsule by mouth 3 (Three) Times a Day As Needed for Cough., Disp: 30 capsule, Rfl: 0  •  Cholecalciferol (Vitamin D3) 50 MCG (2000 UT) capsule, Take 1 capsule by mouth 2 (Two) Times a Day. Take 2 capsules daily, Disp: , Rfl:   •  citalopram (CeleXA) 10 MG tablet, Take 1 tablet by mouth Daily., Disp: , Rfl:   •  Cyanocobalamin 1000 MCG/ML kit, Inject  as directed Every 30 (Thirty) Days., Disp: , Rfl:   •  donepezil (ARICEPT) 10 MG tablet, " Take 1 tablet by mouth Every Night., Disp: 90 tablet, Rfl: 3  •  lisinopril (PRINIVIL,ZESTRIL) 2.5 MG tablet, Take 1 tablet by mouth Daily., Disp: , Rfl:   •  metoprolol tartrate (LOPRESSOR) 50 MG tablet, TAKE HALF A TABLET BY MOUTH EVERY DAY, Disp: 45 tablet, Rfl: 2  •  omeprazole (priLOSEC) 40 MG capsule, Take 1 capsule by mouth Daily., Disp: , Rfl:   •  oxybutynin XL (DITROPAN-XL) 5 MG 24 hr tablet, Take 1 tablet by mouth Daily., Disp: , Rfl: 5  •  rosuvastatin (CRESTOR) 20 MG tablet, Take 1 tablet by mouth Daily., Disp: , Rfl:   Allergies   Allergen Reactions   • Contrast Dye (Echo Or Unknown Ct/Mr) Unknown (See Comments)   • Hydrocodone-Acetaminophen Unknown (See Comments)   • Penicillin G Unknown (See Comments)     Social History     Socioeconomic History   • Marital status:    Tobacco Use   • Smoking status: Former     Passive exposure: Never   • Smokeless tobacco: Never   Vaping Use   • Vaping Use: Never used   Substance and Sexual Activity   • Alcohol use: Not Currently   • Drug use: Never   • Sexual activity: Defer     Review of Systems   Constitutional: Negative for malaise/fatigue.   Cardiovascular: Negative for chest pain, dyspnea on exertion, leg swelling and palpitations.   Respiratory: Negative for cough and shortness of breath.    Gastrointestinal: Negative for abdominal pain, nausea and vomiting.   Neurological: Negative for dizziness, focal weakness, headaches, light-headedness and numbness.   All other systems reviewed and are negative.             Objective:     Constitutional:       Appearance: Well-developed.   Eyes:      General: No scleral icterus.     Conjunctiva/sclera: Conjunctivae normal.   HENT:      Head: Normocephalic and atraumatic.   Neck:      Vascular: No carotid bruit or JVD.   Pulmonary:      Effort: Pulmonary effort is normal.      Breath sounds: Normal breath sounds. No wheezing. No rales.   Cardiovascular:      Normal rate. Regular rhythm.      Murmurs: There is a  systolic murmur.   Pulses:     Intact distal pulses.   Abdominal:      General: Bowel sounds are normal.      Palpations: Abdomen is soft.   Musculoskeletal:      Cervical back: Normal range of motion and neck supple. Skin:     General: Skin is warm and dry.      Findings: No rash.   Neurological:      Mental Status: Alert.       Procedures    Lab Review:         [facility]  1.  Coronary disease  Patient has nonobstructive disease in the past and is currently stable on medications  2.  Congestive heart failure  Patient history of congestive heart failure with LV systolic dysfunction is currently stable on medications including beta-blockers and lisinopril  3.  Hyperlipidemia  Patient is on Crestor and the lipid levels are well within normal limits  4.  Hypertension  Patient blood pressure currently stable on metoprolol and lisinopril  5.  Diabetes  Patient is on over-the-counter medicines and diet therapy only      Patient's previous medical records, labs, and EKG were reviewed and discussed with the patient at today's visit.    home

## 2023-06-19 DIAGNOSIS — R91.1 LUNG NODULE: Primary | ICD-10-CM

## 2023-06-19 DIAGNOSIS — R59.0 HILAR LYMPHADENOPATHY: ICD-10-CM

## 2023-06-19 DIAGNOSIS — I27.9 PULMONARY HEART DISEASE, UNSPECIFIED: ICD-10-CM

## 2023-06-19 DIAGNOSIS — C34.90 MALIGNANT NEOPLASM OF LUNG, UNSPECIFIED LATERALITY, UNSPECIFIED PART OF LUNG: ICD-10-CM

## 2023-07-21 ENCOUNTER — HOSPITAL ENCOUNTER (OUTPATIENT)
Facility: HOSPITAL | Age: 83
Setting detail: HOSPITAL OUTPATIENT SURGERY
Discharge: HOME | End: 2023-07-21
Attending: INTERNAL MEDICINE | Admitting: INTERNAL MEDICINE
Payer: MEDICARE

## 2023-07-21 VITALS
SYSTOLIC BLOOD PRESSURE: 147 MMHG | OXYGEN SATURATION: 92 % | WEIGHT: 151.8 LBS | RESPIRATION RATE: 16 BRPM | BODY MASS INDEX: 28.66 KG/M2 | DIASTOLIC BLOOD PRESSURE: 59 MMHG | HEART RATE: 66 BPM | HEIGHT: 61 IN | TEMPERATURE: 97.4 F

## 2023-07-21 DIAGNOSIS — R59.0 HILAR LYMPHADENOPATHY: ICD-10-CM

## 2023-07-21 DIAGNOSIS — R91.1 LUNG NODULE: ICD-10-CM

## 2023-07-21 DIAGNOSIS — C34.90 MALIGNANT NEOPLASM OF LUNG, UNSPECIFIED LATERALITY, UNSPECIFIED PART OF LUNG: ICD-10-CM

## 2023-07-21 LAB
APTT PPP: 25 SECONDS (ref 24–31)
B PARAPERT DNA SPEC QL NAA+PROBE: NOT DETECTED
B PERT DNA SPEC QL NAA+PROBE: NOT DETECTED
BASOPHILS # BLD AUTO: 0 10*3/MM3 (ref 0–0.2)
BASOPHILS NFR BLD AUTO: 0.9 % (ref 0–1.5)
C PNEUM DNA NPH QL NAA+NON-PROBE: NOT DETECTED
DEPRECATED RDW RBC AUTO: 44.6 FL (ref 37–54)
EOSINOPHIL # BLD AUTO: 0.2 10*3/MM3 (ref 0–0.4)
EOSINOPHIL NFR BLD AUTO: 4.4 % (ref 0.3–6.2)
ERYTHROCYTE [DISTWIDTH] IN BLOOD BY AUTOMATED COUNT: 14.5 % (ref 12.3–15.4)
FLUAV SUBTYP SPEC NAA+PROBE: NOT DETECTED
FLUBV RNA ISLT QL NAA+PROBE: NOT DETECTED
HADV DNA SPEC NAA+PROBE: NOT DETECTED
HCOV 229E RNA SPEC QL NAA+PROBE: NOT DETECTED
HCOV HKU1 RNA SPEC QL NAA+PROBE: NOT DETECTED
HCOV NL63 RNA SPEC QL NAA+PROBE: NOT DETECTED
HCOV OC43 RNA SPEC QL NAA+PROBE: NOT DETECTED
HCT VFR BLD AUTO: 40.6 % (ref 34–46.6)
HGB BLD-MCNC: 13.1 G/DL (ref 12–15.9)
HMPV RNA NPH QL NAA+NON-PROBE: NOT DETECTED
HPIV1 RNA ISLT QL NAA+PROBE: NOT DETECTED
HPIV2 RNA SPEC QL NAA+PROBE: NOT DETECTED
HPIV3 RNA NPH QL NAA+PROBE: NOT DETECTED
HPIV4 P GENE NPH QL NAA+PROBE: NOT DETECTED
INR PPP: <0.93 (ref 0.93–1.1)
LYMPHOCYTES # BLD AUTO: 1.1 10*3/MM3 (ref 0.7–3.1)
LYMPHOCYTES NFR BLD AUTO: 20.5 % (ref 19.6–45.3)
M PNEUMO IGG SER IA-ACNC: NOT DETECTED
MCH RBC QN AUTO: 28.6 PG (ref 26.6–33)
MCHC RBC AUTO-ENTMCNC: 32.1 G/DL (ref 31.5–35.7)
MCV RBC AUTO: 89 FL (ref 79–97)
MONOCYTES # BLD AUTO: 0.5 10*3/MM3 (ref 0.1–0.9)
MONOCYTES NFR BLD AUTO: 9.4 % (ref 5–12)
NEUTROPHILS NFR BLD AUTO: 3.6 10*3/MM3 (ref 1.7–7)
NEUTROPHILS NFR BLD AUTO: 64.8 % (ref 42.7–76)
NRBC BLD AUTO-RTO: 0 /100 WBC (ref 0–0.2)
PLATELET # BLD AUTO: 203 10*3/MM3 (ref 140–450)
PMV BLD AUTO: 8 FL (ref 6–12)
PROTHROMBIN TIME: 9.7 SECONDS (ref 9.6–11.7)
RBC # BLD AUTO: 4.56 10*6/MM3 (ref 3.77–5.28)
RHINOVIRUS RNA SPEC NAA+PROBE: NOT DETECTED
RSV RNA NPH QL NAA+NON-PROBE: NOT DETECTED
SARS-COV-2 RNA NPH QL NAA+NON-PROBE: NOT DETECTED
WBC NRBC COR # BLD: 5.6 10*3/MM3 (ref 3.4–10.8)

## 2023-07-21 PROCEDURE — 88177 CYTP FNA EVAL EA ADDL: CPT | Performed by: INTERNAL MEDICINE

## 2023-07-21 PROCEDURE — C1726 CATH, BAL DIL, NON-VASCULAR: HCPCS | Performed by: INTERNAL MEDICINE

## 2023-07-21 PROCEDURE — 87252 VIRUS INOCULATION TISSUE: CPT | Performed by: INTERNAL MEDICINE

## 2023-07-21 PROCEDURE — 85610 PROTHROMBIN TIME: CPT | Performed by: INTERNAL MEDICINE

## 2023-07-21 PROCEDURE — 0202U NFCT DS 22 TRGT SARS-COV-2: CPT | Performed by: INTERNAL MEDICINE

## 2023-07-21 PROCEDURE — 85730 THROMBOPLASTIN TIME PARTIAL: CPT | Performed by: INTERNAL MEDICINE

## 2023-07-21 PROCEDURE — 85025 COMPLETE CBC W/AUTO DIFF WBC: CPT | Performed by: INTERNAL MEDICINE

## 2023-07-21 PROCEDURE — 87385 HISTOPLASMA CAPSUL AG IA: CPT | Performed by: INTERNAL MEDICINE

## 2023-07-21 PROCEDURE — 87116 MYCOBACTERIA CULTURE: CPT | Performed by: INTERNAL MEDICINE

## 2023-07-21 PROCEDURE — 87206 SMEAR FLUORESCENT/ACID STAI: CPT | Performed by: INTERNAL MEDICINE

## 2023-07-21 PROCEDURE — 87102 FUNGUS ISOLATION CULTURE: CPT | Performed by: INTERNAL MEDICINE

## 2023-07-21 PROCEDURE — 88305 TISSUE EXAM BY PATHOLOGIST: CPT | Performed by: INTERNAL MEDICINE

## 2023-07-21 PROCEDURE — 87205 SMEAR GRAM STAIN: CPT | Performed by: INTERNAL MEDICINE

## 2023-07-21 PROCEDURE — 36415 COLL VENOUS BLD VENIPUNCTURE: CPT | Performed by: INTERNAL MEDICINE

## 2023-07-21 PROCEDURE — 88108 CYTOPATH CONCENTRATE TECH: CPT | Performed by: INTERNAL MEDICINE

## 2023-07-21 PROCEDURE — 87071 CULTURE AEROBIC QUANT OTHER: CPT | Performed by: INTERNAL MEDICINE

## 2023-07-21 PROCEDURE — 88172 CYTP DX EVAL FNA 1ST EA SITE: CPT | Performed by: INTERNAL MEDICINE

## 2023-07-21 RX ORDER — SODIUM CHLORIDE 9 MG/ML
50 INJECTION, SOLUTION INTRAVENOUS CONTINUOUS
Status: DISCONTINUED | OUTPATIENT
Start: 2023-07-21 | End: 2023-07-21 | Stop reason: HOSPADM

## 2023-07-21 RX ADMIN — SODIUM CHLORIDE 50 ML/HR: 9 INJECTION, SOLUTION INTRAVENOUS at 06:48

## 2023-07-21 NOTE — H&P
Patient Care Team:  Olman Hatch PA-C as PCP - General (Physician Assistant)  Jon Gilbert MD as Consulting Physician (Cardiology)    Chief complaint abnormal CT scan        Assessment & Plan   Abnormal CT scan with the left lower lobe nodule and left hilar adenopathy positive PET scan  History of lung cancer  Dementia  Stable CAD  Compensated CHF  HTN  HLD    Plan:  Bronchoscopy with EBUS today, plan to discharge home and follow-up on the results as an outpatient.      History  82-year-old female with history of lung cancer 2009 was followed by the oncologist Dr. Christian and was found on PET scan to have possible uptake in the superior segment of left lower lobe and the left hilar nodule.  I saw the patient in the office 6/19/2023 and had a lengthy discussion with the patient and family about work-up.  The patient has dementia so her daughter is making the decisions for her.  The decision was to proceed with bronchoscopy and EBUS to sample the left hilar lymphadenopathy understanding that possibly getting a positive diagnosis will be around 60% with due to the size of the lymph node.      Lung nodule    Hilar lymphadenopathy    Malignant neoplasm of lung      Past Medical History:   Diagnosis Date    Breast cancer     CHF (congestive heart failure)     Colon cancer     Coronary artery disease     Hyperlipidemia     Hypertension     Lung cancer     Myocardial infarction        Past Surgical History:   Procedure Laterality Date    COLON SURGERY      CORONARY ANGIOPLASTY      greater than 5 years ago from      2023    HYSTERECTOMY      MASTECTOMY         Family History   Problem Relation Age of Onset    Heart disease Mother     Heart disease Father     Heart disease Sister        Social History     Socioeconomic History    Marital status:    Tobacco Use    Smoking status: Former     Passive exposure: Never    Smokeless tobacco: Never   Vaping Use    Vaping Use: Never used   Substance and Sexual Activity     Alcohol use: Not Currently    Drug use: Never    Sexual activity: Defer       Review of Systems  Review of Systems   Constitutional: Negative for chills, fever and malaise/fatigue.   HENT: Negative.    Eyes: Negative.    Cardiovascular: Negative.    Respiratory: Negative for cough and shortness of breath.    Skin: Negative.    Musculoskeletal: Negative.    Gastrointestinal: Negative.    Genitourinary: Negative.    Neurological: Dementia  Vital Signs  Temp:  [97.8 °F (36.6 °C)] 97.8 °F (36.6 °C)  Heart Rate:  [55] 55  Resp:  [19] 19  BP: (142)/(58) 142/58    Physical Exam:  Physical Exam  General Appearance:  Alert   HEENT:  Normocephalic, without obvious abnormality, Conjunctiva/corneas clear,.   Nares normal, no drainage     Neck:  Supple, symmetrical, trachea midline. No JVD.  Lungs /Chest wall:   Good air entry bilaterally without wheezing or rhonchi, respirations unlabored, symmetrical wall movement.     Heart:  Regular rate and rhythm, S1 S2 normal  Abdomen: Soft, non-tender, no masses, no organomegaly.    Extremities: No edema, no clubbing or cyanosis    Radiology  Imaging Results (Last 24 Hours)       ** No results found for the last 24 hours. **            Labs:  Results from last 7 days   Lab Units 07/21/23  0634   WBC 10*3/mm3 5.60   HEMOGLOBIN g/dL 13.1   HEMATOCRIT % 40.6   PLATELETS 10*3/mm3 203           Invalid input(s): LABALBU, PROT                      Results from last 7 days   Lab Units 07/21/23  0630   INR  <0.93*   APTT seconds 25.0               Meds:   SCHEDULE     Infusions  sodium chloride, 50 mL/hr, Last Rate: 50 mL/hr (07/21/23 0648)      PRNs        I discussed the patient's findings and my recommendations with patient, family, and nursing staff.     Carolyn Sierra MD  07/21/23  09:27 EDT

## 2023-07-21 NOTE — DISCHARGE INSTRUCTIONS
Do not drink alcohol, drive, operate any heavy machinery or power tools, or make any important/legal decisions for the next 24 hours.    Call you doctor immediately if you experience severe chest pain, shortness of breath, bleeding or coughing up blood, or fever over 101 F.    Diet: Nothing by mouth until: discharge    After a bronchoscopy, you may experience a scratchy throat. This will gradually get better. You may gargle with warm salt water for this after the time noted above is over.     A responsible adult should stay with you and you should rest quietly for the rest of the day. Follow up with MD as instructed.

## 2023-07-21 NOTE — PROCEDURES
Bronchoscopy Procedure Note    Procedure:  Bronchoscopy, Diagnostic  Bronchoalveolar lavage, BAL of left lower lobe  EBUS guided transbronchial FNA of left hilar lymph node    Pre-Operative Diagnosis: Left lower lobe nodule and left hilar lymphadenopathy    Post-Operative Diagnosis: Same    Indication: Positive PET scan in the superior segment left lower lobe and left hilum, history of lung cancer    Anesthesia: General Anesthesia    Procedure Details: Patient was consented for the procedure with all risk and benefit of the procedure explained in detail.  Patient was given the opportunity to ask questions and all concerns were answered.    Timeout was done in the standard manner   After anesthesia intubated the patient and initiating general anesthesia  the bronchoscope was inserted into the main airway via the endotracheal tube. An anatomical survey was done of the main airways and the subsegmental bronchus of the 5 lobes.  The findings are consistent of no endobronchial lesion was seen.  A bronchoalveolar lavage was performed superior segment of the left lower lobe using aliquots of normal saline instilled into the airways then aspirated back 25 mL    The bronchoscope was removed and the EBUS scope was inserted with ultrasound survey of the left hilum indicating around 1 cm lymph node, using a size 21 needle 7 passes performed with bedside pathology evaluation indicating no malignancy    Estimated Blood Loss: Minimal           Specimens: BAL from left lower lobe, transbronchial FNA of left hilar x7                Complications:  None; patient tolerated the procedure well.           Disposition: PACU - hemodynamically stable.    Post op plan:  Resume p.o. after 2 hours  Follow-up results as an outpatient    Patient tolerated the procedure well.

## 2023-07-23 LAB
BACTERIA SPEC AEROBE CULT: NO GROWTH
GRAM STN SPEC: NORMAL
GRAM STN SPEC: NORMAL

## 2023-07-24 LAB
BEAKER LAB AP INTRAOPERATIVE CONSULTATION: NORMAL
LAB AP CASE REPORT: NORMAL
LAB AP CASE REPORT: NORMAL
LAB AP DIAGNOSIS COMMENT: NORMAL
PATH REPORT.FINAL DX SPEC: NORMAL
PATH REPORT.FINAL DX SPEC: NORMAL
PATH REPORT.GROSS SPEC: NORMAL
PATH REPORT.GROSS SPEC: NORMAL

## 2023-07-26 LAB — REF LAB TEST METHOD: NORMAL

## 2023-07-28 LAB
FUNGUS WND CULT: NORMAL
MYCOBACTERIUM SPEC CULT: NORMAL
NIGHT BLUE STAIN TISS: NORMAL

## 2023-07-31 LAB — VIRUS SPEC CULT: NORMAL

## 2023-08-04 LAB
FUNGUS WND CULT: NORMAL
MYCOBACTERIUM SPEC CULT: NORMAL
NIGHT BLUE STAIN TISS: NORMAL

## 2023-08-14 RX ORDER — DONEPEZIL HYDROCHLORIDE 10 MG/1
TABLET, FILM COATED ORAL
Qty: 90 TABLET | Refills: 2 | Status: SHIPPED | OUTPATIENT
Start: 2023-08-14

## 2023-08-17 LAB — FUNGUS WND CULT: NORMAL

## 2023-08-18 LAB
MYCOBACTERIUM SPEC CULT: NORMAL
NIGHT BLUE STAIN TISS: NORMAL

## 2023-08-28 RX ORDER — METOPROLOL TARTRATE 50 MG/1
TABLET, FILM COATED ORAL
Qty: 45 TABLET | Refills: 1 | Status: SHIPPED | OUTPATIENT
Start: 2023-08-28

## 2023-08-28 NOTE — TELEPHONE ENCOUNTER
Rx Refill Note  Requested Prescriptions     Pending Prescriptions Disp Refills    metoprolol tartrate (LOPRESSOR) 50 MG tablet [Pharmacy Med Name: METOPROLOL TARTRATE 50MG^] 45 tablet 1     Sig: TAKE HALF A TABLET BY MOUTH EVERY DAY      Last office visit with prescribing clinician: 5/22/2023   Last telemedicine visit with prescribing clinician: Visit date not found   Next office visit with prescribing clinician: 12/4/2023                         Would you like a call back once the refill request has been completed: [] Yes [] No    If the office needs to give you a call back, can they leave a voicemail: [] Yes [] No    Tomasa Meredith MA  08/28/23, 10:34 EDT

## 2023-09-01 LAB
MYCOBACTERIUM SPEC CULT: NORMAL
NIGHT BLUE STAIN TISS: NORMAL

## 2023-12-04 ENCOUNTER — OFFICE VISIT (OUTPATIENT)
Dept: CARDIOLOGY | Facility: CLINIC | Age: 83
End: 2023-12-04
Payer: MEDICARE

## 2023-12-04 VITALS
WEIGHT: 146 LBS | SYSTOLIC BLOOD PRESSURE: 117 MMHG | DIASTOLIC BLOOD PRESSURE: 67 MMHG | HEART RATE: 54 BPM | OXYGEN SATURATION: 99 % | BODY MASS INDEX: 27.56 KG/M2 | HEIGHT: 61 IN

## 2023-12-04 DIAGNOSIS — E11.9 TYPE 2 DIABETES MELLITUS WITHOUT COMPLICATION, WITHOUT LONG-TERM CURRENT USE OF INSULIN: ICD-10-CM

## 2023-12-04 DIAGNOSIS — I10 ESSENTIAL HYPERTENSION: ICD-10-CM

## 2023-12-04 DIAGNOSIS — E78.00 PURE HYPERCHOLESTEROLEMIA: ICD-10-CM

## 2023-12-04 DIAGNOSIS — I50.22 CHRONIC SYSTOLIC CONGESTIVE HEART FAILURE: ICD-10-CM

## 2023-12-04 DIAGNOSIS — I25.118 CORONARY ARTERY DISEASE OF NATIVE ARTERY OF NATIVE HEART WITH STABLE ANGINA PECTORIS: Primary | ICD-10-CM

## 2023-12-04 PROCEDURE — 3074F SYST BP LT 130 MM HG: CPT | Performed by: INTERNAL MEDICINE

## 2023-12-04 PROCEDURE — 1160F RVW MEDS BY RX/DR IN RCRD: CPT | Performed by: INTERNAL MEDICINE

## 2023-12-04 PROCEDURE — 99214 OFFICE O/P EST MOD 30 MIN: CPT | Performed by: INTERNAL MEDICINE

## 2023-12-04 PROCEDURE — 3078F DIAST BP <80 MM HG: CPT | Performed by: INTERNAL MEDICINE

## 2023-12-04 PROCEDURE — 1159F MED LIST DOCD IN RCRD: CPT | Performed by: INTERNAL MEDICINE

## 2023-12-04 NOTE — PROGRESS NOTES
"    Subjective:     Encounter Date:12/04/2023      Patient ID: Goldie Mata is a 82 y.o. female.    Chief Complaint:  Coronary Artery Disease  Pertinent negatives include no chest pain, dizziness, leg swelling, palpitations or shortness of breath.   82-year-old white female with history of coronary disease history of congestive heart failure hypertension hyperlipidemia diabetes presents to the office for a follow-up.  Patient is currently still without any symptoms of chest pain or shortness of breath at rest or exertion.  No complains any PND orthopnea.  No palpitation dizziness syncope or swelling of the feet.  Patient is taking all her medicines regular.  Patient does not smoke.    The following portions of the patient's history were reviewed and updated as appropriate: allergies, current medications, past family history, past medical history, past social history, past surgical history, and problem list.  Past Medical History:   Diagnosis Date    Breast cancer     CHF (congestive heart failure)     Colon cancer     Coronary artery disease     Hyperlipidemia     Hypertension     Lung cancer     Myocardial infarction      Past Surgical History:   Procedure Laterality Date    BRONCHOSCOPY N/A 7/21/2023    Procedure: BRONCHOSCOPY WITH BRONCHOALVEOLAR LAVAGE AND ENDOBRONCHIAL ULTRASOUND WITH FINE NEEDLE ASPIRATION;  Surgeon: Carolyn Sierra MD;  Location: [facility];  Service: Pulmonary;  Laterality: N/A;    COLON SURGERY      CORONARY ANGIOPLASTY      greater than 5 years ago from      2023    HYSTERECTOMY      MASTECTOMY       /67   Pulse 54   Ht 154.9 cm (60.98\")   Wt 66.2 kg (146 lb)   SpO2 99%   BMI 27.60 kg/m²   Family History   Problem Relation Age of Onset    Heart disease Mother     Heart disease Father     Heart disease Sister        Current Outpatient Medications:     aspirin 81 MG tablet, Take 1 tablet by mouth Daily., Disp: , Rfl:     Cholecalciferol (Vitamin D3) 50 MCG (2000 UT) " capsule, Take 1 capsule by mouth 2 (Two) Times a Day. Take 2 capsules daily, Disp: , Rfl:     citalopram (CeleXA) 10 MG tablet, Take 1 tablet by mouth Daily., Disp: , Rfl:     Cyanocobalamin 1000 MCG/ML kit, Inject  as directed Every 30 (Thirty) Days., Disp: , Rfl:     donepezil (ARICEPT) 10 MG tablet, TAKE 1 TABLET BY MOUTH EVERY EVENING, Disp: 90 tablet, Rfl: 2    lisinopril (PRINIVIL,ZESTRIL) 2.5 MG tablet, Take 1 tablet by mouth Daily., Disp: , Rfl:     metoprolol tartrate (LOPRESSOR) 50 MG tablet, TAKE HALF A TABLET BY MOUTH EVERY DAY, Disp: 45 tablet, Rfl: 1    omeprazole (priLOSEC) 40 MG capsule, Take 1 capsule by mouth Daily., Disp: , Rfl:     oxybutynin XL (DITROPAN-XL) 5 MG 24 hr tablet, Take 1 tablet by mouth Daily., Disp: , Rfl: 5    rosuvastatin (CRESTOR) 20 MG tablet, Take 1 tablet by mouth Daily., Disp: , Rfl:   Allergies   Allergen Reactions    Contrast Dye (Echo Or Unknown Ct/Mr) Unknown (See Comments)    Hydrocodone-Acetaminophen Unknown (See Comments)    Penicillin G Unknown (See Comments)     Social History     Socioeconomic History    Marital status:    Tobacco Use    Smoking status: Former     Passive exposure: Never    Smokeless tobacco: Never   Vaping Use    Vaping Use: Never used   Substance and Sexual Activity    Alcohol use: Not Currently    Drug use: Never    Sexual activity: Defer     Review of Systems   Constitutional: Negative for malaise/fatigue.   Cardiovascular:  Negative for chest pain, dyspnea on exertion, leg swelling and palpitations.   Respiratory:  Negative for cough and shortness of breath.    Gastrointestinal:  Negative for abdominal pain, nausea and vomiting.   Neurological:  Negative for dizziness, focal weakness, headaches, light-headedness and numbness.   All other systems reviewed and are negative.             Objective:     Constitutional:       Appearance: Well-developed.   Eyes:      General: No scleral icterus.     Conjunctiva/sclera: Conjunctivae normal.    HENT:      Head: Normocephalic and atraumatic.   Neck:      Vascular: No carotid bruit or JVD.   Pulmonary:      Effort: Pulmonary effort is normal.      Breath sounds: Normal breath sounds. No wheezing. No rales.   Cardiovascular:      Normal rate. Regular rhythm.   Pulses:     Intact distal pulses.   Abdominal:      General: Bowel sounds are normal.      Palpations: Abdomen is soft.   Musculoskeletal:      Cervical back: Normal range of motion and neck supple. Skin:     General: Skin is warm and dry.      Findings: No rash.   Neurological:      Mental Status: Alert.       Procedures    Lab Review:         MDM    #1 coronary disease  Patient had stent placement to the LAD in the past and is currently stable on medications without any angina   2.  Congestive heart failure  Patient has mild LV systolic dysfunction with an EF of 40% and is currently on metoprolol and lisinopril  3.  Hypertension  Patient blood pressure currently stable on lisinopril and metoprolol  4.  Hyperlipidemia  Patient is on Crestor and the lipids are well within normal limits  5.  Diabetes  Patient is on diet therapy only at this time.    Patient's previous medical records, labs, and EKG were reviewed and discussed with the patient at today's visit.

## 2024-01-11 NOTE — PROGRESS NOTES
"Chief Complaint  Follow-up (MEMORY LOSS)    Subjective          Goldie Mata presents to [facility]UR[facility] for MEMORY LOSS  History of Present Illness    F/U memory loss, she currently takes aricept 10 mg 1 qd.    Pt reports doing about the same, lives alone  Granddaughter works in a grocery store and buys pts grocery    Pt taking aricept 10mg daily    Mmse in jan 2017 was 17 today 20          Maximum   Score Patient's   Score Questions   5 2 \"What is the year?Season?Date?Day of the week?Month?\"   5 5 \"Where are we now: State?County?Town/city?Hospital?Floor?\"   3 3 3 Unrelated objects Number of trials:___   5 2 Count backward from 100 by sevens or spell WORLD backwards   3 0 Name 3 things from above   2 2 Identify 2 objects   1 1 Repeat the phrase: No ifs, ands,or buts.   3 3 Take paper in right hand, fold it in half, and put it on the floor.   1 1  Please read this and do what it says. \"Close your eyes\"   1 0 Make up and write a sentence about anything. Noun and verb   1 1 Copy this picture 10 angles must be present.   30 20 Total MMSE         ====PREV. OV 7/11/22====  F/U memory patient states she is about the same since last visit.   Patient is currently taking aricept 5 mg 1 qd and states medication is helping.     Pt feels her memory is stable.   No side effects from the Aricept     Reviewed mri brain, microvascular changes and atrophy as reported       ====MRI BRAIN WO CONTRAST- 1/24/22=====  IMPRESSION:  1. No acute intracranial findings.  2. Moderate bifrontal atrophy with focal left frontal lobe  encephalomalacia.  3. Moderately advanced chronic microvascular disease.               Current Outpatient Medications:     aspirin 81 MG tablet, Take 1 tablet by mouth Daily., Disp: , Rfl:     Cholecalciferol (Vitamin D3) 50 MCG (2000 UT) capsule, Take 1 capsule by mouth 2 (Two) Times a Day. Take 2 capsules daily, Disp: , Rfl:     citalopram (CeleXA) 10 MG tablet, Take 1 tablet by mouth " "Daily., Disp: , Rfl:     Cyanocobalamin 1000 MCG/ML kit, Inject  as directed Every 30 (Thirty) Days., Disp: , Rfl:     donepezil (ARICEPT) 10 MG tablet, TAKE 1 TABLET BY MOUTH EVERY EVENING, Disp: 90 tablet, Rfl: 2    lisinopril (PRINIVIL,ZESTRIL) 2.5 MG tablet, Take 1 tablet by mouth Daily., Disp: , Rfl:     metoprolol tartrate (LOPRESSOR) 50 MG tablet, TAKE HALF A TABLET BY MOUTH EVERY DAY, Disp: 45 tablet, Rfl: 1    omeprazole (priLOSEC) 40 MG capsule, Take 1 capsule by mouth Daily., Disp: , Rfl:     oxybutynin XL (DITROPAN-XL) 5 MG 24 hr tablet, Take 1 tablet by mouth Daily., Disp: , Rfl: 5    rosuvastatin (CRESTOR) 20 MG tablet, Take 1 tablet by mouth Daily., Disp: , Rfl:     Review of Systems   Respiratory:  Negative for apnea.    Neurological:  Negative for speech difficulty and headaches.   Psychiatric/Behavioral:  Positive for confusion. Negative for sleep disturbance.    All other systems reviewed and are negative.         Objective:    Vital Signs:   /75   Pulse 58   Ht 154.9 cm (60.98\")   Wt 64.9 kg (143 lb)   BMI 27.04 kg/m²     Physical Exam  Vitals reviewed.   Pulmonary:      Effort: Pulmonary effort is normal. No respiratory distress.   Neurological:      General: No focal deficit present.      Mental Status: She is alert and oriented to person, place, and time.   Psychiatric:         Mood and Affect: Mood normal.        Result Review :                Neurologic Exam     Mental Status   Oriented to person, place, and time.         Assessment and Plan    Diagnoses and all orders for this visit:    1. Memory loss (Primary)     Memory is stable, or improved   Continue aricept 10mg per day   Continue vitamin d and monthly b12        Follow Up   Return in about 1 year (around 1/15/2025).  Patient was given instructions and counseling regarding her condition or for health maintenance advice. Please see specific information pulled into the AVS if appropriate.     This document has been " electronically signed by Joseph Seipel, MD on January 15, 2024 11:23 EST

## 2024-01-15 ENCOUNTER — OFFICE VISIT (OUTPATIENT)
Dept: NEUROLOGY | Facility: CLINIC | Age: 84
End: 2024-01-15
Payer: MEDICARE

## 2024-01-15 VITALS
HEIGHT: 61 IN | SYSTOLIC BLOOD PRESSURE: 126 MMHG | WEIGHT: 143 LBS | HEART RATE: 58 BPM | DIASTOLIC BLOOD PRESSURE: 75 MMHG | BODY MASS INDEX: 27 KG/M2

## 2024-01-15 DIAGNOSIS — R41.3 MEMORY LOSS: Primary | ICD-10-CM

## 2024-01-15 PROCEDURE — 3074F SYST BP LT 130 MM HG: CPT | Performed by: PSYCHIATRY & NEUROLOGY

## 2024-01-15 PROCEDURE — 1159F MED LIST DOCD IN RCRD: CPT | Performed by: PSYCHIATRY & NEUROLOGY

## 2024-01-15 PROCEDURE — 99214 OFFICE O/P EST MOD 30 MIN: CPT | Performed by: PSYCHIATRY & NEUROLOGY

## 2024-01-15 PROCEDURE — 3078F DIAST BP <80 MM HG: CPT | Performed by: PSYCHIATRY & NEUROLOGY

## 2024-01-15 PROCEDURE — 1160F RVW MEDS BY RX/DR IN RCRD: CPT | Performed by: PSYCHIATRY & NEUROLOGY

## 2024-01-15 RX ORDER — DONEPEZIL HYDROCHLORIDE 10 MG/1
10 TABLET, FILM COATED ORAL EVERY EVENING
Qty: 90 TABLET | Refills: 3 | Status: SHIPPED | OUTPATIENT
Start: 2024-01-15

## 2024-02-26 RX ORDER — METOPROLOL TARTRATE 50 MG/1
TABLET, FILM COATED ORAL
Qty: 45 TABLET | Refills: 0 | OUTPATIENT
Start: 2024-02-26

## 2024-02-26 NOTE — TELEPHONE ENCOUNTER
Rx Refill Note  Requested Prescriptions     Signed Prescriptions Disp Refills    metoprolol tartrate (LOPRESSOR) 25 MG tablet 90 tablet 3     Sig: Take 1 tablet by mouth Daily.     Authorizing Provider: SHEYLA ZAIDI     Ordering User: JESSICA MEREDITH     Refused Prescriptions Disp Refills    metoprolol tartrate (LOPRESSOR) 50 MG tablet [Pharmacy Med Name: METOPROLOL TARTRATE 50MG] 45 tablet 0     Sig: TAKE HALF A TABLET BY MOUTH EVERY DAY     Refused By: JESSICA MEREDITH     Reason for Refusal: Other      Last office visit with prescribing clinician: 12/4/2023   Last telemedicine visit with prescribing clinician: Visit date not found   Next office visit with prescribing clinician: 6/17/2024                         Would you like a call back once the refill request has been completed: [] Yes [] No    If the office needs to give you a call back, can they leave a voicemail: [] Yes [] No    Jessica Meredith MA  02/26/24, 10:15 EST

## 2024-02-26 NOTE — TELEPHONE ENCOUNTER
Rx Refill Note  Requested Prescriptions     Pending Prescriptions Disp Refills    metoprolol tartrate (LOPRESSOR) 50 MG tablet [Pharmacy Med Name: METOPROLOL TARTRATE 50MG] 45 tablet 0     Sig: TAKE HALF A TABLET BY MOUTH EVERY DAY      Last office visit with prescribing clinician: 12/4/2023   Last telemedicine visit with prescribing clinician: Visit date not found   Next office visit with prescribing clinician: 6/17/2024   {TIP  Encounters:23}    {TIP  Please add Last Relevant Lab Date if appropriate:23}              {TIP  Is Refill Pharmacy correct?:23}    Would you like a call back once the refill request has been completed: [] Yes [] No    If the office needs to give you a call back, can they leave a voicemail: [] Yes [] No    Kirit Conklin MA  02/26/24, 09:05 EST

## 2024-02-26 NOTE — TELEPHONE ENCOUNTER
Caller: MYRA OLIVER    Relationship: Emergency Contact    Best call back number: [phone]    What is the best time to reach you: ANYTIME    Who are you requesting to speak with (clinical staff, provider,  specific staff member): CLINICAL    What was the call regarding: PT'S GRANDCHILD IS RETURNING A MISSED CALL.    Is it okay if the provider responds through MyChart: NO

## 2024-02-26 NOTE — TELEPHONE ENCOUNTER
Called patient to see if she would like to continue cutting pills in half or if she would like 25 mg tablets.

## 2024-05-13 ENCOUNTER — TELEPHONE (OUTPATIENT)
Dept: ONCOLOGY | Facility: CLINIC | Age: 84
End: 2024-05-13
Payer: MEDICARE

## 2024-05-15 NOTE — PROGRESS NOTES
Hematology/Oncology Outpatient Consultation    Patient name: Goldie Mata  : 1940  MRN: [id number]  Primary Care Physician: Olman Hatch PA-C  Referring Physician: Olman Hatch PA-C  Reason For Consult:     Chief Complaint   Patient presents with    Appointment     Malignant neoplasm of upper lobe, left bronchus or lung       History of Present Illness:    Transfer from Dr. 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  She received his carboplatinum and Taxotere for total of 6 cycles     she had radiation treatment to the chest from 2009 to 10/12/2009    2010 following left supraclavicular relapse patient received chemotherapy with cisplatin and Navelbin.  Not sure if she received XRT at that time    She developed left breast cancer back in  for which she had mastectomy followed by [facility] chemotherapy for 6 months.  There was no hormonal therapy or radiation treatment recommended    In  she developed colonic cancer.  Her last surveillance colonoscopy was in     Patient is due to have a colonoscopy in         Patient  used to smoke   She does not drink alcohol  She lives alone and able to carry out ADLs  She was exposed to chemicals      Family history of cancer with 2 sisters with ovarian and brain cancer in their 60s      Past Medical History:   Diagnosis Date    Breast cancer     CHF (congestive heart failure)     Colon cancer     Coronary artery disease     Hyperlipidemia     Hypertension     Lung cancer     Myocardial infarction        Past Surgical History:   Procedure  Laterality Date    BRONCHOSCOPY N/A 7/21/2023    Procedure: BRONCHOSCOPY WITH BRONCHOALVEOLAR LAVAGE AND ENDOBRONCHIAL ULTRASOUND WITH FINE NEEDLE ASPIRATION;  Surgeon: Carolyn Sierra MD;  Location: [facility] ENDOSCOPY;  Service: Pulmonary;  Laterality: N/A;    COLON SURGERY      CORONARY ANGIOPLASTY      greater than 5 years ago from      2023    HYSTERECTOMY      MASTECTOMY           Current Outpatient Medications:     aspirin 81 MG tablet, Take 1 tablet by mouth Daily., Disp: , Rfl:     Cholecalciferol (Vitamin D3) 50 MCG (2000 UT) capsule, Take 1 capsule by mouth 2 (Two) Times a Day. Take 2 capsules daily, Disp: , Rfl:     citalopram (CeleXA) 10 MG tablet, Take 1 tablet by mouth Daily., Disp: , Rfl:     Cyanocobalamin 1000 MCG/ML kit, Inject  as directed Every 30 (Thirty) Days., Disp: , Rfl:     donepezil (ARICEPT) 10 MG tablet, Take 1 tablet by mouth Every Evening., Disp: 90 tablet, Rfl: 3    lisinopril (PRINIVIL,ZESTRIL) 2.5 MG tablet, Take 1 tablet by mouth Daily., Disp: , Rfl:     metoprolol tartrate (LOPRESSOR) 25 MG tablet, Take 1 tablet by mouth Daily., Disp: 90 tablet, Rfl: 3    omeprazole (priLOSEC) 40 MG capsule, Take 1 capsule by mouth Daily., Disp: , Rfl:     oxybutynin XL (DITROPAN-XL) 5 MG 24 hr tablet, Take 1 tablet by mouth Daily., Disp: , Rfl: 5    rosuvastatin (CRESTOR) 20 MG tablet, Take 1 tablet by mouth Daily., Disp: , Rfl:     Allergies   Allergen Reactions    Contrast Dye (Echo Or Unknown Ct/Mr) Unknown (See Comments)    Hydrocodone-Acetaminophen Unknown (See Comments)    Penicillin G Unknown (See Comments)         There is no immunization history on file for this patient.    Family History   Problem Relation Age of Onset    Heart disease Mother     Heart disease Father     Heart disease Sister        Cancer-related family history is not on file.    Social History     Tobacco Use    Smoking status: Former     Passive exposure: Never    Smokeless tobacco: Never   Vaping Use    Vaping status:  "Never Used   Substance Use Topics    Alcohol use: Not Currently    Drug use: Never       ROS:    Review of Systems   Constitutional:  Positive for fatigue. Negative for chills and fever.   HENT:  Negative for congestion, drooling, ear discharge, rhinorrhea, sinus pressure and tinnitus.    Eyes:  Negative for photophobia, pain and discharge.   Respiratory:  Negative for apnea, choking and stridor.    Cardiovascular:  Negative for palpitations.   Gastrointestinal:  Negative for abdominal distention, abdominal pain and anal bleeding.   Endocrine: Negative for polydipsia and polyphagia.   Genitourinary:  Negative for decreased urine volume, flank pain and genital sores.   Musculoskeletal:  Negative for gait problem, neck pain and neck stiffness.   Skin:  Negative for color change, rash and wound.   Neurological:  Negative for tremors, seizures, syncope, facial asymmetry, speech difficulty and weakness.   Hematological:  Negative for adenopathy.   Psychiatric/Behavioral:  Negative for agitation, confusion, hallucinations and self-injury. The patient is not hyperactive.        Objective:    Vitals:    05/20/24 0845   BP: 119/73   Pulse: 63   Resp: 18   Temp: 98 °F (36.7 °C)   TempSrc: Infrared   SpO2: 95%   Weight: 65.8 kg (145 lb)   Height: 154.9 cm (61\")   PainSc: 0-No pain     Body mass index is 27.4 kg/m².    ECOG  (1) Restricted in physically strenuous activity, ambulatory and able to do work of light nature    Physical Exam:  Physical Exam  Vitals and nursing note reviewed.   Constitutional:       General: She is not in acute distress.     Appearance: She is not diaphoretic.   HENT:      Head: Normocephalic and atraumatic.   Eyes:      General: No scleral icterus.        Right eye: No discharge.         Left eye: No discharge.      Conjunctiva/sclera: Conjunctivae normal.   Neck:      Thyroid: No thyromegaly.   Cardiovascular:      Rate and Rhythm: Normal rate and regular rhythm.      Heart sounds: Normal heart " sounds.      No friction rub. No gallop.   Pulmonary:      Effort: Pulmonary effort is normal. No respiratory distress.      Breath sounds: No stridor. No wheezing.   Abdominal:      General: Bowel sounds are normal.      Palpations: Abdomen is soft. There is no mass.      Tenderness: There is no abdominal tenderness. There is no guarding or rebound.   Musculoskeletal:         General: No tenderness. Normal range of motion.      Cervical back: Normal range of motion and neck supple.   Lymphadenopathy:      Cervical: No cervical adenopathy.   Skin:     General: Skin is warm.      Findings: No erythema or rash.   Neurological:      Mental Status: She is alert and oriented to person, place, and time.      Motor: No abnormal muscle tone.   Psychiatric:         Behavior: Behavior normal.         RECENT LABS  WBC   Date Value Ref Range Status   05/20/2024 4.82 3.40 - 10.80 10*3/mm3 Final     RBC   Date Value Ref Range Status   05/20/2024 4.57 3.77 - 5.28 10*6/mm3 Final     Hemoglobin   Date Value Ref Range Status   05/20/2024 13.3 12.0 - 15.9 g/dL Final     Hematocrit   Date Value Ref Range Status   05/20/2024 42.1 34.0 - 46.6 % Final     MCV   Date Value Ref Range Status   05/20/2024 92.1 79.0 - 97.0 fL Final     MCH   Date Value Ref Range Status   05/20/2024 29.1 26.6 - 33.0 pg Final     MCHC   Date Value Ref Range Status   05/20/2024 31.6 31.5 - 35.7 g/dL Final     RDW   Date Value Ref Range Status   05/20/2024 14.1 12.3 - 15.4 % Final     RDW-SD   Date Value Ref Range Status   05/20/2024 46.1 37.0 - 54.0 fl Final     MPV   Date Value Ref Range Status   05/20/2024 10.0 6.0 - 12.0 fL Final     Platelets   Date Value Ref Range Status   05/20/2024 203 140 - 450 10*3/mm3 Final     Neutrophil %   Date Value Ref Range Status   05/20/2024 60.2 42.7 - 76.0 % Final     Lymphocyte %   Date Value Ref Range Status   05/20/2024 24.5 19.6 - 45.3 % Final     Monocyte %   Date Value Ref Range Status   05/20/2024 11.4 5.0 - 12.0 %  Final     Eosinophil %   Date Value Ref Range Status   05/20/2024 3.5 0.3 - 6.2 % Final     Basophil %   Date Value Ref Range Status   05/20/2024 0.4 0.0 - 1.5 % Final     Immature Grans %   Date Value Ref Range Status   11/25/2019 0.2 0.0 - 0.5 % Final     Neutrophils, Absolute   Date Value Ref Range Status   05/20/2024 2.90 1.70 - 7.00 10*3/mm3 Final     Lymphocytes, Absolute   Date Value Ref Range Status   05/20/2024 1.18 0.70 - 3.10 10*3/mm3 Final     Monocytes, Absolute   Date Value Ref Range Status   05/20/2024 0.55 0.10 - 0.90 10*3/mm3 Final     Eosinophils, Absolute   Date Value Ref Range Status   05/20/2024 0.17 0.00 - 0.40 10*3/mm3 Final     Basophils, Absolute   Date Value Ref Range Status   05/20/2024 0.02 0.00 - 0.20 10*3/mm3 Final     Immature Grans, Absolute   Date Value Ref Range Status   11/25/2019 0.01 0.00 - 0.05 10*3/mm3 Final     nRBC   Date Value Ref Range Status   07/21/2023 0.0 0.0 - 0.2 /100 WBC Final       Lab Results   Component Value Date    GLUCOSE 119 (H) 09/05/2021    BUN 15 09/05/2021    CREATININE 1.09 (H) 09/05/2021    EGFRIFNONA 48 (L) 09/05/2021    BCR 13.8 09/05/2021    K 4.0 09/05/2021    CO2 26.0 09/05/2021    CALCIUM 9.0 09/05/2021    ALBUMIN 3.5 01/07/2019    LABIL2 1.5 01/07/2019    AST 22 01/07/2019    ALT 11 (L) 01/07/2019         Assessment & Plan   Malignant neoplasm of upper lobe of left lung  - CBC & Differential      History of non small cell lung cancer stage IIIb  Breast cancer in 1989 status post left mastectomy no adjuvant chemotherapy or radiation recommended  Colon cancer status post colon resection followed by chemotherapy in her 40s: 1988  Family history of cancer as listed above   Port maintenance        Plans        [facility] to get copies of her CT scans and previous mammograms.   She has a diagnosis of breast cancer and lung cancer.   Give patient information on cancer genetics to review.   Schedule patient for unilateral right screening  mammogram,   Follow-up July 2024  Schedule patient for monthly port flushes. She will need port flush today.   Call Dr Hatch office for her recent labs.         I spent 60 total minutes, face-to-face, caring for Goldie today. 90% of this time involved counseling and/or coordination of care as documented within this note.

## 2024-05-20 ENCOUNTER — CONSULT (OUTPATIENT)
Dept: ONCOLOGY | Facility: CLINIC | Age: 84
End: 2024-05-20
Payer: MEDICARE

## 2024-05-20 ENCOUNTER — LAB (OUTPATIENT)
Dept: LAB | Facility: HOSPITAL | Age: 84
End: 2024-05-20
Payer: MEDICARE

## 2024-05-20 ENCOUNTER — HOSPITAL ENCOUNTER (OUTPATIENT)
Dept: ONCOLOGY | Facility: HOSPITAL | Age: 84
Discharge: HOME | End: 2024-05-20
Admitting: INTERNAL MEDICINE
Payer: MEDICARE

## 2024-05-20 VITALS
DIASTOLIC BLOOD PRESSURE: 73 MMHG | HEIGHT: 61 IN | HEART RATE: 63 BPM | SYSTOLIC BLOOD PRESSURE: 119 MMHG | OXYGEN SATURATION: 95 % | WEIGHT: 145 LBS | RESPIRATION RATE: 18 BRPM | BODY MASS INDEX: 27.38 KG/M2 | TEMPERATURE: 98 F

## 2024-05-20 DIAGNOSIS — Z45.2 ENCOUNTER FOR CARE RELATED TO PORT-A-CATH: Primary | ICD-10-CM

## 2024-05-20 DIAGNOSIS — C34.90 MALIGNANT NEOPLASM OF LUNG, UNSPECIFIED LATERALITY, UNSPECIFIED PART OF LUNG: ICD-10-CM

## 2024-05-20 DIAGNOSIS — C34.12 MALIGNANT NEOPLASM OF UPPER LOBE OF LEFT LUNG: Primary | ICD-10-CM

## 2024-05-20 DIAGNOSIS — Z12.31 ENCOUNTER FOR SCREENING MAMMOGRAM FOR MALIGNANT NEOPLASM OF BREAST: ICD-10-CM

## 2024-05-20 DIAGNOSIS — R59.0 HILAR LYMPHADENOPATHY: ICD-10-CM

## 2024-05-20 DIAGNOSIS — R91.1 LUNG NODULE: ICD-10-CM

## 2024-05-20 LAB
BASOPHILS # BLD AUTO: 0.02 10*3/MM3 (ref 0–0.2)
BASOPHILS NFR BLD AUTO: 0.4 % (ref 0–1.5)
DEPRECATED RDW RBC AUTO: 46.1 FL (ref 37–54)
EOSINOPHIL # BLD AUTO: 0.17 10*3/MM3 (ref 0–0.4)
EOSINOPHIL NFR BLD AUTO: 3.5 % (ref 0.3–6.2)
ERYTHROCYTE [DISTWIDTH] IN BLOOD BY AUTOMATED COUNT: 14.1 % (ref 12.3–15.4)
HCT VFR BLD AUTO: 42.1 % (ref 34–46.6)
HGB BLD-MCNC: 13.3 G/DL (ref 12–15.9)
HOLD SPECIMEN: NORMAL
HOLD SPECIMEN: NORMAL
LYMPHOCYTES # BLD AUTO: 1.18 10*3/MM3 (ref 0.7–3.1)
LYMPHOCYTES NFR BLD AUTO: 24.5 % (ref 19.6–45.3)
MCH RBC QN AUTO: 29.1 PG (ref 26.6–33)
MCHC RBC AUTO-ENTMCNC: 31.6 G/DL (ref 31.5–35.7)
MCV RBC AUTO: 92.1 FL (ref 79–97)
MONOCYTES # BLD AUTO: 0.55 10*3/MM3 (ref 0.1–0.9)
MONOCYTES NFR BLD AUTO: 11.4 % (ref 5–12)
NEUTROPHILS NFR BLD AUTO: 2.9 10*3/MM3 (ref 1.7–7)
NEUTROPHILS NFR BLD AUTO: 60.2 % (ref 42.7–76)
PLATELET # BLD AUTO: 203 10*3/MM3 (ref 140–450)
PMV BLD AUTO: 10 FL (ref 6–12)
RBC # BLD AUTO: 4.57 10*6/MM3 (ref 3.77–5.28)
WBC NRBC COR # BLD AUTO: 4.82 10*3/MM3 (ref 3.4–10.8)

## 2024-05-20 PROCEDURE — 85025 COMPLETE CBC W/AUTO DIFF WBC: CPT

## 2024-05-20 PROCEDURE — G0463 HOSPITAL OUTPT CLINIC VISIT: HCPCS

## 2024-05-20 PROCEDURE — 25010000002 HEPARIN LOCK FLUSH PER 10 UNITS: Performed by: INTERNAL MEDICINE

## 2024-05-20 PROCEDURE — 36415 COLL VENOUS BLD VENIPUNCTURE: CPT

## 2024-05-20 RX ORDER — HEPARIN SODIUM (PORCINE) LOCK FLUSH IV SOLN 100 UNIT/ML 100 UNIT/ML
500 SOLUTION INTRAVENOUS AS NEEDED
Status: DISCONTINUED | OUTPATIENT
Start: 2024-05-20 | End: 2024-05-21 | Stop reason: HOSPADM

## 2024-05-20 RX ORDER — SODIUM CHLORIDE 0.9 % (FLUSH) 0.9 %
20 SYRINGE (ML) INJECTION AS NEEDED
Status: DISCONTINUED | OUTPATIENT
Start: 2024-05-20 | End: 2024-05-21 | Stop reason: HOSPADM

## 2024-05-20 RX ADMIN — Medication 20 ML: at 11:03

## 2024-05-20 RX ADMIN — HEPARIN 500 UNITS: 100 SYRINGE at 11:05

## 2024-05-20 NOTE — PROGRESS NOTES
Patient was informed to return later today for a port flush at the end of her M.D. visit today. Patients labs were obtained prior to M.D. this A.M. Patient was aware of next appointments.

## 2024-05-21 ENCOUNTER — DOCUMENTATION (OUTPATIENT)
Dept: ONCOLOGY | Facility: CLINIC | Age: 84
End: 2024-05-21
Payer: MEDICARE

## 2024-05-21 ENCOUNTER — PATIENT ROUNDING (BHMG ONLY) (OUTPATIENT)
Dept: ONCOLOGY | Facility: CLINIC | Age: 84
End: 2024-05-21
Payer: MEDICARE

## 2024-05-21 NOTE — PROGRESS NOTES
May 21, 2024    Hello, may I speak with Goldie Mata?    My name is Elaine Ross      I am  with [facility]  [address] [place] [address] [phone].    Before we get started may I verify your date of birth? 1940    I am calling to officially welcome you to our practice and ask about your recent visit. Is this a good time to talk? no    Tell me about your visit with us. What things went well?  A My Chart message was sent to the patient.         We're always looking for ways to make our patients' experiences even better. Do you have recommendations on ways we may improve?  no    Overall were you satisfied with your first visit to our practice? yes       I appreciate you taking the time to speak with me today. Is there anything else I can do for you? no      Thank you, and have a great day.

## 2024-05-21 NOTE — PROGRESS NOTES
Call placed to [facility] and Dr. Hatch's office to request scan results and recent lab results.

## 2024-06-03 DIAGNOSIS — R59.0 HILAR LYMPHADENOPATHY: ICD-10-CM

## 2024-06-03 DIAGNOSIS — C34.12 MALIGNANT NEOPLASM OF UPPER LOBE OF LEFT LUNG: ICD-10-CM

## 2024-06-03 DIAGNOSIS — R91.1 LUNG NODULE: Primary | ICD-10-CM

## 2024-06-17 ENCOUNTER — HOSPITAL ENCOUNTER (OUTPATIENT)
Dept: PET IMAGING | Facility: HOSPITAL | Age: 84
Discharge: HOME | End: 2024-06-17
Admitting: INTERNAL MEDICINE
Payer: MEDICARE

## 2024-06-17 ENCOUNTER — HOSPITAL ENCOUNTER (OUTPATIENT)
Dept: ONCOLOGY | Facility: HOSPITAL | Age: 84
Discharge: HOME | End: 2024-06-17
Admitting: INTERNAL MEDICINE
Payer: MEDICARE

## 2024-06-17 ENCOUNTER — OFFICE VISIT (OUTPATIENT)
Dept: CARDIOLOGY | Facility: CLINIC | Age: 84
End: 2024-06-17
Payer: MEDICARE

## 2024-06-17 VITALS
HEART RATE: 92 BPM | DIASTOLIC BLOOD PRESSURE: 80 MMHG | BODY MASS INDEX: 28.13 KG/M2 | OXYGEN SATURATION: 97 % | SYSTOLIC BLOOD PRESSURE: 124 MMHG | HEIGHT: 61 IN | WEIGHT: 149 LBS

## 2024-06-17 DIAGNOSIS — I10 ESSENTIAL HYPERTENSION: ICD-10-CM

## 2024-06-17 DIAGNOSIS — Z45.2 ENCOUNTER FOR CARE RELATED TO PORT-A-CATH: Primary | ICD-10-CM

## 2024-06-17 DIAGNOSIS — R91.1 LUNG NODULE: ICD-10-CM

## 2024-06-17 DIAGNOSIS — I25.118 CORONARY ARTERY DISEASE OF NATIVE ARTERY OF NATIVE HEART WITH STABLE ANGINA PECTORIS: Primary | ICD-10-CM

## 2024-06-17 DIAGNOSIS — E11.9 TYPE 2 DIABETES MELLITUS WITHOUT COMPLICATION, WITHOUT LONG-TERM CURRENT USE OF INSULIN: ICD-10-CM

## 2024-06-17 DIAGNOSIS — E78.00 PURE HYPERCHOLESTEROLEMIA: ICD-10-CM

## 2024-06-17 DIAGNOSIS — I50.22 CHRONIC SYSTOLIC CONGESTIVE HEART FAILURE: ICD-10-CM

## 2024-06-17 DIAGNOSIS — R59.0 HILAR LYMPHADENOPATHY: ICD-10-CM

## 2024-06-17 DIAGNOSIS — C34.12 MALIGNANT NEOPLASM OF UPPER LOBE OF LEFT LUNG: ICD-10-CM

## 2024-06-17 LAB — GLUCOSE BLDC GLUCOMTR-MCNC: 103 MG/DL (ref 70–105)

## 2024-06-17 PROCEDURE — 3079F DIAST BP 80-89 MM HG: CPT | Performed by: INTERNAL MEDICINE

## 2024-06-17 PROCEDURE — 25010000002 HEPARIN LOCK FLUSH PER 10 UNITS: Performed by: INTERNAL MEDICINE

## 2024-06-17 PROCEDURE — 96523 IRRIG DRUG DELIVERY DEVICE: CPT

## 2024-06-17 PROCEDURE — 99214 OFFICE O/P EST MOD 30 MIN: CPT | Performed by: INTERNAL MEDICINE

## 2024-06-17 PROCEDURE — 78815 PET IMAGE W/CT SKULL-THIGH: CPT

## 2024-06-17 PROCEDURE — 1160F RVW MEDS BY RX/DR IN RCRD: CPT | Performed by: INTERNAL MEDICINE

## 2024-06-17 PROCEDURE — 3074F SYST BP LT 130 MM HG: CPT | Performed by: INTERNAL MEDICINE

## 2024-06-17 PROCEDURE — A9552 F18 FDG: HCPCS | Performed by: INTERNAL MEDICINE

## 2024-06-17 PROCEDURE — 1159F MED LIST DOCD IN RCRD: CPT | Performed by: INTERNAL MEDICINE

## 2024-06-17 PROCEDURE — 82948 REAGENT STRIP/BLOOD GLUCOSE: CPT

## 2024-06-17 PROCEDURE — 0 FLUDEOXYGLUCOSE F18 SOLUTION: Performed by: INTERNAL MEDICINE

## 2024-06-17 RX ORDER — HEPARIN SODIUM (PORCINE) LOCK FLUSH IV SOLN 100 UNIT/ML 100 UNIT/ML
500 SOLUTION INTRAVENOUS AS NEEDED
Status: DISCONTINUED | OUTPATIENT
Start: 2024-06-17 | End: 2024-06-18 | Stop reason: HOSPADM

## 2024-06-17 RX ORDER — SODIUM CHLORIDE 0.9 % (FLUSH) 0.9 %
20 SYRINGE (ML) INJECTION AS NEEDED
Status: DISCONTINUED | OUTPATIENT
Start: 2024-06-17 | End: 2024-06-18 | Stop reason: HOSPADM

## 2024-06-17 RX ADMIN — FLUDEOXYGLUCOSE F 18 1 DOSE: 200 INJECTION, SOLUTION INTRAVENOUS at 10:37

## 2024-06-17 RX ADMIN — HEPARIN 500 UNITS: 100 SYRINGE at 10:15

## 2024-06-17 RX ADMIN — Medication 20 ML: at 10:13

## 2024-06-17 NOTE — PROGRESS NOTES
"    Subjective:     Encounter Date:06/17/2024      Patient ID: Godlie Mata is a 83 y.o. female.    Chief Complaint:  History of Present Illness 83-year-old white female with history of coronary disease history of HFrEF hypertension hyperlipidemia and diabetes presents to the office for a follow-up.  Patient is currently stable without any symptoms of chest pain or shortness of breath at rest or exertion.  No complaint of any PND orthopnea.  No palpitation dizziness syncope or swelling of the feet.  Patient is taking all the medicines regular.  Patient does not smoke    The following portions of the patient's history were reviewed and updated as appropriate: allergies, current medications, past family history, past medical history, past social history, past surgical history, and problem list.  Past Medical History:   Diagnosis Date    Breast cancer     CHF (congestive heart failure)     Colon cancer     Coronary artery disease     Hyperlipidemia     Hypertension     Lung cancer     Myocardial infarction      Past Surgical History:   Procedure Laterality Date    BRONCHOSCOPY N/A 7/21/2023    Procedure: BRONCHOSCOPY WITH BRONCHOALVEOLAR LAVAGE AND ENDOBRONCHIAL ULTRASOUND WITH FINE NEEDLE ASPIRATION;  Surgeon: Carolyn Sierra MD;  Location: [facility] ENDOSCOPY;  Service: Pulmonary;  Laterality: N/A;    COLON SURGERY      CORONARY ANGIOPLASTY      greater than 5 years ago from      2023    HYSTERECTOMY      MASTECTOMY       /80   Pulse 92   Ht 154.9 cm (60.98\")   Wt 67.6 kg (149 lb)   SpO2 97%   BMI 28.17 kg/m²   Family History   Problem Relation Age of Onset    Heart disease Mother     Heart disease Father     Heart disease Sister        Current Outpatient Medications:     aspirin 81 MG tablet, Take 1 tablet by mouth Daily., Disp: , Rfl:     Cholecalciferol (Vitamin D3) 50 MCG (2000 UT) capsule, Take 1 capsule by mouth 2 (Two) Times a Day. Take 2 capsules daily, Disp: , Rfl:     citalopram (CeleXA) 10 MG " tablet, Take 1 tablet by mouth Daily., Disp: , Rfl:     Cyanocobalamin 1000 MCG/ML kit, Inject  as directed Every 30 (Thirty) Days., Disp: , Rfl:     donepezil (ARICEPT) 10 MG tablet, Take 1 tablet by mouth Every Evening., Disp: 90 tablet, Rfl: 3    lisinopril (PRINIVIL,ZESTRIL) 2.5 MG tablet, Take 1 tablet by mouth Daily., Disp: , Rfl:     metoprolol tartrate (LOPRESSOR) 25 MG tablet, Take 1 tablet by mouth Daily., Disp: 90 tablet, Rfl: 3    omeprazole (priLOSEC) 40 MG capsule, Take 1 capsule by mouth Daily., Disp: , Rfl:     oxybutynin XL (DITROPAN-XL) 5 MG 24 hr tablet, Take 1 tablet by mouth Daily., Disp: , Rfl: 5    rosuvastatin (CRESTOR) 20 MG tablet, Take 1 tablet by mouth Daily., Disp: , Rfl:   No current facility-administered medications for this visit.    Facility-Administered Medications Ordered in Other Visits:     heparin injection 500 Units, 500 Units, Intravenous, PRN, Emelyn Morrell MD, 500 Units at 06/17/24 1015    sodium chloride 0.9 % flush 20 mL, 20 mL, Intravenous, PRN, Emelyn Morrell MD, 20 mL at 06/17/24 1013  Allergies   Allergen Reactions    Contrast Dye (Echo Or Unknown Ct/Mr) Unknown (See Comments)    Hydrocodone-Acetaminophen Unknown (See Comments)    Penicillin G Unknown (See Comments)     Social History     Socioeconomic History    Marital status:    Tobacco Use    Smoking status: Former     Passive exposure: Never    Smokeless tobacco: Never   Vaping Use    Vaping status: Never Used   Substance and Sexual Activity    Alcohol use: Not Currently    Drug use: Never    Sexual activity: Defer     Review of Systems   Constitutional: Negative for malaise/fatigue.   Cardiovascular:  Negative for chest pain, dyspnea on exertion, leg swelling and palpitations.   Respiratory:  Negative for cough and shortness of breath.    Gastrointestinal:  Negative for abdominal pain, nausea and vomiting.   Neurological:  Negative for dizziness, focal weakness, headaches,  light-headedness and numbness.   All other systems reviewed and are negative.             Objective:     Constitutional:       Appearance: Well-developed.   Eyes:      General: No scleral icterus.     Conjunctiva/sclera: Conjunctivae normal.   HENT:      Head: Normocephalic and atraumatic.   Neck:      Vascular: No carotid bruit or JVD.   Pulmonary:      Effort: Pulmonary effort is normal.      Breath sounds: Normal breath sounds. No wheezing. No rales.   Cardiovascular:      Normal rate. Regular rhythm.   Pulses:     Intact distal pulses.   Abdominal:      General: Bowel sounds are normal.      Palpations: Abdomen is soft.   Musculoskeletal:      Cervical back: Normal range of motion and neck supple. Skin:     General: Skin is warm and dry.      Findings: No rash.   Neurological:      Mental Status: Alert.       Procedures    Lab Review:         MDM    #1 coronary artery disease  Patient has stent placement to the RCA in the past and is currently stable on medications  2.  HFrEF  Patient has history of congestive heart with LV systolic dysfunction is currently stable on medications including metoprolol and lisinopril  3.  Hypertension  Patient blood pressure currently stable on metoprolol and lisinopril  4.  Hyperlipidemia  Patient is on Crestor and the lipid levels are well within normal limits  5.  Diabetes  Patient is only on diet therapy for now    Patient's previous medical records, labs, and EKG were reviewed and discussed with the patient at today's visit.

## 2024-06-24 ENCOUNTER — PATIENT OUTREACH (OUTPATIENT)
Dept: ONCOLOGY | Facility: CLINIC | Age: 84
End: 2024-06-24
Payer: MEDICARE

## 2024-06-24 DIAGNOSIS — I71.40 ABDOMINAL ANEURYSM: Primary | ICD-10-CM

## 2024-06-24 NOTE — SIGNIFICANT NOTE
Referral from Dr. Morrell for Dr. Camacho. Patient offered 6/25, but unable to make it. Scheduled for 7/2 at 945. Spoke with Granddaughter and she will bring her. Aware of location, date and time. She has my direct number for any questions or concerns.

## 2024-06-28 ENCOUNTER — HOSPITAL ENCOUNTER (OUTPATIENT)
Dept: MAMMOGRAPHY | Facility: HOSPITAL | Age: 84
Discharge: HOME | End: 2024-06-28
Admitting: INTERNAL MEDICINE
Payer: MEDICARE

## 2024-06-28 DIAGNOSIS — Z12.31 ENCOUNTER FOR SCREENING MAMMOGRAM FOR MALIGNANT NEOPLASM OF BREAST: ICD-10-CM

## 2024-06-28 PROCEDURE — 77067 SCR MAMMO BI INCL CAD: CPT

## 2024-06-28 PROCEDURE — 77063 BREAST TOMOSYNTHESIS BI: CPT

## 2024-07-02 ENCOUNTER — OFFICE VISIT (OUTPATIENT)
Dept: SURGERY | Facility: CLINIC | Age: 84
End: 2024-07-02
Payer: MEDICARE

## 2024-07-02 ENCOUNTER — PATIENT OUTREACH (OUTPATIENT)
Dept: ONCOLOGY | Facility: CLINIC | Age: 84
End: 2024-07-02
Payer: MEDICARE

## 2024-07-02 VITALS
HEIGHT: 60 IN | OXYGEN SATURATION: 94 % | SYSTOLIC BLOOD PRESSURE: 121 MMHG | HEART RATE: 55 BPM | BODY MASS INDEX: 30.04 KG/M2 | DIASTOLIC BLOOD PRESSURE: 65 MMHG | WEIGHT: 153 LBS

## 2024-07-02 DIAGNOSIS — R91.1 LEFT LOWER LOBE PULMONARY NODULE: Primary | ICD-10-CM

## 2024-07-02 RX ORDER — DIPHENHYDRAMINE HCL 25 MG
TABLET ORAL
COMMUNITY

## 2024-07-02 NOTE — SIGNIFICANT NOTE
I accompanied patient, daughter and granddaughter to Dr. Camacho's office visit.     Dr. Camacho reviewed medical history, scan images and plan. Dr. Camacho will discus with Dr. Morrell thoughts of empiric SBRT. Patient seeing Dr. Morrell 7/3

## 2024-07-03 ENCOUNTER — LAB (OUTPATIENT)
Dept: LAB | Facility: HOSPITAL | Age: 84
End: 2024-07-03
Payer: MEDICARE

## 2024-07-03 ENCOUNTER — OFFICE VISIT (OUTPATIENT)
Dept: ONCOLOGY | Facility: CLINIC | Age: 84
End: 2024-07-03
Payer: MEDICARE

## 2024-07-03 VITALS
DIASTOLIC BLOOD PRESSURE: 67 MMHG | TEMPERATURE: 98 F | SYSTOLIC BLOOD PRESSURE: 106 MMHG | RESPIRATION RATE: 18 BRPM | WEIGHT: 151 LBS | HEIGHT: 60 IN | OXYGEN SATURATION: 96 % | BODY MASS INDEX: 29.64 KG/M2 | HEART RATE: 64 BPM

## 2024-07-03 DIAGNOSIS — C34.12 MALIGNANT NEOPLASM OF UPPER LOBE OF LEFT LUNG: Primary | ICD-10-CM

## 2024-07-03 LAB
BASOPHILS # BLD AUTO: 0.04 10*3/MM3 (ref 0–0.2)
BASOPHILS NFR BLD AUTO: 0.5 % (ref 0–1.5)
DEPRECATED RDW RBC AUTO: 47.6 FL (ref 37–54)
EOSINOPHIL # BLD AUTO: 0.19 10*3/MM3 (ref 0–0.4)
EOSINOPHIL NFR BLD AUTO: 2.6 % (ref 0.3–6.2)
ERYTHROCYTE [DISTWIDTH] IN BLOOD BY AUTOMATED COUNT: 14.2 % (ref 12.3–15.4)
HCT VFR BLD AUTO: 43.3 % (ref 34–46.6)
HGB BLD-MCNC: 13.6 G/DL (ref 12–15.9)
LYMPHOCYTES # BLD AUTO: 1.08 10*3/MM3 (ref 0.7–3.1)
LYMPHOCYTES NFR BLD AUTO: 14.7 % (ref 19.6–45.3)
MCH RBC QN AUTO: 29.6 PG (ref 26.6–33)
MCHC RBC AUTO-ENTMCNC: 31.4 G/DL (ref 31.5–35.7)
MCV RBC AUTO: 94.1 FL (ref 79–97)
MONOCYTES # BLD AUTO: 0.6 10*3/MM3 (ref 0.1–0.9)
MONOCYTES NFR BLD AUTO: 8.1 % (ref 5–12)
NEUTROPHILS NFR BLD AUTO: 5.46 10*3/MM3 (ref 1.7–7)
NEUTROPHILS NFR BLD AUTO: 74.1 % (ref 42.7–76)
PLATELET # BLD AUTO: 183 10*3/MM3 (ref 140–450)
PMV BLD AUTO: 9.8 FL (ref 6–12)
RBC # BLD AUTO: 4.6 10*6/MM3 (ref 3.77–5.28)
WBC NRBC COR # BLD AUTO: 7.37 10*3/MM3 (ref 3.4–10.8)

## 2024-07-03 PROCEDURE — 36415 COLL VENOUS BLD VENIPUNCTURE: CPT

## 2024-07-03 PROCEDURE — 85025 COMPLETE CBC W/AUTO DIFF WBC: CPT

## 2024-07-03 RX ORDER — LEVOFLOXACIN 500 MG/1
500 TABLET, FILM COATED ORAL DAILY
Qty: 10 TABLET | Refills: 0 | Status: SHIPPED | OUTPATIENT
Start: 2024-07-03 | End: 2024-07-13

## 2024-07-03 NOTE — PROGRESS NOTES
Hematology/Oncology Outpatient Follow Up    PATIENT NAME:Goldie Mata  :1940  MRN: [id number]  PRIMARY CARE PHYSICIAN: Olman Hatch PA-C  REFERRING PHYSICIAN: No ref. provider found    Chief Complaint   Patient presents with    Follow-up     Malignant neoplasm of upper lobe of left lung        HISTORY OF PRESENT ILLNESS:   Transfer from Dr. 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  She received his carboplatinum and Taxotere for total of 6 cycles      she had radiation treatment to the chest from 2009 to 10/12/2009     2010 following left supraclavicular relapse patient received chemotherapy with cisplatin and Navelbin.  Not sure if she received XRT at that time     She developed left breast cancer back in  for which she had mastectomy followed by [facility] chemotherapy for 6 months.  There was no hormonal therapy or radiation treatment recommended     In  she developed colonic cancer.  Her last surveillance colonoscopy was in 2019     Patient is due to have a colonoscopy in            Patient  used to smoke   She does not drink alcohol  She lives alone and able to carry out ADLs  She was exposed to chemicals        Family history of cancer with 2 sisters with ovarian and brain cancer in their 60s    May 6, 2024 patient had CT scan of the chest which basically showed slightly larger now completely solid segment left lower lobe pulmonary nodule stable at the 7 smaller nodule.  2024 patient had a PET CT scan to further evaluate the enlarging left lower lobe nodule.  This showed  increased metabolic activity in the 1.4 cm nodule located in the superior segment of the left lower lobe SUV 4.5 cc concerning for malignancy and biopsy has been recommended.  There is a small cavitary lesion in the posterior basilar segments of the left lower lobe measuring 1 cm may be scar tissue atelectasis or malignant.  SUV was 3.64 infrarenal abdominal aortic aneurysm measuring 3.8 cm slightly increased from previous PET scan where was 3.5 cm  Patient was referred to Dr. Camacho.  She is declining biopsy of the left lung nodule  Past Medical History:   Diagnosis Date    Breast cancer     CHF (congestive heart failure)     Colon cancer     Coronary artery disease     Hyperlipidemia     Hypertension     Lung cancer     Myocardial infarction        Past Surgical History:   Procedure Laterality Date    BRONCHOSCOPY N/A 07/21/2023    Procedure: BRONCHOSCOPY WITH BRONCHOALVEOLAR LAVAGE AND ENDOBRONCHIAL ULTRASOUND WITH FINE NEEDLE ASPIRATION;  Surgeon: Carolyn Sierra MD;  Location: [facility] ENDOSCOPY;  Service: Pulmonary;  Laterality: N/A;    COLON SURGERY      CORONARY ANGIOPLASTY      greater than 5 years ago from      2023    HYSTERECTOMY      MASTECTOMY           Current Outpatient Medications:     aspirin 81 MG tablet, Take 1 tablet by mouth Daily., Disp: , Rfl:     Calcium Carbonate Antacid (Antacid Soft Chews) 1177 MG chewable tablet, Chew., Disp: , Rfl:     Cholecalciferol (Vitamin D3) 50 MCG (2000 UT) capsule, Take 1 capsule by mouth 2 (Two) Times a Day. Take 2 capsules daily, Disp: , Rfl:     citalopram (CeleXA) 10 MG tablet, Take 1 tablet by mouth Daily., Disp: , Rfl:     Cyanocobalamin 1000 MCG/ML kit, Inject  as directed Every 30 (Thirty) Days., Disp: , Rfl:     donepezil (ARICEPT) 10 MG tablet, Take 1 tablet by mouth Every Evening., Disp: 90 tablet, Rfl: 3    levoFLOXacin (Levaquin) 500 MG tablet, Take 1 tablet by mouth Daily for 10 days., Disp: 10 tablet, Rfl: 0    lisinopril (PRINIVIL,ZESTRIL) 2.5 MG  "tablet, Take 1 tablet by mouth Daily., Disp: , Rfl:     metoprolol tartrate (LOPRESSOR) 25 MG tablet, Take 1 tablet by mouth Daily., Disp: 90 tablet, Rfl: 3    omeprazole (priLOSEC) 40 MG capsule, Take 1 capsule by mouth Daily., Disp: , Rfl:     oxybutynin XL (DITROPAN-XL) 5 MG 24 hr tablet, Take 1 tablet by mouth Daily., Disp: , Rfl: 5    rosuvastatin (CRESTOR) 20 MG tablet, Take 1 tablet by mouth Daily., Disp: , Rfl:     Allergies   Allergen Reactions    Contrast Dye (Echo Or Unknown Ct/Mr) Unknown (See Comments)    Hydrocodone-Acetaminophen Unknown (See Comments)    Penicillin G Unknown (See Comments)       Family History   Problem Relation Age of Onset    Heart disease Mother     Heart disease Father     Colon cancer Sister     Heart disease Sister        Cancer-related family history includes Colon cancer in her sister.    Social History     Tobacco Use    Smoking status: Former     Passive exposure: Never    Smokeless tobacco: Never   Vaping Use    Vaping status: Never Used   Substance Use Topics    Alcohol use: Not Currently    Drug use: Never       I have reviewed and confirmed the accuracy of the patient's history: Chief complaint, HPI, ROS, and Subjective as entered by the MA/LPN/RN. Emelynmagalis Morrell MD 07/03/24      SUBJECTIVE:     She is here today accompanied by family members for this appointment    REVIEW OF SYSTEMS:  Review of Systems    Negative    OBJECTIVE:    Vitals:    07/03/24 1303   BP: 106/67   Pulse: 64   Resp: 18   Temp: 98 °F (36.7 °C)   TempSrc: Infrared   SpO2: 96%   Weight: 68.5 kg (151 lb)   Height: 152.4 cm (60\")   PainSc: 0-No pain     Body mass index is 29.49 kg/m².    ECOG  (1) Restricted in physically strenuous activity, ambulatory and able to do work of light nature    Physical Exam  No changes  RECENT LABS  WBC   Date Value Ref Range Status   07/03/2024 7.37 3.40 - 10.80 10*3/mm3 Final     RBC   Date Value Ref Range Status   07/03/2024 4.60 3.77 - 5.28 10*6/mm3 Final "     Hemoglobin   Date Value Ref Range Status   07/03/2024 13.6 12.0 - 15.9 g/dL Final     Hematocrit   Date Value Ref Range Status   07/03/2024 43.3 34.0 - 46.6 % Final     MCV   Date Value Ref Range Status   07/03/2024 94.1 79.0 - 97.0 fL Final     MCH   Date Value Ref Range Status   07/03/2024 29.6 26.6 - 33.0 pg Final     MCHC   Date Value Ref Range Status   07/03/2024 31.4 (L) 31.5 - 35.7 g/dL Final     RDW   Date Value Ref Range Status   07/03/2024 14.2 12.3 - 15.4 % Final     RDW-SD   Date Value Ref Range Status   07/03/2024 47.6 37.0 - 54.0 fl Final     MPV   Date Value Ref Range Status   07/03/2024 9.8 6.0 - 12.0 fL Final     Platelets   Date Value Ref Range Status   07/03/2024 183 140 - 450 10*3/mm3 Final     Neutrophil %   Date Value Ref Range Status   07/03/2024 74.1 42.7 - 76.0 % Final     Lymphocyte %   Date Value Ref Range Status   07/03/2024 14.7 (L) 19.6 - 45.3 % Final     Monocyte %   Date Value Ref Range Status   07/03/2024 8.1 5.0 - 12.0 % Final     Eosinophil %   Date Value Ref Range Status   07/03/2024 2.6 0.3 - 6.2 % Final     Basophil %   Date Value Ref Range Status   07/03/2024 0.5 0.0 - 1.5 % Final     Immature Grans %   Date Value Ref Range Status   11/25/2019 0.2 0.0 - 0.5 % Final     Neutrophils, Absolute   Date Value Ref Range Status   07/03/2024 5.46 1.70 - 7.00 10*3/mm3 Final     Lymphocytes, Absolute   Date Value Ref Range Status   07/03/2024 1.08 0.70 - 3.10 10*3/mm3 Final     Monocytes, Absolute   Date Value Ref Range Status   07/03/2024 0.60 0.10 - 0.90 10*3/mm3 Final     Eosinophils, Absolute   Date Value Ref Range Status   07/03/2024 0.19 0.00 - 0.40 10*3/mm3 Final     Basophils, Absolute   Date Value Ref Range Status   07/03/2024 0.04 0.00 - 0.20 10*3/mm3 Final     Immature Grans, Absolute   Date Value Ref Range Status   11/25/2019 0.01 0.00 - 0.05 10*3/mm3 Final     nRBC   Date Value Ref Range Status   07/21/2023 0.0 0.0 - 0.2 /100 WBC Final       Lab Results   Component Value  Date    GLUCOSE 119 (H) 09/05/2021    BUN 15 09/05/2021    CREATININE 1.09 (H) 09/05/2021    EGFRIFNONA 48 (L) 09/05/2021    BCR 13.8 09/05/2021    K 4.0 09/05/2021    CO2 26.0 09/05/2021    CALCIUM 9.0 09/05/2021    ALBUMIN 3.5 01/07/2019    LABIL2 1.5 01/07/2019    AST 22 01/07/2019    ALT 11 (L) 01/07/2019         Assessment & Plan     Malignant neoplasm of upper lobe of left lung  - CBC & Differential  - Ambulatory Referral to Radiation Oncology      ASSESSMENT:    Malignant neoplasm of upper lobe of left lung  - CBC & Differential        Enlarging left lower lobe pulmonary nodule measuring 1.4 cm, PET avid suspicious for malignancy.  Small cavitary lesion posterior basilar segment of the left lower lobe 1 cm may be malignant inflammatory or infection  Patient declines biopsy of the nodule  SRS is being considered: Will refer to Dr. Robles to weigh in  Enlarging aneurysm: Refer to Dr. Andrade  History of non small cell lung cancer stage IIIb approximately 2009  Breast cancer in 1989 status post left mastectomy no adjuvant chemotherapy or radiation recommended  Colon cancer status post colon resection followed by chemotherapy in her 40s: 1988  Family history of cancer as listed above   Port maintenance           Plans           Refer to Dr. Robles for evaluation for possible SRS to the left lung nodule which is suspicious    Follow-up in 6 weeks    If XR is not offered, close surveillance CT scans      Give patient information on cancer genetics to review.     Schedule patient for unilateral right screening mammogram which was completed 6/28/2024 normal    Continue monthly port flushes             I spent 40 total minutes, face-to-face, caring for Goldie today. 90% of this time involved counseling and/or coordination of care as documented within this note.

## 2024-07-05 ENCOUNTER — PATIENT ROUNDING (BHMG ONLY) (OUTPATIENT)
Dept: SURGERY | Facility: CLINIC | Age: 84
End: 2024-07-05
Payer: MEDICARE

## 2024-07-05 NOTE — PROGRESS NOTES
July 5, 2024    Hello, may I speak with Goldie Mata?    My name is Debbie      I am  with [facility]  [address] [place] [address] [phone].    Before we get started may I verify your date of birth? 1940    I am calling to officially welcome you to our practice and ask about your recent visit. Is this a good time to talk? yes    Tell me about your visit with us. What things went well?  Everything, I have no complaints       We're always looking for ways to make our patients' experiences even better. Do you have recommendations on ways we may improve?  no    Overall were you satisfied with your first visit to our practice? yes       I appreciate you taking the time to speak with me today. Is there anything else I can do for you? no      Thank you, and have a great day.

## 2024-07-07 NOTE — PROGRESS NOTES
THORACIC SURGERY CLINIC CONSULT NOTE    REASON FOR CONSULT: Left lower lobe enlarging PET avid nodule    REFERRING PROVIDER: Dr. Morrell    Subjective   HISTORY OF PRESENTING ILLNESS:   Goldie Maat is a 83 y.o. 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    She was diagnosed with non-small cell carcinoma of the lung established in 2009 T2 N3 M0 stage IIIb with left supraclavicular lymph node recurrence in June 2010.  She received his carboplatinum and Taxotere for total of 6 cycles, in 2009, she had radiation treatment to the chest from 8/31/2009 to 10/12/2009.  In 2010, she had left supraclavicular recurrence and she received chemotherapy with cisplatin and Navelbin.     On 6/17/2024, she had a PET CT scan to further evaluate an enlarging left lower lobe nodule. This showed increased metabolic activity in the 1.4 cm nodule located in the superior segment of the left lower lobe SUV 4.5 concerning for malignancy and biopsy was recommended. There was a small cavitary lesion in the posterior basilar segments of the left lower lobe measuring 1 cm may be scar tissue atelectasis or malignant with SUV of 3.64.  Infrarenal abdominal aortic aneurysm measuring 3.8 cm slightly increased from previous PET scan where was 3.5 cm.  She was referred to thoracic surgery service for tissue diagnosis.    Past Medical History:   Diagnosis Date    Breast cancer     CHF (congestive heart failure)     Colon cancer     Coronary artery disease     Hyperlipidemia     Hypertension     Lung cancer     Myocardial infarction        Past Surgical History:   Procedure Laterality Date    BRONCHOSCOPY N/A 07/21/2023    Procedure: BRONCHOSCOPY WITH BRONCHOALVEOLAR LAVAGE AND ENDOBRONCHIAL ULTRASOUND WITH FINE NEEDLE ASPIRATION;  Surgeon: Carolyn Sierra MD;  Location: [facility] ENDOSCOPY;  Service:  Pulmonary;  Laterality: N/A;    COLON SURGERY      CORONARY ANGIOPLASTY      greater than 5 years ago from      2023    HYSTERECTOMY      MASTECTOMY         Family History   Problem Relation Age of Onset    Heart disease Mother     Heart disease Father     Colon cancer Sister     Heart disease Sister        Social History     Socioeconomic History    Marital status:    Tobacco Use    Smoking status: Former     Passive exposure: Never    Smokeless tobacco: Never   Vaping Use    Vaping status: Never Used   Substance and Sexual Activity    Alcohol use: Not Currently    Drug use: Never    Sexual activity: Defer         Current Outpatient Medications:     aspirin 81 MG tablet, Take 1 tablet by mouth Daily., Disp: , Rfl:     Calcium Carbonate Antacid (Antacid Soft Chews) 1177 MG chewable tablet, Chew., Disp: , Rfl:     Cholecalciferol (Vitamin D3) 50 MCG (2000 UT) capsule, Take 1 capsule by mouth 2 (Two) Times a Day. Take 2 capsules daily, Disp: , Rfl:     citalopram (CeleXA) 10 MG tablet, Take 1 tablet by mouth Daily., Disp: , Rfl:     Cyanocobalamin 1000 MCG/ML kit, Inject  as directed Every 30 (Thirty) Days., Disp: , Rfl:     donepezil (ARICEPT) 10 MG tablet, Take 1 tablet by mouth Every Evening., Disp: 90 tablet, Rfl: 3    lisinopril (PRINIVIL,ZESTRIL) 2.5 MG tablet, Take 1 tablet by mouth Daily., Disp: , Rfl:     metoprolol tartrate (LOPRESSOR) 25 MG tablet, Take 1 tablet by mouth Daily., Disp: 90 tablet, Rfl: 3    omeprazole (priLOSEC) 40 MG capsule, Take 1 capsule by mouth Daily., Disp: , Rfl:     oxybutynin XL (DITROPAN-XL) 5 MG 24 hr tablet, Take 1 tablet by mouth Daily., Disp: , Rfl: 5    rosuvastatin (CRESTOR) 20 MG tablet, Take 1 tablet by mouth Daily., Disp: , Rfl:     levoFLOXacin (Levaquin) 500 MG tablet, Take 1 tablet by mouth Daily for 10 days., Disp: 10 tablet, Rfl: 0     Allergies   Allergen Reactions    Contrast Dye (Echo Or Unknown Ct/Mr) Unknown (See Comments)    Hydrocodone-Acetaminophen  "Unknown (See Comments)    Penicillin G Unknown (See Comments)             Objective    OBJECTIVE:     VITAL SIGNS:  /65 (BP Location: Left arm, Patient Position: Sitting, Cuff Size: Adult)   Pulse 55   Ht 152.4 cm (60\")   Wt 69.4 kg (153 lb)   SpO2 94%   BMI 29.88 kg/m²     PHYSICAL EXAM:  Normal appearance.   Head is normocephalic.   Nose appears normal.   No obvious deformity of the mouth and throat.  Conjunctivae normal.   Heart rate and rhythm is normal.  Pulmonary effort is normal.   Moving all 4 extremities.  Extremities warm.  No focal deficit present.   Alert and oriented to person, place, and time.     LAB RESULTS:  I have reviewed all the available laboratory results in the chart.    RESULTS REVIEW:  I have reviewed the patient's all relevant laboratory and imaging findings.          ASSESSMENT & PLAN:  Goldie Mata is a 83 y.o. female with significant medical conditions as mentioned above presented to my clinic.    Diagnosis: History of non-small cell lung cancer  Staging: Stage III-B    Based on the size, appearance, and PET activity of the 1.4 cm left lower lobe nodule, malignancy is high on my differential.  Due to her age and comorbidity, any invasive procedure carries a high risk of complications.  I discussed with her the options of tissue diagnosis versus empiric radiation treatment.  She is not interested in any invasive procedure.  I will refer her to radiation oncology to consider empiric SBRT.     I discussed the patients findings and my recommendations with the patient. The patient was given adequate time to ask questions and all questions were answered to patient satisfaction. Thank you for this consult and allowing us to participate in the care of your patient.      Drake Camacho MD  Thoracic Surgeon  [facility] [place]        Dictated utilizing Dragon dictation    I spent 60 minutes caring for Goldie on this date of service. This time includes time spent by me " in the following activities:preparing for the visit, reviewing tests, obtaining and/or reviewing a separately obtained history, performing a medically appropriate examination and/or evaluation , counseling and educating the patient/family/caregiver, ordering medications, tests, or procedures, referring and communicating with other health care professionals , documenting information in the medical record, independently interpreting results and communicating that information with the patient/family/caregiver, and care coordination and more than half the time was spent in direct face to face evaluation and decision making.

## 2024-07-12 NOTE — PROGRESS NOTES
[facility] Radiation Oncology   Consult    Chief Complaint  Radiographic Diagnosis of LLL Lung Cancer      Diagnosis: Radiographic Diagnosis of LLL Lung Cancer    Overall Stage IA2    cT1b: 1.4cm on CT Chest  cN0: per CT Chest  cM0: per CT Chest      Pacemaker: no  Prior History of Radiation: yes, as per HPI  Contraindications to Radiation: no  Patient Requires Pregnancy Test: No, patient is female and >55 years and/or has undergone hysterectomy      HPI:    Goldie Mata is an 83 y.o. female with a history of CHF, CAD, DM, STEMI, and non-small cell lung cancer T2 N3 M0 in 2009.  The patient underwent chemotherapy with 6 cycles of carboplatin and Taxotere.  She additionally had radiation from late August to October 2009.  In 2010 the patient had left supraclavicular relapse and received chemotherapy with cisplatin and N she was uncertain whether she received radiation at that time.henry.  She has a more distant history of left-sided breast cancer treated with mastectomy and CMF chemotherapy in 1989. No radiation therapy at that time.     Over the last year or so CT imaging has tracked a growing subpleural nodularity in the superior segment of the left lower lobe.  It is grown from 9 mm to 12 mm, and is now 1.4 cm.  It is increased in avidity as well on interval PET/CT.  The patient also has a new cavitary lesion in the posterior basilar segment of the left lower lobe.  It has some what increased metabolic activity greater than background,  infection/inflammatory as well as malignancy on the differential.  Patient underwent FNA of a mildly avid left hilar lymph node a year ago which was negative.  BAL of the left lower lobe was negative.  The patient has met with Dr. Camacho who  discuss possible biopsy.  Due to her age and comorbidities, ultimately she was referred for definitive SBRT.        Imaging:      CT Chest Without Contrast 4/3/2023  Impression:  1. Small area of subpleural nodularity in the superior segment  left lower lobe measures 12 mm, previously 9 mm, consider short-term CT follow-up in 3-6 months. Additional pulmonary nodules are stable.  2. Severe emphysema with chronic left upper lobe bronchiectasis and scarring at the left apex, stable.  3. Large hiatal hernia.  4. Additional chronic findings above.      PET/CT Scan 5/1/2023  Impression:  The 1.3 cm nodule in the superior segment of the left lower lobe is hypermetabolic. This is suspicious for malignancy. Tissue diagnosis versus short-term follow-up. Infectious/inflammatory nodule less likely. Small hypermetabolic left hilar lymph node may be reactive or neoplastic.      CT Chest Without Contrast 11/6/2023  Impression:  1. No significant change in chest findings compared to the PET/CT 5/1/2023 and the CT chest of 4/3/2023.  2. Stable 1.3 cm and 0.8 cm left upper lobe noncalcified nodules. No new pulmonary nodules.  3. Stable left apical bulla with chronic posterior left apical fibrosis and traction bronchiectasis.  4. Advanced emphysema.  5. Stable appearance of large hiatal hernia.      CT Chest Without Contrast 5/6/2024  Impression:  1. Slightly larger now completely solid superior segment left lower lobe pulmonary nodule. Stable adjacent smaller nodule.      PET/CT Scan 6/17/2024  Impression:   1. Increased metabolic activity within the 1.3 x 1.4 cm nodule in the superior segment of the left lower lobe concerning for malignancy. It is increased in size and activity when compared to the previous whole-body PET/CT exam  2. There is a new cavitary lesion in the posterior basilar segment of the left lower lobe. It either has scarring/atelectasis/anterior wall thickening. There is increased metabolic activity greater than mediastinal background. This could be   infectious/inflammatory or malignant.  3. Unchanged metabolic activity in the left hilum which appears to be associated with a 0.9 cm infrahilar lymph node.  4. No abnormal metabolic activity within the  skull base/neck, abdomen/pelvis, or musculoskeletal/extremities.  5. Slight interval increase in size of the infrarenal abdominal aortic aneurysm now measuring 3.8 cm in the AP dimension as compared to 3.5 cm on the previous study.  6. Additional findings as noted above.        Pathology:      Fine Needle Aspiration 7/21/2023  Final Diagnosis   Specimen #1 and #2 (Lymph node, left hilar, fine needle aspiration with smears and cell block):    Bronchial epithelial cells in a background of macrophages, acute inflammation and blood    No definitive lymphoid tissue is identified    No malignancy identified    See comment   Electronically signed by Jose Carpio MD on 7/24/2023 at 1344   Comment    The biopsy is likely not representative of the clinically targeted lesion. Clinical correlation and additional studies are recommended as clinically indicated.        Non-gynecologic Cytology 7/21/2023  Final Diagnosis   Left lower lobe, bronchoalveolar lavage with smears and cytospin:    Acute inflammation, mature squamous cells, pulmonary macrophages and bronchial epithelial cells     No malignancy identified          Labs:    Lab Results   Component Value Date    CREATININE 1.09 (H) 09/05/2021             Problem List:  Patient Active Problem List   Diagnosis    Abnormal cardiovascular stress test    Congestive heart failure    Coronary artery disease    Diabetes mellitus    Hyperlipidemia    Hypertension    Shortness of breath    ST elevation (STEMI) myocardial infarction    Status post coronary artery stent placement    Encounter for care related to Port-a-Cath    Chest pain    Lung nodule    Hilar lymphadenopathy    Malignant neoplasm of lung          Medications:  Current Outpatient Medications on File Prior to Visit   Medication Sig Dispense Refill    aspirin 81 MG tablet Take 1 tablet by mouth Daily.      Calcium Carbonate Antacid (Antacid Soft Chews) 1177 MG chewable tablet Chew.      Cholecalciferol (Vitamin D3) 50  MCG (2000 UT) capsule Take 1 capsule by mouth 2 (Two) Times a Day. Take 2 capsules daily      citalopram (CeleXA) 10 MG tablet Take 1 tablet by mouth Daily.      Cyanocobalamin 1000 MCG/ML kit Inject  as directed Every 30 (Thirty) Days.      donepezil (ARICEPT) 10 MG tablet Take 1 tablet by mouth Every Evening. 90 tablet 3    lisinopril (PRINIVIL,ZESTRIL) 2.5 MG tablet Take 1 tablet by mouth Daily.      metoprolol tartrate (LOPRESSOR) 25 MG tablet Take 1 tablet by mouth Daily. 90 tablet 3    omeprazole (priLOSEC) 40 MG capsule Take 1 capsule by mouth Daily.      oxybutynin XL (DITROPAN-XL) 5 MG 24 hr tablet Take 1 tablet by mouth Daily.  5    rosuvastatin (CRESTOR) 20 MG tablet Take 1 tablet by mouth Daily.       Current Facility-Administered Medications on File Prior to Visit   Medication Dose Route Frequency Provider Last Rate Last Admin    heparin injection 500 Units  500 Units Intravenous PRN Emelyn Morrell MD   500 Units at 07/15/24 1058    sodium chloride 0.9 % flush 20 mL  20 mL Intravenous PRN Emelyn Morrell MD   20 mL at 07/15/24 1056          Allergies:  Allergies   Allergen Reactions    Contrast Dye (Echo Or Unknown Ct/Mr) Unknown (See Comments)    Hydrocodone-Acetaminophen Unknown (See Comments)    Penicillin G Unknown (See Comments)         Family History:  The patient has no family history of conditions which would be contraindications to radiation therapy      Social History:  Former Smoker    Distance From Clinic: <30 minutes    Patient has someone who can assist with transportation: yes      Review of Systems:    Review of Systems   Constitutional:  Negative for chills, fever and unexpected weight change.   HENT:  Negative for sore throat and trouble swallowing.    Eyes:  Negative for photophobia and visual disturbance.   Respiratory:  Negative for cough, chest tightness and shortness of breath.    Cardiovascular:  Negative for chest pain and leg swelling.   Gastrointestinal:   "Negative for abdominal pain, constipation, diarrhea, nausea and vomiting.   Endocrine: Negative for cold intolerance and heat intolerance.   Genitourinary:  Negative for difficulty urinating, dysuria and frequency.   Musculoskeletal:  Negative for back pain, joint swelling and myalgias.   Skin:  Negative for rash.   Neurological:  Negative for dizziness, seizures, syncope, facial asymmetry, speech difficulty, weakness, light-headedness, numbness and headaches.   Hematological:  Negative for adenopathy.   Psychiatric/Behavioral:  Negative for confusion and suicidal ideas.    All other systems reviewed and are negative.      Vital Signs:  /68   Pulse 59   Resp 20   Wt 67.8 kg (149 lb 6.4 oz)   SpO2 95%   BMI 29.18 kg/m²   Estimated body mass index is 29.18 kg/m² as calculated from the following:    Height as of 7/3/24: 152.4 cm (60\").    Weight as of this encounter: 67.8 kg (149 lb 6.4 oz).  Pain Score    07/15/24 0933   PainSc: 0-No pain         ECOG: Fully active, able to carry on all pre-disease performance without restriction = 0    Physical Exam  Vitals reviewed.   Constitutional:       General: She is not in acute distress.     Appearance: Normal appearance.   HENT:      Head: Normocephalic and atraumatic.   Eyes:      Extraocular Movements: Extraocular movements intact.      Pupils: Pupils are equal, round, and reactive to light.   Pulmonary:      Effort: Pulmonary effort is normal.   Abdominal:      General: Abdomen is flat.      Palpations: Abdomen is soft.   Musculoskeletal:      Cervical back: Normal range of motion.   Skin:     General: Skin is warm and dry.   Neurological:      General: No focal deficit present.      Mental Status: She is alert and oriented to person, place, and time.   Psychiatric:         Mood and Affect: Mood normal.         Behavior: Behavior normal.          Result Review :  The following data was reviewed by: Marshall Talamantes MD on 07/15/2024:  Labs: Last Creatinine   Data " reviewed : Radiologic studies CT Chest, PET CT             Diagnoses and all orders for this visit:    1. Malignant neoplasm of upper lobe of left lung (Primary)        Assessment:      Goldie Mata is an 83 y.o. female with a history of CHF, CAD, DM, STEMI, and non-small cell lung cancer T2 N3 M0 in 2009.  The patient underwent chemotherapy with 6 cycles of carboplatin and Taxotere.  She additionally had radiation from late August to October 2009.  In 2010 the patient had left supraclavicular relapse and received chemotherapy with cisplatin and N she was uncertain whether she received radiation at that time.avalka.  She has a more distant history of left-sided breast cancer treated with mastectomy and CMF chemotherapy in 1989. No radiation therapy at that time.     Over the last year or so CT imaging has tracked a growing subpleural nodularity in the superior segment of the left lower lobe.  It is grown from 9 mm to 12 mm, and is now 1.4 cm.  It is increased in avidity as well on interval PET/CT.  The patient also has a new cavitary lesion in the posterior basilar segment of the left lower lobe.  It has some what increased metabolic activity greater than background,  infection/inflammatory as well as malignancy on the differential.  Patient underwent FNA of a mildly avid left hilar lymph node a year ago which was negative.  BAL of the left lower lobe was negative.  The patient has met with Dr. Camacho who  discuss possible biopsy.  Due to her age and comorbidities, ultimately she was referred for definitive SBRT.    I met with the patient and discussed her workup and treatment to date in detail.  I discussed the case with Dr. Camacho.  Overall, we favor treating the previously known right lower lobe growing solid pleural-based nodule with definitive SBRT.  We favor following the more inferior cavitary lesion on subsequent surveillance imaging.  I discussed the risk, benefits, side effects, logistics of radiation  treatment planning delivery with the patient.  I answered all the patient's questions to her satisfaction.  We will try and obtain previous radiation records to evaluate for potential overlap.  Assuming no significant overlap will likely treat with 5 fractions of SBRT daily.  At the end of our conversation the patient wished to proceed with radiation therapy for radiographically diagnosed right lower lobe lung cancer.  Consents were signed.  Plan for CT simulation in 1 week.      Plan:    -Tentatively plan on SBRT to a radiographically diagnosed right lower lobe lung cancer.  Consent signed.  CT simulation in 1 week.       I spent 60 minutes caring for Goldie on this date of service. This time includes time spent by me in the following activities:preparing for the visit, reviewing tests, obtaining and/or reviewing a separately obtained history, referring and communicating with other health care professionals , and documenting information in the medical record  Follow Up   No follow-ups on file.  Patient was given instructions and counseling regarding her condition or for health maintenance advice. Please see specific information pulled into the AVS if appropriate.     Marshall Talamantes MD

## 2024-07-15 ENCOUNTER — HOSPITAL ENCOUNTER (OUTPATIENT)
Dept: ONCOLOGY | Facility: HOSPITAL | Age: 84
Discharge: HOME | End: 2024-07-15
Admitting: INTERNAL MEDICINE
Payer: MEDICARE

## 2024-07-15 ENCOUNTER — CONSULT (OUTPATIENT)
Dept: RADIATION ONCOLOGY | Facility: HOSPITAL | Age: 84
End: 2024-07-15
Payer: MEDICARE

## 2024-07-15 VITALS
RESPIRATION RATE: 20 BRPM | BODY MASS INDEX: 29.18 KG/M2 | SYSTOLIC BLOOD PRESSURE: 119 MMHG | WEIGHT: 149.4 LBS | HEART RATE: 59 BPM | DIASTOLIC BLOOD PRESSURE: 68 MMHG | OXYGEN SATURATION: 95 %

## 2024-07-15 DIAGNOSIS — C34.12 MALIGNANT NEOPLASM OF UPPER LOBE OF LEFT LUNG: Primary | ICD-10-CM

## 2024-07-15 DIAGNOSIS — Z45.2 ENCOUNTER FOR CARE RELATED TO PORT-A-CATH: Primary | ICD-10-CM

## 2024-07-15 PROCEDURE — G0463 HOSPITAL OUTPT CLINIC VISIT: HCPCS | Performed by: STUDENT IN AN ORGANIZED HEALTH CARE EDUCATION/TRAINING PROGRAM

## 2024-07-15 PROCEDURE — 25010000002 HEPARIN LOCK FLUSH PER 10 UNITS: Performed by: INTERNAL MEDICINE

## 2024-07-15 PROCEDURE — 96523 IRRIG DRUG DELIVERY DEVICE: CPT

## 2024-07-15 RX ORDER — SODIUM CHLORIDE 0.9 % (FLUSH) 0.9 %
20 SYRINGE (ML) INJECTION AS NEEDED
Status: DISCONTINUED | OUTPATIENT
Start: 2024-07-15 | End: 2024-07-16 | Stop reason: HOSPADM

## 2024-07-15 RX ORDER — HEPARIN SODIUM (PORCINE) LOCK FLUSH IV SOLN 100 UNIT/ML 100 UNIT/ML
500 SOLUTION INTRAVENOUS AS NEEDED
Status: DISCONTINUED | OUTPATIENT
Start: 2024-07-15 | End: 2024-07-16 | Stop reason: HOSPADM

## 2024-07-15 RX ADMIN — Medication 20 ML: at 10:56

## 2024-07-15 RX ADMIN — HEPARIN 500 UNITS: 100 SYRINGE at 10:58

## 2024-07-22 ENCOUNTER — HOSPITAL ENCOUNTER (OUTPATIENT)
Dept: RADIATION ONCOLOGY | Facility: HOSPITAL | Age: 84
Discharge: HOME | End: 2024-07-22
Payer: MEDICARE

## 2024-07-22 ENCOUNTER — HOSPITAL ENCOUNTER (OUTPATIENT)
Dept: RADIATION ONCOLOGY | Facility: HOSPITAL | Age: 84
Setting detail: RADIATION/ONCOLOGY SERIES
End: 2024-07-22
Payer: MEDICARE

## 2024-07-22 PROCEDURE — 77334 RADIATION TREATMENT AID(S): CPT | Performed by: STUDENT IN AN ORGANIZED HEALTH CARE EDUCATION/TRAINING PROGRAM

## 2024-07-22 PROCEDURE — 77263 THER RADIOLOGY TX PLNG CPLX: CPT | Performed by: STUDENT IN AN ORGANIZED HEALTH CARE EDUCATION/TRAINING PROGRAM

## 2024-07-23 PROCEDURE — 77293 RESPIRATOR MOTION MGMT SIMUL: CPT | Performed by: STUDENT IN AN ORGANIZED HEALTH CARE EDUCATION/TRAINING PROGRAM

## 2024-07-23 PROCEDURE — 77300 RADIATION THERAPY DOSE PLAN: CPT | Performed by: STUDENT IN AN ORGANIZED HEALTH CARE EDUCATION/TRAINING PROGRAM

## 2024-07-23 PROCEDURE — 77338 DESIGN MLC DEVICE FOR IMRT: CPT | Performed by: STUDENT IN AN ORGANIZED HEALTH CARE EDUCATION/TRAINING PROGRAM

## 2024-07-23 PROCEDURE — 77301 RADIOTHERAPY DOSE PLAN IMRT: CPT | Performed by: STUDENT IN AN ORGANIZED HEALTH CARE EDUCATION/TRAINING PROGRAM

## 2024-07-24 DIAGNOSIS — I71.40 ABDOMINAL ANEURYSM: Primary | ICD-10-CM

## 2024-07-31 ENCOUNTER — HOSPITAL ENCOUNTER (OUTPATIENT)
Dept: RADIATION ONCOLOGY | Facility: HOSPITAL | Age: 84
Discharge: HOME | End: 2024-07-31

## 2024-07-31 LAB
RAD ONC ARIA COURSE ID: NORMAL
RAD ONC ARIA COURSE LAST TREATMENT DATE: NORMAL
RAD ONC ARIA COURSE START DATE: NORMAL
RAD ONC ARIA COURSE TREATMENT ELAPSED DAYS: 0
RAD ONC ARIA FIRST TREATMENT DATE: NORMAL
RAD ONC ARIA PLAN FRACTIONS TREATED TO DATE: 1
RAD ONC ARIA PLAN ID: NORMAL
RAD ONC ARIA PLAN PRESCRIBED DOSE PER FRACTION: 10 GY
RAD ONC ARIA PLAN PRIMARY REFERENCE POINT: NORMAL
RAD ONC ARIA PLAN TOTAL FRACTIONS PRESCRIBED: 5
RAD ONC ARIA PLAN TOTAL PRESCRIBED DOSE: 5000 CGY
RAD ONC ARIA REFERENCE POINT DOSAGE GIVEN TO DATE: 10 GY
RAD ONC ARIA REFERENCE POINT ID: NORMAL
RAD ONC ARIA REFERENCE POINT SESSION DOSAGE GIVEN: 10 GY

## 2024-07-31 PROCEDURE — 77373 STRTCTC BDY RAD THER TX DLVR: CPT | Performed by: STUDENT IN AN ORGANIZED HEALTH CARE EDUCATION/TRAINING PROGRAM

## 2024-08-01 ENCOUNTER — HOSPITAL ENCOUNTER (OUTPATIENT)
Dept: RADIATION ONCOLOGY | Facility: HOSPITAL | Age: 84
Setting detail: RADIATION/ONCOLOGY SERIES
End: 2024-08-01
Payer: MEDICARE

## 2024-08-01 PROCEDURE — 77336 RADIATION PHYSICS CONSULT: CPT | Performed by: STUDENT IN AN ORGANIZED HEALTH CARE EDUCATION/TRAINING PROGRAM

## 2024-08-02 ENCOUNTER — HOSPITAL ENCOUNTER (OUTPATIENT)
Dept: RADIATION ONCOLOGY | Facility: HOSPITAL | Age: 84
Discharge: HOME | End: 2024-08-02

## 2024-08-02 LAB
RAD ONC ARIA COURSE ID: NORMAL
RAD ONC ARIA COURSE LAST TREATMENT DATE: NORMAL
RAD ONC ARIA COURSE START DATE: NORMAL
RAD ONC ARIA COURSE TREATMENT ELAPSED DAYS: 2
RAD ONC ARIA FIRST TREATMENT DATE: NORMAL
RAD ONC ARIA PLAN FRACTIONS TREATED TO DATE: 2
RAD ONC ARIA PLAN ID: NORMAL
RAD ONC ARIA PLAN PRESCRIBED DOSE PER FRACTION: 10 GY
RAD ONC ARIA PLAN PRIMARY REFERENCE POINT: NORMAL
RAD ONC ARIA PLAN TOTAL FRACTIONS PRESCRIBED: 5
RAD ONC ARIA PLAN TOTAL PRESCRIBED DOSE: 5000 CGY
RAD ONC ARIA REFERENCE POINT DOSAGE GIVEN TO DATE: 20 GY
RAD ONC ARIA REFERENCE POINT ID: NORMAL
RAD ONC ARIA REFERENCE POINT SESSION DOSAGE GIVEN: 10 GY

## 2024-08-02 PROCEDURE — 77373 STRTCTC BDY RAD THER TX DLVR: CPT | Performed by: STUDENT IN AN ORGANIZED HEALTH CARE EDUCATION/TRAINING PROGRAM

## 2024-08-05 ENCOUNTER — HOSPITAL ENCOUNTER (OUTPATIENT)
Dept: RADIATION ONCOLOGY | Facility: HOSPITAL | Age: 84
Discharge: HOME | End: 2024-08-05
Payer: MEDICARE

## 2024-08-05 ENCOUNTER — RADIATION ONCOLOGY WEEKLY ASSESSMENT (OUTPATIENT)
Dept: RADIATION ONCOLOGY | Facility: HOSPITAL | Age: 84
End: 2024-08-05
Payer: MEDICARE

## 2024-08-05 ENCOUNTER — APPOINTMENT (OUTPATIENT)
Dept: RADIATION ONCOLOGY | Facility: HOSPITAL | Age: 84
End: 2024-08-05
Payer: MEDICARE

## 2024-08-05 VITALS
TEMPERATURE: 97.9 F | HEART RATE: 61 BPM | OXYGEN SATURATION: 93 % | SYSTOLIC BLOOD PRESSURE: 121 MMHG | BODY MASS INDEX: 29.53 KG/M2 | HEIGHT: 60 IN | WEIGHT: 150.4 LBS | RESPIRATION RATE: 16 BRPM | DIASTOLIC BLOOD PRESSURE: 69 MMHG

## 2024-08-05 DIAGNOSIS — C34.32 CANCER OF LOWER LOBE OF LEFT LUNG: Primary | ICD-10-CM

## 2024-08-05 LAB
RAD ONC ARIA COURSE ID: NORMAL
RAD ONC ARIA COURSE LAST TREATMENT DATE: NORMAL
RAD ONC ARIA COURSE START DATE: NORMAL
RAD ONC ARIA COURSE TREATMENT ELAPSED DAYS: 5
RAD ONC ARIA FIRST TREATMENT DATE: NORMAL
RAD ONC ARIA PLAN FRACTIONS TREATED TO DATE: 3
RAD ONC ARIA PLAN ID: NORMAL
RAD ONC ARIA PLAN PRESCRIBED DOSE PER FRACTION: 10 GY
RAD ONC ARIA PLAN PRIMARY REFERENCE POINT: NORMAL
RAD ONC ARIA PLAN TOTAL FRACTIONS PRESCRIBED: 5
RAD ONC ARIA PLAN TOTAL PRESCRIBED DOSE: 5000 CGY
RAD ONC ARIA REFERENCE POINT DOSAGE GIVEN TO DATE: 30 GY
RAD ONC ARIA REFERENCE POINT ID: NORMAL
RAD ONC ARIA REFERENCE POINT SESSION DOSAGE GIVEN: 10 GY

## 2024-08-05 PROCEDURE — 77373 STRTCTC BDY RAD THER TX DLVR: CPT | Performed by: STUDENT IN AN ORGANIZED HEALTH CARE EDUCATION/TRAINING PROGRAM

## 2024-08-05 NOTE — PROGRESS NOTES
[facility] RADIATION ONCOLOGY  ON-TREATMENT VISIT NOTE    NAME: Goldie Mata  YOB: 1940  MRN #: [id number]  DATE OF SERVICE: 8/5/2024  PRESCRIBING PHYSICIAN: Marshall Talamantes MD    DIAGNOSIS:      ICD-10-CM ICD-9-CM   1. Cancer of lower lobe of left lung  C34.32 162.5      RADIATION THERAPY VISIT:  Continue radiation therapy, Dosimetry plan remains acceptable, Films reviewed and remains acceptable, Pain assessed, Pain management planned, Radiation dose schedule reviewed and remains acceptable, Radiation technique remains acceptable, and Symptoms within expected range    Radiation Treatments       Active   Plans   LtLung SBRT   Most recent treatment: Dose planned: 1,000 cGy (fraction 3 on 8/5/2024)   Total: Dose planned: 5,000 cGy (5 fractions)   Elapsed Days: 5      Reference Points   LtLung SBRT   Most recent treatment: Dose given: 1,000 cGy (on 8/5/2024)   Total: Dose given: 3,000 cGy   Elapsed Days: 5                   PHYSICAL ASSESSMENT         Vitals:    08/05/24 1324   BP: 121/69   Pulse: 61   Resp: 16   Temp: 97.9 °F (36.6 °C)   SpO2: 93%      Wt Readings from Last 3 Encounters:   08/05/24 68.2 kg (150 lb 6.4 oz)   07/15/24 67.8 kg (149 lb 6.4 oz)   07/03/24 68.5 kg (151 lb)     Restricted in physically strenuous activity but ambulatory and able to carry out work of a light or sedentary nature, e.g., light house work, office work = 1    We examined the relevant areas: yes  Findings are within the expected range for this stage of treatment: yes    ACTION ITEMS     Patient tolerating treatment well and as expected for this stage in their treatment and Continue radiation therapy as planned    Estimated Completion Date: 8/9/2024    Anticipatory guidance provided.     Plan for follow-up with CT chest in 3 months.    Ethan Robles MD  Radiation Oncology  [facility]

## 2024-08-07 ENCOUNTER — HOSPITAL ENCOUNTER (OUTPATIENT)
Dept: RADIATION ONCOLOGY | Facility: HOSPITAL | Age: 84
Discharge: HOME | End: 2024-08-07

## 2024-08-07 LAB

## 2024-08-07 PROCEDURE — 77373 STRTCTC BDY RAD THER TX DLVR: CPT | Performed by: STUDENT IN AN ORGANIZED HEALTH CARE EDUCATION/TRAINING PROGRAM

## 2024-08-08 NOTE — PROGRESS NOTES
Hematology/Oncology Outpatient Follow Up    PATIENT NAME:Goldie Mata  :1940  MRN: [id number]  PRIMARY CARE PHYSICIAN: Olman Hatch PA-C  REFERRING PHYSICIAN: No ref. provider found    Chief Complaint   Patient presents with    Follow-up     Malignant neoplasm of upper lobe of left lung            HISTORY OF PRESENT ILLNESS:   Transfer from Dr. 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  She received his carboplatinum and Taxotere for total of 6 cycles      she had radiation treatment to the chest from 2009 to 10/12/2009     2010 following left supraclavicular relapse patient received chemotherapy with cisplatin and Navelbin.  Not sure if she received XRT at that time     She developed left breast cancer back in  for which she had mastectomy followed by [facility] chemotherapy for 6 months.  There was no hormonal therapy or radiation treatment recommended     In  she developed colonic cancer.  Her last surveillance colonoscopy was in 2019     Patient is due to have a colonoscopy in            Patient  used to smoke   She does not drink alcohol  She lives alone and able to carry out ADLs  She was exposed to chemicals        Family history of cancer with 2 sisters with ovarian and brain cancer in their 60s    May 6, 2024 patient had CT scan of the chest which basically showed slightly larger now completely solid segment left lower lobe pulmonary nodule stable at the 7 smaller nodule.  2024 patient had a PET CT scan to further evaluate the enlarging left lower lobe nodule.  This  showed increased metabolic activity in the 1.4 cm nodule located in the superior segment of the left lower lobe SUV 4.5 cc concerning for malignancy and biopsy has been recommended.  There is a small cavitary lesion in the posterior basilar segments of the left lower lobe measuring 1 cm may be scar tissue atelectasis or malignant.  SUV was 3.64 infrarenal abdominal aortic aneurysm measuring 3.8 cm slightly increased from previous PET scan where was 3.5 cm  Patient was referred to Dr. Camacho.  She is declining biopsy of the left lung nodule  Patient completed SRS to lung mass August 2024.  She is scheduled for follow-up CT chest in October 2024  Past Medical History:   Diagnosis Date    Breast cancer     CHF (congestive heart failure)     Colon cancer     Coronary artery disease     Hyperlipidemia     Hypertension     Lung cancer     Myocardial infarction        Past Surgical History:   Procedure Laterality Date    BRONCHOSCOPY N/A 07/21/2023    Procedure: BRONCHOSCOPY WITH BRONCHOALVEOLAR LAVAGE AND ENDOBRONCHIAL ULTRASOUND WITH FINE NEEDLE ASPIRATION;  Surgeon: Carolyn Sierra MD;  Location: [facility] ENDOSCOPY;  Service: Pulmonary;  Laterality: N/A;    COLON SURGERY      CORONARY ANGIOPLASTY      greater than 5 years ago from      2023    HYSTERECTOMY      MASTECTOMY           Current Outpatient Medications:     aspirin 81 MG tablet, Take 1 tablet by mouth Daily., Disp: , Rfl:     Calcium Carbonate Antacid (Antacid Soft Chews) 1177 MG chewable tablet, Chew., Disp: , Rfl:     Cholecalciferol (Vitamin D3) 50 MCG (2000 UT) capsule, Take 1 capsule by mouth 2 (Two) Times a Day. Take 2 capsules daily, Disp: , Rfl:     citalopram (CeleXA) 10 MG tablet, Take 1 tablet by mouth Daily., Disp: , Rfl:     Cyanocobalamin 1000 MCG/ML kit, Inject  as directed Every 30 (Thirty) Days., Disp: , Rfl:     donepezil (ARICEPT) 10 MG tablet, Take 1 tablet by mouth Every Evening., Disp: 90 tablet, Rfl: 3    lisinopril (PRINIVIL,ZESTRIL) 2.5 MG  tablet, Take 1 tablet by mouth Daily., Disp: , Rfl:     metoprolol tartrate (LOPRESSOR) 25 MG tablet, Take 1 tablet by mouth Daily., Disp: 90 tablet, Rfl: 3    omeprazole (priLOSEC) 40 MG capsule, Take 1 capsule by mouth Daily., Disp: , Rfl:     oxybutynin XL (DITROPAN-XL) 5 MG 24 hr tablet, Take 1 tablet by mouth Daily., Disp: , Rfl: 5    rosuvastatin (CRESTOR) 20 MG tablet, Take 1 tablet by mouth Daily., Disp: , Rfl:   No current facility-administered medications for this visit.    Facility-Administered Medications Ordered in Other Visits:     heparin injection 500 Units, 500 Units, Intravenous, PRN, Emelyn Morrell MD, 500 Units at 08/13/24 0906    sodium chloride 0.9 % flush 20 mL, 20 mL, Intravenous, PRN, Emelyn Morrell MD, 20 mL at 08/13/24 0906    Allergies   Allergen Reactions    Contrast Dye (Echo Or Unknown Ct/Mr) Unknown (See Comments)    Hydrocodone-Acetaminophen Unknown (See Comments)    Penicillin G Unknown (See Comments)       Family History   Problem Relation Age of Onset    Heart disease Mother     Heart disease Father     Colon cancer Sister     Heart disease Sister        Cancer-related family history includes Colon cancer in her sister.    Social History     Tobacco Use    Smoking status: Former     Passive exposure: Never    Smokeless tobacco: Never   Vaping Use    Vaping status: Never Used   Substance Use Topics    Alcohol use: Not Currently    Drug use: Never     I have reviewed and confirmed the accuracy of the patient's history: Chief complaint, HPI, ROS, and Subjective as entered by the MA/LPN/RN. Emelyn Morrell MD 08/13/24      SUBJECTIVE:     Patient is here today for routine follow-up    REVIEW OF SYSTEMS:  Review of Systems   Constitutional:  Negative for chills, fatigue and fever.   HENT:  Negative for congestion, drooling, ear discharge, rhinorrhea, sinus pressure and tinnitus.    Eyes:  Negative for photophobia, pain and discharge.   Respiratory:  Negative  "for apnea, choking and stridor.    Cardiovascular:  Negative for palpitations.   Gastrointestinal:  Negative for abdominal distention, abdominal pain and anal bleeding.   Endocrine: Negative for polydipsia and polyphagia.   Genitourinary:  Negative for decreased urine volume, flank pain and genital sores.   Musculoskeletal:  Negative for gait problem, neck pain and neck stiffness.   Skin:  Negative for color change, rash and wound.   Neurological:  Negative for tremors, seizures, syncope, facial asymmetry and speech difficulty.   Hematological:  Negative for adenopathy.   Psychiatric/Behavioral:  Negative for agitation, confusion, hallucinations and self-injury. The patient is not hyperactive.        Negative    OBJECTIVE:    Vitals:    08/13/24 0927   BP: 126/74   Pulse: 57   Resp: 18   Temp: 98 °F (36.7 °C)   TempSrc: Infrared   SpO2: 95%   Weight: 68 kg (150 lb)   Height: 154.9 cm (61\")   PainSc: 0-No pain       Body mass index is 28.34 kg/m².    ECOG  (1) Restricted in physically strenuous activity, ambulatory and able to do work of light nature    Physical Exam  Vitals and nursing note reviewed.   Constitutional:       General: She is not in acute distress.     Appearance: She is not diaphoretic.   HENT:      Head: Normocephalic and atraumatic.   Eyes:      General: No scleral icterus.        Right eye: No discharge.         Left eye: No discharge.      Conjunctiva/sclera: Conjunctivae normal.   Neck:      Thyroid: No thyromegaly.   Cardiovascular:      Rate and Rhythm: Normal rate and regular rhythm.      Heart sounds: Normal heart sounds.      No friction rub. No gallop.   Pulmonary:      Effort: Pulmonary effort is normal. No respiratory distress.      Breath sounds: No stridor. No wheezing.   Abdominal:      General: Bowel sounds are normal.      Palpations: Abdomen is soft. There is no mass.      Tenderness: There is no abdominal tenderness. There is no guarding or rebound.   Musculoskeletal:         " General: No tenderness. Normal range of motion.      Cervical back: Normal range of motion and neck supple.   Lymphadenopathy:      Cervical: No cervical adenopathy.   Skin:     General: Skin is warm.      Findings: No erythema or rash.   Neurological:      Mental Status: She is alert and oriented to person, place, and time.      Motor: No abnormal muscle tone.   Psychiatric:         Behavior: Behavior normal.       No changes  RECENT LABS  WBC   Date Value Ref Range Status   08/13/2024 5.49 3.40 - 10.80 10*3/mm3 Final     RBC   Date Value Ref Range Status   08/13/2024 4.35 3.77 - 5.28 10*6/mm3 Final     Hemoglobin   Date Value Ref Range Status   08/13/2024 12.9 12.0 - 15.9 g/dL Final     Hematocrit   Date Value Ref Range Status   08/13/2024 39.9 34.0 - 46.6 % Final     MCV   Date Value Ref Range Status   08/13/2024 91.7 79.0 - 97.0 fL Final     MCH   Date Value Ref Range Status   08/13/2024 29.7 26.6 - 33.0 pg Final     MCHC   Date Value Ref Range Status   08/13/2024 32.3 31.5 - 35.7 g/dL Final     RDW   Date Value Ref Range Status   08/13/2024 13.7 12.3 - 15.4 % Final     RDW-SD   Date Value Ref Range Status   08/13/2024 44.8 37.0 - 54.0 fl Final     MPV   Date Value Ref Range Status   08/13/2024 9.8 6.0 - 12.0 fL Final     Platelets   Date Value Ref Range Status   08/13/2024 185 140 - 450 10*3/mm3 Final     Neutrophil %   Date Value Ref Range Status   08/13/2024 72.6 42.7 - 76.0 % Final     Lymphocyte %   Date Value Ref Range Status   08/13/2024 13.7 (L) 19.6 - 45.3 % Final     Monocyte %   Date Value Ref Range Status   08/13/2024 9.5 5.0 - 12.0 % Final     Eosinophil %   Date Value Ref Range Status   08/13/2024 3.8 0.3 - 6.2 % Final     Basophil %   Date Value Ref Range Status   08/13/2024 0.4 0.0 - 1.5 % Final     Immature Grans %   Date Value Ref Range Status   11/25/2019 0.2 0.0 - 0.5 % Final     Neutrophils, Absolute   Date Value Ref Range Status   08/13/2024 3.99 1.70 - 7.00 10*3/mm3 Final     Lymphocytes,  Absolute   Date Value Ref Range Status   08/13/2024 0.75 0.70 - 3.10 10*3/mm3 Final     Monocytes, Absolute   Date Value Ref Range Status   08/13/2024 0.52 0.10 - 0.90 10*3/mm3 Final     Eosinophils, Absolute   Date Value Ref Range Status   08/13/2024 0.21 0.00 - 0.40 10*3/mm3 Final     Basophils, Absolute   Date Value Ref Range Status   08/13/2024 0.02 0.00 - 0.20 10*3/mm3 Final     Immature Grans, Absolute   Date Value Ref Range Status   11/25/2019 0.01 0.00 - 0.05 10*3/mm3 Final     nRBC   Date Value Ref Range Status   07/21/2023 0.0 0.0 - 0.2 /100 WBC Final       Lab Results   Component Value Date    GLUCOSE 119 (H) 09/05/2021    BUN 15 09/05/2021    CREATININE 1.09 (H) 09/05/2021    EGFRIFNONA 48 (L) 09/05/2021    BCR 13.8 09/05/2021    K 4.0 09/05/2021    CO2 26.0 09/05/2021    CALCIUM 9.0 09/05/2021    ALBUMIN 3.5 01/07/2019    LABIL2 1.5 01/07/2019    AST 22 01/07/2019    ALT 11 (L) 01/07/2019         Assessment & Plan     Malignant neoplasm of upper lobe of left lung  - CBC & Differential        ASSESSMENT:    Malignant neoplasm of upper lobe of left lung  - CBC & Differential        Enlarging left lower lobe pulmonary nodule measuring 1.4 cm, PET avid suspicious for malignancy.  Small cavitary lesion posterior basilar segment of the left lower lobe 1 cm may be malignant inflammatory or infection  Patient declines biopsy of the nodule  Status post SRS to lung mass  Enlarging aneurysm: Refer to Dr. Andrade.  Appointment is coming up  History of non small cell lung cancer stage IIIb approximately 2009  Breast cancer in 1989 status post left mastectomy no adjuvant chemotherapy or radiation recommended  Colon cancer status post colon resection followed by chemotherapy in her 40s: 1988  Family history of cancer as listed above   Continue monthly port maintenance           Plans           Refer to Dr. Robles for evaluation for possible SRS to the left lung nodule which is suspicious    Surveillance CT scans  coming up end of October 2024      Give patient information on cancer genetics to review.     Schedule patient for unilateral right screening mammogram which was completed 6/28/2024 normal    Continue monthly port flushes      Follow-up first week in November 2024             I spent 30 total minutes, face-to-face, caring for Goldie today. 90% of this time involved counseling and/or coordination of care as documented within this note.

## 2024-08-09 ENCOUNTER — RADIATION ONCOLOGY WEEKLY ASSESSMENT (OUTPATIENT)
Dept: RADIATION ONCOLOGY | Facility: HOSPITAL | Age: 84
End: 2024-08-09
Payer: MEDICARE

## 2024-08-09 ENCOUNTER — HOSPITAL ENCOUNTER (OUTPATIENT)
Dept: RADIATION ONCOLOGY | Facility: HOSPITAL | Age: 84
Discharge: HOME | End: 2024-08-09

## 2024-08-09 ENCOUNTER — TREATMENT (OUTPATIENT)
Dept: RADIATION ONCOLOGY | Facility: HOSPITAL | Age: 84
End: 2024-08-09
Payer: MEDICARE

## 2024-08-09 DIAGNOSIS — C34.32 CANCER OF LOWER LOBE OF LEFT LUNG: Primary | ICD-10-CM

## 2024-08-09 LAB
RAD ONC ARIA COURSE ID: NORMAL
RAD ONC ARIA COURSE LAST TREATMENT DATE: NORMAL
RAD ONC ARIA COURSE START DATE: NORMAL
RAD ONC ARIA COURSE TREATMENT ELAPSED DAYS: 9
RAD ONC ARIA FIRST TREATMENT DATE: NORMAL
RAD ONC ARIA PLAN FRACTIONS TREATED TO DATE: 5
RAD ONC ARIA PLAN ID: NORMAL
RAD ONC ARIA PLAN PRESCRIBED DOSE PER FRACTION: 10 GY
RAD ONC ARIA PLAN PRIMARY REFERENCE POINT: NORMAL
RAD ONC ARIA PLAN TOTAL FRACTIONS PRESCRIBED: 5
RAD ONC ARIA PLAN TOTAL PRESCRIBED DOSE: 5000 CGY
RAD ONC ARIA REFERENCE POINT DOSAGE GIVEN TO DATE: 50 GY
RAD ONC ARIA REFERENCE POINT ID: NORMAL
RAD ONC ARIA REFERENCE POINT SESSION DOSAGE GIVEN: 10 GY

## 2024-08-09 PROCEDURE — 77373 STRTCTC BDY RAD THER TX DLVR: CPT | Performed by: STUDENT IN AN ORGANIZED HEALTH CARE EDUCATION/TRAINING PROGRAM

## 2024-08-09 NOTE — PATIENT INSTRUCTIONS
RADIATION THERAPY DISCHARGE INSTRUCTIONS  Chest    CONGRATULATIONS! You completed 5 radiation treatments for treatment of your left lung cancer. These discharge instructions are important for you to follow until your one-month follow up appointment with your radiation oncologist. Please make sure to review these instructions and call the Radiation Oncology Department if you have any questions or concerns with symptoms you may experience. Thank you for trusting us with your cancer treatment!    DIET  Eat a regular, well balanced diet that is high in protein such as meat, eggs, cheese, and nut butters.  Drink 48 to 64 ounces of fluid daily.  Use nutritional supplements if you are not able to eat full meals.  Monitor your weight and report continued weight loss to your doctor.    MEDICATIONS  Use Tylenol as needed to decrease discomfort and irritation to treatment area.  Take pain medications only as prescribed.  Take a laxative or stool softener as needed to prevent constipation due to pain medications.  (If applicable) Use Rachana's Magic Mouthwash or other oral pain relief medication before meals and before taking medications as needed to ease the discomfort of swallowing.    SKIN CARE  Wash treated skin gently with your hands using a mild, non-drying soap such as Dove® or Aveeno® until skin returns to normal.  Pat skin dry - do not rub.  Keep treated skin moist with twice daily applications of Eucerin® or Aquaphor®.  Always protect your treated skin when outdoors by wearing protective clothing and applying sunscreen SPF 15 or higher at least 30 minutes before going outdoors and reapply frequently.    ACTIVITY  Fatigue is a normal side effect of radiation therapy and should improve over time.  Alternate rest and activity.  Exercise such as walking may help to improve your fatigue.    FOLLOW-UP  Continue follow-up appointments with all other doctors as necessary.  Call your radiation oncology doctor if you are  concerned with any side effects you are experiencing.    WHEN TO CALL YOUR RADIATION ONCOLOGIST OR NURSE: [phone]  You have a fever of 100.4 OF or higher.  You have chills.  Your pain or discomfort is getting worse.  Your skin in the treatment area is getting more red or swollen, feels hard or hot, has a rash or blisters, and/or is itchy.  You see drainage (liquid) coming from your skin in the treatment area.  You see any new open areas (wounds) or changes to your skin.  You have any questions or concerns.    _______________________________________________________________________    Completed by: Amie Daigle MA on 8/9/2024 at 09:41 EDT

## 2024-08-09 NOTE — PROGRESS NOTES
RADIATION THERAPY COMPLETION and DISCHARGE     Goldie Mata completed radiation therapy on 08/09/2024 for Cancer of lower lobe of left lung [C34.32]. The summary of her treatment is as follows:    TREATMENT SITE:   Lt Lung SBRT START DATE:   07/31/2024   TOTAL DOSE (cGy):   5000 END DATE:   08/09/2024    DOSE/FRACTION:   1000 ELAPSED DAYS:   8   TOTAL FACTIONS:   5 PHYSICIAN:    Dr. Marshall Talamantes     Goldie is scheduled for a one-month follow-up appointment with Dr. Marshall Talamantes on September 9, 2024 at 10:00 am.  _______________________________________________________________________    The following instructions were provided to the patient and/or family in their printed after visit summary (AVS) as well as discussed in-person by the radiation oncology nurse or medical assistant. The patient and/or family had the opportunity to ask questions and verbalized their questions were adequately answered. Encouraged patient to contact our department with any questions or concerns.      RADIATION THERAPY DISCHARGE INSTRUCTIONS  Chest    CONGRATULATIONS! You completed 5 radiation treatments for treatment of your left lung cancer. These discharge instructions are important for you to follow until your one-month follow up appointment with your radiation oncologist. Please make sure to review these instructions and call the Radiation Oncology Department if you have any questions or concerns with symptoms you may experience. Thank you for trusting us with your cancer treatment!    DIET  Eat a regular, well balanced diet that is high in protein such as meat, eggs, cheese, and nut butters.  Drink 48 to 64 ounces of fluid daily.  Use nutritional supplements if you are not able to eat full meals.  Monitor your weight and report continued weight loss to your doctor.    MEDICATIONS  Use Tylenol as needed to decrease discomfort and irritation to treatment area.  Take pain medications only as prescribed.  Take a laxative or stool  softener as needed to prevent constipation due to pain medications.  (If applicable) Use Rachana's Magic Mouthwash or other oral pain relief medication before meals and before taking medications as needed to ease the discomfort of swallowing.    SKIN CARE  Wash treated skin gently with your hands using a mild, non-drying soap such as Dove® or Aveeno® until skin returns to normal.  Pat skin dry - do not rub.  Keep treated skin moist with twice daily applications of Eucerin® or Aquaphor®.  Always protect your treated skin when outdoors by wearing protective clothing and applying sunscreen SPF 15 or higher at least 30 minutes before going outdoors and reapply frequently.    ACTIVITY  Fatigue is a normal side effect of radiation therapy and should improve over time.  Alternate rest and activity.  Exercise such as walking may help to improve your fatigue.    FOLLOW-UP  Continue follow-up appointments with all other doctors as necessary.  Call your radiation oncology doctor if you are concerned with any side effects you are experiencing.    WHEN TO CALL YOUR RADIATION ONCOLOGIST OR NURSE: [phone]  You have a fever of 100.4 OF or higher.  You have chills.  Your pain or discomfort is getting worse.  Your skin in the treatment area is getting more red or swollen, feels hard or hot, has a rash or blisters, and/or is itchy.  You see drainage (liquid) coming from your skin in the treatment area.  You see any new open areas (wounds) or changes to your skin.  You have any questions or concerns.  _______________________________________________________________________    Completed by: Amie Daigle MA, Radiation Oncology Medical Assistant on 08/09/24 at 09:41 EDT

## 2024-08-12 NOTE — PROGRESS NOTES
Radiation Treatment Summary Note      Patient Name: Goldie Mata  : 1940    Attending Provider: Marshall Talamantes MD      Diagnosis:     ICD-10-CM ICD-9-CM   1. Cancer of lower lobe of left lung  C34.32 162.5       Radiation Start Date: 2024    Radiation Completion Date: 2024      Prescription:     Site: Left Lower Lobe  Laterality: Left  Total Dose: 5000cGy  Dose per Fraction: 1000cGy  Total Fractions: 5  Daily or BID:  QOD  Modality: Photon  Technique: SBRT (2-5fx)  Bolus: No    Final Delivered Dose Deviated From Initially Prescribed Dose: No    Concurrent Chemotherapy: No    Patient Tolerated Treatment Without Unexpected Side Effects/Complications: Yes    ECOG: Fully active, able to carry on all pre-disease performance without restriction = 0    Pain Management Plan: None Indicated/PRN OTC    Follow-Up Plan: 3 months    Imaging Ordered for Follow-Up: Yes, describe: CT Chest        Marshall Talamantes MD

## 2024-08-13 ENCOUNTER — HOSPITAL ENCOUNTER (OUTPATIENT)
Dept: ONCOLOGY | Facility: HOSPITAL | Age: 84
Discharge: HOME | End: 2024-08-13
Admitting: INTERNAL MEDICINE
Payer: MEDICARE

## 2024-08-13 ENCOUNTER — OFFICE VISIT (OUTPATIENT)
Dept: ONCOLOGY | Facility: CLINIC | Age: 84
End: 2024-08-13
Payer: MEDICARE

## 2024-08-13 VITALS
SYSTOLIC BLOOD PRESSURE: 126 MMHG | OXYGEN SATURATION: 95 % | HEIGHT: 61 IN | WEIGHT: 150 LBS | HEART RATE: 57 BPM | BODY MASS INDEX: 28.32 KG/M2 | TEMPERATURE: 98 F | DIASTOLIC BLOOD PRESSURE: 74 MMHG | RESPIRATION RATE: 18 BRPM

## 2024-08-13 DIAGNOSIS — C34.12 MALIGNANT NEOPLASM OF UPPER LOBE OF LEFT LUNG: ICD-10-CM

## 2024-08-13 DIAGNOSIS — C34.12 MALIGNANT NEOPLASM OF UPPER LOBE OF LEFT LUNG: Primary | ICD-10-CM

## 2024-08-13 DIAGNOSIS — Z45.2 ENCOUNTER FOR CARE RELATED TO PORT-A-CATH: Primary | ICD-10-CM

## 2024-08-13 LAB
BASOPHILS # BLD AUTO: 0.02 10*3/MM3 (ref 0–0.2)
BASOPHILS NFR BLD AUTO: 0.4 % (ref 0–1.5)
DEPRECATED RDW RBC AUTO: 44.8 FL (ref 37–54)
EOSINOPHIL # BLD AUTO: 0.21 10*3/MM3 (ref 0–0.4)
EOSINOPHIL NFR BLD AUTO: 3.8 % (ref 0.3–6.2)
ERYTHROCYTE [DISTWIDTH] IN BLOOD BY AUTOMATED COUNT: 13.7 % (ref 12.3–15.4)
HCT VFR BLD AUTO: 39.9 % (ref 34–46.6)
HGB BLD-MCNC: 12.9 G/DL (ref 12–15.9)
LYMPHOCYTES # BLD AUTO: 0.75 10*3/MM3 (ref 0.7–3.1)
LYMPHOCYTES NFR BLD AUTO: 13.7 % (ref 19.6–45.3)
MCH RBC QN AUTO: 29.7 PG (ref 26.6–33)
MCHC RBC AUTO-ENTMCNC: 32.3 G/DL (ref 31.5–35.7)
MCV RBC AUTO: 91.7 FL (ref 79–97)
MONOCYTES # BLD AUTO: 0.52 10*3/MM3 (ref 0.1–0.9)
MONOCYTES NFR BLD AUTO: 9.5 % (ref 5–12)
NEUTROPHILS NFR BLD AUTO: 3.99 10*3/MM3 (ref 1.7–7)
NEUTROPHILS NFR BLD AUTO: 72.6 % (ref 42.7–76)
PLATELET # BLD AUTO: 185 10*3/MM3 (ref 140–450)
PMV BLD AUTO: 9.8 FL (ref 6–12)
RBC # BLD AUTO: 4.35 10*6/MM3 (ref 3.77–5.28)
WBC NRBC COR # BLD AUTO: 5.49 10*3/MM3 (ref 3.4–10.8)

## 2024-08-13 PROCEDURE — 3078F DIAST BP <80 MM HG: CPT | Performed by: INTERNAL MEDICINE

## 2024-08-13 PROCEDURE — 99214 OFFICE O/P EST MOD 30 MIN: CPT | Performed by: INTERNAL MEDICINE

## 2024-08-13 PROCEDURE — 1126F AMNT PAIN NOTED NONE PRSNT: CPT | Performed by: INTERNAL MEDICINE

## 2024-08-13 PROCEDURE — 85025 COMPLETE CBC W/AUTO DIFF WBC: CPT | Performed by: INTERNAL MEDICINE

## 2024-08-13 PROCEDURE — 25010000002 HEPARIN LOCK FLUSH PER 10 UNITS: Performed by: INTERNAL MEDICINE

## 2024-08-13 PROCEDURE — 36591 DRAW BLOOD OFF VENOUS DEVICE: CPT

## 2024-08-13 PROCEDURE — 3074F SYST BP LT 130 MM HG: CPT | Performed by: INTERNAL MEDICINE

## 2024-08-13 RX ORDER — HEPARIN SODIUM (PORCINE) LOCK FLUSH IV SOLN 100 UNIT/ML 100 UNIT/ML
500 SOLUTION INTRAVENOUS AS NEEDED
Status: DISCONTINUED | OUTPATIENT
Start: 2024-08-13 | End: 2024-08-14 | Stop reason: HOSPADM

## 2024-08-13 RX ORDER — SODIUM CHLORIDE 0.9 % (FLUSH) 0.9 %
20 SYRINGE (ML) INJECTION AS NEEDED
Status: DISCONTINUED | OUTPATIENT
Start: 2024-08-13 | End: 2024-08-14 | Stop reason: HOSPADM

## 2024-08-13 RX ADMIN — HEPARIN 500 UNITS: 100 SYRINGE at 09:06

## 2024-08-13 RX ADMIN — Medication 20 ML: at 09:06

## 2024-08-13 NOTE — PROGRESS NOTES
Port accessed for blood collection using sterile technique. Port aspirated and positive blood return noted. 10 ml blood wasted prior to blood for labs being collected. Port flushed with 10 ml NS and heparin 500 units, then de-accessed. Pt tolerated well.

## 2024-08-15 LAB
RAD ONC ARIA COURSE END DATE: NORMAL
RAD ONC ARIA COURSE ID: NORMAL
RAD ONC ARIA COURSE LAST TREATMENT DATE: NORMAL
RAD ONC ARIA COURSE START DATE: NORMAL
RAD ONC ARIA COURSE TREATMENT ELAPSED DAYS: 9
RAD ONC ARIA FIRST TREATMENT DATE: NORMAL
RAD ONC ARIA PLAN FRACTIONS TREATED TO DATE: 5
RAD ONC ARIA PLAN ID: NORMAL
RAD ONC ARIA PLAN NAME: NORMAL
RAD ONC ARIA PLAN PRESCRIBED DOSE PER FRACTION: 10 GY
RAD ONC ARIA PLAN PRIMARY REFERENCE POINT: NORMAL
RAD ONC ARIA PLAN TOTAL FRACTIONS PRESCRIBED: 5
RAD ONC ARIA PLAN TOTAL PRESCRIBED DOSE: 5000 CGY
RAD ONC ARIA REFERENCE POINT DOSAGE GIVEN TO DATE: 50 GY
RAD ONC ARIA REFERENCE POINT ID: NORMAL

## 2024-08-19 PROBLEM — I71.43 INFRARENAL ABDOMINAL AORTIC ANEURYSM (AAA) WITHOUT RUPTURE: Status: ACTIVE | Noted: 2024-08-19

## 2024-08-19 NOTE — PROGRESS NOTES
"Chief Complaint  No chief complaint on file.    Subjective      HPI: Goldie Mata is a 83 y.o. female referred for initial evaluation of AAA.  Has history of stage IIIb adenocarcinoma the left lung with supraclavicular lymph node recurrence postchemotherapy.  Also just no having completed a course of radiation therapy.  Though not specifically stated, it sounds as if the lung cancer is metastatic and not curable.  She is unaware of the history of her aneurysm.  A family member who accompanied her today is very concerned (\"freaked out\") because a number of family members have  of intracranial aneurysms.    Objective   Vital Signs:  There were no vitals taken for this visit.  Estimated body mass index is 28.34 kg/m² as calculated from the following:    Height as of 24: 154.9 cm (61\").    Weight as of 24: 68 kg (150 lb).      Goldie Mata  reports that she has quit smoking. She has never been exposed to tobacco smoke. She has never used smokeless tobacco..     Exam: Elderly woman, no distress.  No AAA palpable.  Palpable pedal pulses.    Personal review of data: Noncontrast CT scan of chest 2024 with no apparent descending thoracic aortic aneurysm.  Ascending aorta measures 37 x 37 mm.  Noncontrast CT scan 2023 demonstrates intact AAA 36 mm, with common iliac arteries measuring 11 and 12 mm, respectively.  Dr. Morrell's progress note 2024 includes note of CT PET scan demonstrating 3.8 cm AAA.  The CT PET also demonstrated a hypermetabolic 1.4 cm nodule in the left upper lung field concerning for malignancy, but the patient declined biopsy.     Assessment and Plan     Diagnoses and all orders for this visit:    1. Infrarenal abdominal aortic aneurysm (AAA) without rupture (Primary)  Comments:  3.8 cm based on CT 2024.    Summary: 83-year-old woman with advanced, recurrent metastatic lung cancer, referred for evaluation of 3.8 cm AAA based on outside images to which I do not have " access.  The patient's aneurysm is small, and given her age and potential for recurrent lung cancer, I doubt whether she will ever come to require repair.  I would imagine given the natural history of aneurysm that if her aneurysm is going to enlarge, it will enlarge over the course of years, and at that time she may be more than 88 years old, if she is still alive.  We would not likely offer her elective repair at that time.  Nevertheless the patient's family member is interested in serial surveillance.  I would not necessarily recommend this but will have her to return again in a year with an abdominal sonogram.    Follow Up     No follow-ups on file.  Patient was given instructions and counseling regarding her condition or for health maintenance advice. Please see specific information pulled into the AVS if appropriate.

## 2024-08-20 ENCOUNTER — OFFICE VISIT (OUTPATIENT)
Age: 84
End: 2024-08-20
Payer: MEDICARE

## 2024-08-20 VITALS
WEIGHT: 150 LBS | HEIGHT: 60 IN | BODY MASS INDEX: 29.45 KG/M2 | DIASTOLIC BLOOD PRESSURE: 54 MMHG | SYSTOLIC BLOOD PRESSURE: 112 MMHG

## 2024-08-20 DIAGNOSIS — I71.43 INFRARENAL ABDOMINAL AORTIC ANEURYSM (AAA) WITHOUT RUPTURE: Primary | ICD-10-CM

## 2024-08-20 PROCEDURE — 3074F SYST BP LT 130 MM HG: CPT | Performed by: SURGERY

## 2024-08-20 PROCEDURE — 3078F DIAST BP <80 MM HG: CPT | Performed by: SURGERY

## 2024-08-20 PROCEDURE — 1159F MED LIST DOCD IN RCRD: CPT | Performed by: SURGERY

## 2024-08-20 PROCEDURE — 1160F RVW MEDS BY RX/DR IN RCRD: CPT | Performed by: SURGERY

## 2024-08-20 PROCEDURE — 99203 OFFICE O/P NEW LOW 30 MIN: CPT | Performed by: SURGERY

## 2024-09-05 NOTE — PROGRESS NOTES
[facility] Radiation Oncology   Follow Up    Chief Complaint  Radiographic Diagnosis of LLL Lung Cancer        Diagnosis: Radiographic Diagnosis of LLL Lung Cancer     Overall Stage IA2     cT1b: 1.4cm on CT Chest  cN0: per CT Chest  cM0: per CT Chest        Radiation Completion Date: 8/9/2024        Prescription:      Site: Left Lower Lobe  Laterality: Left  Total Dose: 5000cGy  Dose per Fraction: 1000cGy  Total Fractions: 5  Daily or BID:  QOD  Modality: Photon  Technique: SBRT (2-5fx)  Bolus: No      Interval History:    Goldie Mata presents for a one month follow up. She was last seen in our office on 08/09/2024 upon completion of radiation therapy treatment.    She follows with Dr. Morrell, and was last seen on 08/13/2024. Their plan is to have repeat CT scans at the end of October 2024, and follow up the first week of December 2024 on 11/04/2024.    The patient denies any new or concerning complaints with regards to her breathing.  She has followed up with vascular surgery regarding her AAA.  They are going to monitor at this time.      Imaging:    No new relevant imaging      Pathology:      No new relevant pathology      Labs:    Lab Results   Component Value Date    CREATININE 1.09 (H) 09/05/2021             Problem List:  Patient Active Problem List   Diagnosis    Abnormal cardiovascular stress test    Congestive heart failure    Coronary artery disease    Diabetes mellitus    Hyperlipidemia    Hypertension    Shortness of breath    ST elevation (STEMI) myocardial infarction    Status post coronary artery stent placement    Encounter for care related to Port-a-Cath    Chest pain    Lung nodule    Hilar lymphadenopathy    Malignant neoplasm of lung    Infrarenal abdominal aortic aneurysm (AAA) without rupture          Medications:  Current Outpatient Medications on File Prior to Visit   Medication Sig Dispense Refill    aspirin 81 MG tablet Take 1 tablet by mouth Daily.      Calcium Carbonate Antacid  "(Antacid Soft Chews) 1177 MG chewable tablet Chew.      Cholecalciferol (Vitamin D3) 50 MCG (2000 UT) capsule Take 1 capsule by mouth 2 (Two) Times a Day. Take 2 capsules daily      citalopram (CeleXA) 10 MG tablet Take 1 tablet by mouth Daily.      Cyanocobalamin 1000 MCG/ML kit Inject  as directed Every 30 (Thirty) Days.      donepezil (ARICEPT) 10 MG tablet Take 1 tablet by mouth Every Evening. 90 tablet 3    lisinopril (PRINIVIL,ZESTRIL) 2.5 MG tablet Take 1 tablet by mouth Daily.      metoprolol tartrate (LOPRESSOR) 25 MG tablet Take 1 tablet by mouth Daily. 90 tablet 3    omeprazole (priLOSEC) 40 MG capsule Take 1 capsule by mouth Daily.      oxybutynin XL (DITROPAN-XL) 5 MG 24 hr tablet Take 1 tablet by mouth Daily.  5    rosuvastatin (CRESTOR) 20 MG tablet Take 1 tablet by mouth Daily.       Current Facility-Administered Medications on File Prior to Visit   Medication Dose Route Frequency Provider Last Rate Last Admin    heparin injection 500 Units  500 Units Intravenous PRN Emelyn Morrell MD   500 Units at 09/09/24 0923    sodium chloride 0.9 % flush 20 mL  20 mL Intravenous PRN Emelyn Morrell MD   20 mL at 09/09/24 0921          Allergies:  Allergies   Allergen Reactions    Contrast Dye (Echo Or Unknown Ct/Mr) Unknown (See Comments)    Hydrocodone-Acetaminophen Unknown (See Comments)    Penicillin G Unknown (See Comments)           Vital Signs:  /71   Pulse 56   Resp 18   Wt 68.5 kg (151 lb)   SpO2 96%   BMI 29.49 kg/m²   Estimated body mass index is 29.49 kg/m² as calculated from the following:    Height as of 8/20/24: 152.4 cm (60\").    Weight as of this encounter: 68.5 kg (151 lb).  Pain Score    09/09/24 0958   PainSc: 0-No pain         ECOG: Fully active, able to carry on all pre-disease performance without restriction = 0    Physical Exam  Vitals reviewed.   Constitutional:       General: She is not in acute distress.     Appearance: Normal appearance.   HENT:      Head: " Normocephalic and atraumatic.   Eyes:      Extraocular Movements: Extraocular movements intact.      Pupils: Pupils are equal, round, and reactive to light.   Pulmonary:      Effort: Pulmonary effort is normal.   Abdominal:      General: Abdomen is flat.      Palpations: Abdomen is soft.   Musculoskeletal:      Cervical back: Normal range of motion.   Skin:     General: Skin is warm and dry.   Neurological:      General: No focal deficit present.      Mental Status: She is alert and oriented to person, place, and time.   Psychiatric:         Mood and Affect: Mood normal.         Behavior: Behavior normal.            Result Review :  The following data was reviewed by: Marshall Talamantes MD on 09/09/2024:  Labs: Last Creatinine              Diagnoses and all orders for this visit:    1. Cancer of lower lobe of left lung (Primary)        Assessment:    Goldie Mata presents for a one month follow up. She was last seen in our office on 08/09/2024 upon completion of radiation therapy treatment.    She follows with Dr. Morrell, and was last seen on 08/13/2024. Their plan is to have repeat CT scans at the end of October 2024, and follow up the first week of December 2024 on 11/04/2024.    The patient denies any new or concerning complaints with regards to her breathing.  She has followed up with vascular surgery regarding her AAA.  They are going to monitor at this time.    Will plan to follow-up with the patient again at her 3-month linda following radiation therapy with a new CT chest.      Plan:    -Follow-up at 3 months post radiation with CT chest       I spent 20 minutes caring for Goldie on this date of service. This time includes time spent by me in the following activities:preparing for the visit, reviewing tests, obtaining and/or reviewing a separately obtained history, documenting information in the medical record, independently interpreting results and communicating that information with the  patient/family/caregiver, and care coordination  Follow Up   No follow-ups on file.  Patient was given instructions and counseling regarding her condition or for health maintenance advice. Please see specific information pulled into the AVS if appropriate.     Marshall Talamantes MD

## 2024-09-09 ENCOUNTER — HOSPITAL ENCOUNTER (OUTPATIENT)
Dept: ONCOLOGY | Facility: HOSPITAL | Age: 84
Discharge: HOME | End: 2024-09-09
Admitting: INTERNAL MEDICINE
Payer: MEDICARE

## 2024-09-09 ENCOUNTER — OFFICE VISIT (OUTPATIENT)
Dept: RADIATION ONCOLOGY | Facility: HOSPITAL | Age: 84
End: 2024-09-09
Payer: MEDICARE

## 2024-09-09 VITALS
SYSTOLIC BLOOD PRESSURE: 123 MMHG | DIASTOLIC BLOOD PRESSURE: 71 MMHG | RESPIRATION RATE: 18 BRPM | HEART RATE: 56 BPM | BODY MASS INDEX: 29.49 KG/M2 | WEIGHT: 151 LBS | OXYGEN SATURATION: 96 %

## 2024-09-09 DIAGNOSIS — C34.32 CANCER OF LOWER LOBE OF LEFT LUNG: Primary | ICD-10-CM

## 2024-09-09 DIAGNOSIS — Z45.2 ENCOUNTER FOR CARE RELATED TO PORT-A-CATH: Primary | ICD-10-CM

## 2024-09-09 PROCEDURE — 25010000002 HEPARIN LOCK FLUSH PER 10 UNITS: Performed by: INTERNAL MEDICINE

## 2024-09-09 PROCEDURE — 99024 POSTOP FOLLOW-UP VISIT: CPT | Performed by: STUDENT IN AN ORGANIZED HEALTH CARE EDUCATION/TRAINING PROGRAM

## 2024-09-09 PROCEDURE — G0463 HOSPITAL OUTPT CLINIC VISIT: HCPCS | Performed by: STUDENT IN AN ORGANIZED HEALTH CARE EDUCATION/TRAINING PROGRAM

## 2024-09-09 PROCEDURE — 96523 IRRIG DRUG DELIVERY DEVICE: CPT

## 2024-09-09 RX ORDER — HEPARIN SODIUM (PORCINE) LOCK FLUSH IV SOLN 100 UNIT/ML 100 UNIT/ML
500 SOLUTION INTRAVENOUS AS NEEDED
Status: DISCONTINUED | OUTPATIENT
Start: 2024-09-09 | End: 2024-09-10 | Stop reason: HOSPADM

## 2024-09-09 RX ORDER — SODIUM CHLORIDE 0.9 % (FLUSH) 0.9 %
20 SYRINGE (ML) INJECTION AS NEEDED
Status: DISCONTINUED | OUTPATIENT
Start: 2024-09-09 | End: 2024-09-10 | Stop reason: HOSPADM

## 2024-09-09 RX ADMIN — HEPARIN 500 UNITS: 100 SYRINGE at 09:23

## 2024-09-09 RX ADMIN — Medication 20 ML: at 09:21

## 2024-10-07 ENCOUNTER — HOSPITAL ENCOUNTER (OUTPATIENT)
Dept: ONCOLOGY | Facility: HOSPITAL | Age: 84
Discharge: HOME | End: 2024-10-07
Admitting: INTERNAL MEDICINE
Payer: MEDICARE

## 2024-10-07 DIAGNOSIS — Z45.2 ENCOUNTER FOR CARE RELATED TO PORT-A-CATH: Primary | ICD-10-CM

## 2024-10-07 PROCEDURE — 96523 IRRIG DRUG DELIVERY DEVICE: CPT

## 2024-10-07 PROCEDURE — 25010000002 HEPARIN LOCK FLUSH PER 10 UNITS: Performed by: INTERNAL MEDICINE

## 2024-10-07 RX ORDER — SODIUM CHLORIDE 0.9 % (FLUSH) 0.9 %
20 SYRINGE (ML) INJECTION AS NEEDED
Status: DISCONTINUED | OUTPATIENT
Start: 2024-10-07 | End: 2024-10-08 | Stop reason: HOSPADM

## 2024-10-07 RX ORDER — HEPARIN SODIUM (PORCINE) LOCK FLUSH IV SOLN 100 UNIT/ML 100 UNIT/ML
500 SOLUTION INTRAVENOUS AS NEEDED
Status: DISCONTINUED | OUTPATIENT
Start: 2024-10-07 | End: 2024-10-08 | Stop reason: HOSPADM

## 2024-10-07 RX ADMIN — HEPARIN 500 UNITS: 100 SYRINGE at 09:46

## 2024-10-07 RX ADMIN — Medication 20 ML: at 09:44

## 2024-10-29 NOTE — PROGRESS NOTES
[facility] Radiation Oncology   Follow Up    Chief Complaint  Radiographic Diagnosis of LLL Lung Cancer        Diagnosis: Radiographic Diagnosis of LLL Lung Cancer     Overall Stage IA2     cT1b: 1.4cm on CT Chest  cN0: per CT Chest  cM0: per CT Chest           Radiation Completion Date: 8/9/2024        Prescription:      Site: Left Lower Lobe  Laterality: Left  Total Dose: 5000cGy  Dose per Fraction: 1000cGy  Total Fractions: 5  Daily or BID:  QOD  Modality: Photon  Technique: SBRT (2-5fx)  Bolus: No        Interval History:    Goldie Mata presents for a 2 month CT follow up. She was last seen in our office on 09/09/2024. The plan was to repeat imaging at the end of October 2024, and follow up here the first week of November 2024. She follows with Dr. Morrell who saw her earlier today. She has no new or concerning pulmonary complaints.       Imaging:      CT Chest 10/28/2024          Pathology:      No new relevant pathology      Labs:    Lab Results   Component Value Date    CREATININE 1.09 (H) 09/05/2021             Problem List:  Patient Active Problem List   Diagnosis    Abnormal cardiovascular stress test    Congestive heart failure    Coronary artery disease    Diabetes mellitus    Hyperlipidemia    Hypertension    Shortness of breath    ST elevation (STEMI) myocardial infarction    Status post coronary artery stent placement    Encounter for care related to Port-a-Cath    Chest pain    Lung nodule    Hilar lymphadenopathy    Malignant neoplasm of lung    Infrarenal abdominal aortic aneurysm (AAA) without rupture          Medications:  Current Outpatient Medications on File Prior to Visit   Medication Sig Dispense Refill    aspirin 81 MG tablet Take 1 tablet by mouth Daily.      Calcium Carbonate Antacid (Antacid Soft Chews) 1177 MG chewable tablet Chew.      Cholecalciferol (Vitamin D3) 50 MCG (2000 UT) capsule Take 1 capsule by mouth 2 (Two) Times a Day. Take 2 capsules daily      citalopram (CeleXA) 10 MG  "tablet Take 1 tablet by mouth Daily.      Cyanocobalamin 1000 MCG/ML kit Inject  as directed Every 30 (Thirty) Days.      donepezil (ARICEPT) 10 MG tablet Take 1 tablet by mouth Every Evening. 90 tablet 3    lisinopril (PRINIVIL,ZESTRIL) 2.5 MG tablet Take 1 tablet by mouth Daily.      metoprolol tartrate (LOPRESSOR) 25 MG tablet Take 1 tablet by mouth Daily. 90 tablet 3    omeprazole (priLOSEC) 40 MG capsule Take 1 capsule by mouth Daily.      oxybutynin XL (DITROPAN-XL) 5 MG 24 hr tablet Take 1 tablet by mouth Daily.  5    rosuvastatin (CRESTOR) 20 MG tablet Take 1 tablet by mouth Daily.       Current Facility-Administered Medications on File Prior to Visit   Medication Dose Route Frequency Provider Last Rate Last Admin    heparin injection 500 Units  500 Units Intravenous PRN Emelyn Morrell MD   500 Units at 11/04/24 1156    sodium chloride 0.9 % flush 20 mL  20 mL Intravenous PRN Emelyn Morrell MD   10 mL at 11/04/24 1156          Allergies:  Allergies   Allergen Reactions    Contrast Dye (Echo Or Unknown Ct/Mr) Unknown (See Comments)    Hydrocodone-Acetaminophen Unknown (See Comments)    Penicillin G Unknown (See Comments)         Vital Signs:  /63   Pulse 61   Temp 97.6 °F (36.4 °C) (Infrared)   Resp 18   Ht 152.4 cm (60\")   Wt 67.1 kg (148 lb)   SpO2 91%   BMI 28.90 kg/m²   Estimated body mass index is 28.9 kg/m² as calculated from the following:    Height as of this encounter: 152.4 cm (60\").    Weight as of this encounter: 67.1 kg (148 lb).  Pain Score    11/04/24 1426   PainSc: 0-No pain         ECOG: Restricted in physically strenuous activity but ambulatory and able to carry out work of a light or sedentary nature, e.g., light house work, office work = 1    Physical Exam  Vitals reviewed.   Constitutional:       General: She is not in acute distress.     Appearance: Normal appearance.   HENT:      Head: Normocephalic and atraumatic.   Eyes:      Extraocular Movements: " Extraocular movements intact.      Pupils: Pupils are equal, round, and reactive to light.   Pulmonary:      Effort: Pulmonary effort is normal.   Abdominal:      General: Abdomen is flat.      Palpations: Abdomen is soft.   Musculoskeletal:      Cervical back: Normal range of motion.   Skin:     General: Skin is warm and dry.   Neurological:      General: No focal deficit present.      Mental Status: She is alert and oriented to person, place, and time.   Psychiatric:         Mood and Affect: Mood normal.         Behavior: Behavior normal.          Result Review :  The following data was reviewed by: Marshall Talamantes MD on 11/04/2024:  Labs: Last Creatinine   Data reviewed : Radiologic studies CT Chest             Diagnoses and all orders for this visit:    1. Cancer of lower lobe of left lung (Primary)        Assessment:    Goldie Mata presents for a 2 month CT follow up. She was last seen in our office on 09/09/2024. The plan was to repeat imaging at the end of October 2024, and follow up here the first week of November 2024. She follows with Dr. Morrell who saw her earlier today. She has no new or concerning pulmonary complaints.     I met with the patient and reviewed the results of her most recent CT chest in detail.  Overall, I do not agree with the findings and her radiology report.  I do think she has had some reduction in size of her treated lesion, but much more importantly I think she has had progression in the more inferior cavitary nodule which we have been observing.  I reviewed the case with Dr. Olivas with radiology and he was in agreement.  I notified Dr. Morrell and Dr. Camacho.  Will order PET/CT to evaluate.      Plan:    -Plan for PET/CT due to concern for progressing disease in the left lower lobe inferior to her prior radiation treatment in a cavitary lesion that had been under observation.        I spent 40 minutes caring for Goldie on this date of service. This time includes time spent by me in  the following activities:preparing for the visit, reviewing tests, obtaining and/or reviewing a separately obtained history, documenting information in the medical record, independently interpreting results and communicating that information with the patient/family/caregiver, and care coordination  Follow Up   No follow-ups on file.  Patient was given instructions and counseling regarding her condition or for health maintenance advice. Please see specific information pulled into the AVS if appropriate.     Marshall Talamantes MD

## 2024-11-04 ENCOUNTER — HOSPITAL ENCOUNTER (OUTPATIENT)
Dept: ONCOLOGY | Facility: HOSPITAL | Age: 84
Discharge: HOME | End: 2024-11-04
Admitting: INTERNAL MEDICINE
Payer: MEDICARE

## 2024-11-04 ENCOUNTER — OFFICE VISIT (OUTPATIENT)
Dept: ONCOLOGY | Facility: CLINIC | Age: 84
End: 2024-11-04
Payer: MEDICARE

## 2024-11-04 ENCOUNTER — OFFICE VISIT (OUTPATIENT)
Dept: RADIATION ONCOLOGY | Facility: HOSPITAL | Age: 84
End: 2024-11-04
Payer: MEDICARE

## 2024-11-04 VITALS
SYSTOLIC BLOOD PRESSURE: 121 MMHG | TEMPERATURE: 97.6 F | BODY MASS INDEX: 29.06 KG/M2 | DIASTOLIC BLOOD PRESSURE: 63 MMHG | HEIGHT: 60 IN | HEART RATE: 61 BPM | RESPIRATION RATE: 18 BRPM | OXYGEN SATURATION: 91 % | WEIGHT: 148 LBS

## 2024-11-04 VITALS
WEIGHT: 148 LBS | SYSTOLIC BLOOD PRESSURE: 121 MMHG | DIASTOLIC BLOOD PRESSURE: 63 MMHG | TEMPERATURE: 97.6 F | OXYGEN SATURATION: 91 % | HEART RATE: 61 BPM | BODY MASS INDEX: 29.06 KG/M2 | HEIGHT: 60 IN | RESPIRATION RATE: 18 BRPM

## 2024-11-04 DIAGNOSIS — C34.12 MALIGNANT NEOPLASM OF UPPER LOBE OF LEFT LUNG: ICD-10-CM

## 2024-11-04 DIAGNOSIS — Z45.2 ENCOUNTER FOR CARE RELATED TO PORT-A-CATH: Primary | ICD-10-CM

## 2024-11-04 DIAGNOSIS — C34.12 MALIGNANT NEOPLASM OF UPPER LOBE OF LEFT LUNG: Primary | ICD-10-CM

## 2024-11-04 DIAGNOSIS — C34.32 CANCER OF LOWER LOBE OF LEFT LUNG: Primary | ICD-10-CM

## 2024-11-04 LAB
BASOPHILS # BLD AUTO: 0.02 10*3/MM3 (ref 0–0.2)
BASOPHILS NFR BLD AUTO: 0.3 % (ref 0–1.5)
DEPRECATED RDW RBC AUTO: 46.2 FL (ref 37–54)
EOSINOPHIL # BLD AUTO: 0.14 10*3/MM3 (ref 0–0.4)
EOSINOPHIL NFR BLD AUTO: 2.3 % (ref 0.3–6.2)
ERYTHROCYTE [DISTWIDTH] IN BLOOD BY AUTOMATED COUNT: 13.9 % (ref 12.3–15.4)
HCT VFR BLD AUTO: 40.9 % (ref 34–46.6)
HGB BLD-MCNC: 12.9 G/DL (ref 12–15.9)
LYMPHOCYTES # BLD AUTO: 0.89 10*3/MM3 (ref 0.7–3.1)
LYMPHOCYTES NFR BLD AUTO: 14.6 % (ref 19.6–45.3)
MCH RBC QN AUTO: 29.7 PG (ref 26.6–33)
MCHC RBC AUTO-ENTMCNC: 31.5 G/DL (ref 31.5–35.7)
MCV RBC AUTO: 94.2 FL (ref 79–97)
MONOCYTES # BLD AUTO: 0.61 10*3/MM3 (ref 0.1–0.9)
MONOCYTES NFR BLD AUTO: 10 % (ref 5–12)
NEUTROPHILS NFR BLD AUTO: 4.44 10*3/MM3 (ref 1.7–7)
NEUTROPHILS NFR BLD AUTO: 72.8 % (ref 42.7–76)
PLATELET # BLD AUTO: 186 10*3/MM3 (ref 140–450)
PMV BLD AUTO: 10 FL (ref 6–12)
RBC # BLD AUTO: 4.34 10*6/MM3 (ref 3.77–5.28)
WBC NRBC COR # BLD AUTO: 6.1 10*3/MM3 (ref 3.4–10.8)

## 2024-11-04 PROCEDURE — 85025 COMPLETE CBC W/AUTO DIFF WBC: CPT | Performed by: INTERNAL MEDICINE

## 2024-11-04 PROCEDURE — 36591 DRAW BLOOD OFF VENOUS DEVICE: CPT

## 2024-11-04 PROCEDURE — 25010000002 HEPARIN LOCK FLUSH PER 10 UNITS: Performed by: INTERNAL MEDICINE

## 2024-11-04 PROCEDURE — G0463 HOSPITAL OUTPT CLINIC VISIT: HCPCS | Performed by: STUDENT IN AN ORGANIZED HEALTH CARE EDUCATION/TRAINING PROGRAM

## 2024-11-04 RX ORDER — HEPARIN SODIUM (PORCINE) LOCK FLUSH IV SOLN 100 UNIT/ML 100 UNIT/ML
500 SOLUTION INTRAVENOUS AS NEEDED
Status: DISCONTINUED | OUTPATIENT
Start: 2024-11-04 | End: 2024-11-05 | Stop reason: HOSPADM

## 2024-11-04 RX ORDER — SODIUM CHLORIDE 0.9 % (FLUSH) 0.9 %
20 SYRINGE (ML) INJECTION AS NEEDED
Status: DISCONTINUED | OUTPATIENT
Start: 2024-11-04 | End: 2024-11-05 | Stop reason: HOSPADM

## 2024-11-04 RX ADMIN — HEPARIN 500 UNITS: 100 SYRINGE at 11:56

## 2024-11-04 RX ADMIN — Medication 10 ML: at 11:56

## 2024-11-04 NOTE — PROGRESS NOTES
Port accessed and flushed with good blood return noted. 10cc of blood wasted prior to specimen collection. Blood specimen obtained and sent to lab for processing per protocol.  Port flushed with saline and heparin prior to needle removal.  Patient sent to lobby to await Md.

## 2024-11-04 NOTE — PROGRESS NOTES
Hematology/Oncology Outpatient Follow Up    PATIENT NAME:Goldie Mata  :1940  MRN: [id number]  PRIMARY CARE PHYSICIAN: Olman Hatch PA-C  REFERRING PHYSICIAN: Olman Hatch PA-C    Chief Complaint   Patient presents with    Follow-up     Malignant neoplasm of upper lobe of left lung              HISTORY OF PRESENT ILLNESS:   Transfer from Dr. 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  She received his carboplatinum and Taxotere for total of 6 cycles      she had radiation treatment to the chest from 2009 to 10/12/2009     2010 following left supraclavicular relapse patient received chemotherapy with cisplatin and Navelbin.  Not sure if she received XRT at that time     She developed left breast cancer back in  for which she had mastectomy followed by [facility] chemotherapy for 6 months.  There was no hormonal therapy or radiation treatment recommended     In  she developed colonic cancer.  Her last surveillance colonoscopy was in 2019     Patient is due to have a colonoscopy in            Patient  used to smoke   She does not drink alcohol  She lives alone and able to carry out ADLs  She was exposed to chemicals        Family history of cancer with 2 sisters with ovarian and brain cancer in their 60s    May 6, 2024 patient had CT scan of the chest which basically showed slightly larger now completely solid segment left lower lobe pulmonary nodule stable at the 7 smaller nodule.  2024 patient had a PET CT scan to further evaluate the enlarging left lower lobe nodule.  This  showed increased metabolic activity in the 1.4 cm nodule located in the superior segment of the left lower lobe SUV 4.5 cc concerning for malignancy and biopsy has been recommended.  There is a small cavitary lesion in the posterior basilar segments of the left lower lobe measuring 1 cm may be scar tissue atelectasis or malignant.  SUV was 3.64 infrarenal abdominal aortic aneurysm measuring 3.8 cm slightly increased from previous PET scan where was 3.5 cm  Patient was referred to Dr. Camacho.  She is declining biopsy of the left lung nodule  Patient completed SRS to lung mass August 2024.  She is scheduled for follow-up CT chest in October 2024  Past Medical History:   Diagnosis Date    Breast cancer     CHF (congestive heart failure)     Colon cancer     Coronary artery disease     Hyperlipidemia     Hypertension     Lung cancer     Myocardial infarction        Past Surgical History:   Procedure Laterality Date    BRONCHOSCOPY N/A 07/21/2023    Procedure: BRONCHOSCOPY WITH BRONCHOALVEOLAR LAVAGE AND ENDOBRONCHIAL ULTRASOUND WITH FINE NEEDLE ASPIRATION;  Surgeon: Carolyn Sierra MD;  Location: [facility] ENDOSCOPY;  Service: Pulmonary;  Laterality: N/A;    COLON SURGERY      CORONARY ANGIOPLASTY      greater than 5 years ago from      2023    HYSTERECTOMY      MASTECTOMY           Current Outpatient Medications:     aspirin 81 MG tablet, Take 1 tablet by mouth Daily., Disp: , Rfl:     Calcium Carbonate Antacid (Antacid Soft Chews) 1177 MG chewable tablet, Chew., Disp: , Rfl:     Cholecalciferol (Vitamin D3) 50 MCG (2000 UT) capsule, Take 1 capsule by mouth 2 (Two) Times a Day. Take 2 capsules daily, Disp: , Rfl:     citalopram (CeleXA) 10 MG tablet, Take 1 tablet by mouth Daily., Disp: , Rfl:     Cyanocobalamin 1000 MCG/ML kit, Inject  as directed Every 30 (Thirty) Days., Disp: , Rfl:     donepezil (ARICEPT) 10 MG tablet, Take 1 tablet by mouth Every Evening., Disp: 90 tablet, Rfl: 3    lisinopril (PRINIVIL,ZESTRIL) 2.5 MG  tablet, Take 1 tablet by mouth Daily., Disp: , Rfl:     metoprolol tartrate (LOPRESSOR) 25 MG tablet, Take 1 tablet by mouth Daily., Disp: 90 tablet, Rfl: 3    omeprazole (priLOSEC) 40 MG capsule, Take 1 capsule by mouth Daily., Disp: , Rfl:     oxybutynin XL (DITROPAN-XL) 5 MG 24 hr tablet, Take 1 tablet by mouth Daily., Disp: , Rfl: 5    rosuvastatin (CRESTOR) 20 MG tablet, Take 1 tablet by mouth Daily., Disp: , Rfl:   No current facility-administered medications for this visit.    Facility-Administered Medications Ordered in Other Visits:     heparin injection 500 Units, 500 Units, Intravenous, PRN, Emelyn Morrell MD, 500 Units at 11/04/24 1156    sodium chloride 0.9 % flush 20 mL, 20 mL, Intravenous, PRN, Emelyn Morrell MD, 10 mL at 11/04/24 1156    Allergies   Allergen Reactions    Contrast Dye (Echo Or Unknown Ct/Mr) Unknown (See Comments)    Hydrocodone-Acetaminophen Unknown (See Comments)    Penicillin G Unknown (See Comments)       Family History   Problem Relation Age of Onset    Heart disease Mother     Heart disease Father     Colon cancer Sister     Heart disease Sister        Cancer-related family history includes Colon cancer in her sister.    Social History     Tobacco Use    Smoking status: Former     Passive exposure: Never    Smokeless tobacco: Never   Vaping Use    Vaping status: Never Used   Substance Use Topics    Alcohol use: Not Currently    Drug use: Never       I have reviewed and confirmed the accuracy of the patient's history: Chief complaint, HPI, ROS, and Subjective as entered by the MA/LPN/RN. Emelyn Morrell MD 11/04/24      SUBJECTIVE:     Patient is here for fu. Denies any new issues today    REVIEW OF SYSTEMS:  Review of Systems   Constitutional:  Negative for chills, fatigue and fever.   HENT:  Negative for congestion, drooling, ear discharge, rhinorrhea, sinus pressure and tinnitus.    Eyes:  Negative for photophobia, pain and discharge.   Respiratory:   "Negative for apnea, choking and stridor.    Cardiovascular:  Negative for palpitations.   Gastrointestinal:  Negative for abdominal distention, abdominal pain and anal bleeding.   Endocrine: Negative for polydipsia and polyphagia.   Genitourinary:  Negative for decreased urine volume, flank pain and genital sores.   Musculoskeletal:  Negative for gait problem, neck pain and neck stiffness.   Skin:  Negative for color change, rash and wound.   Neurological:  Negative for tremors, seizures, syncope, facial asymmetry and speech difficulty.   Hematological:  Negative for adenopathy.   Psychiatric/Behavioral:  Negative for agitation, confusion, hallucinations and self-injury. The patient is not hyperactive.        Negative    OBJECTIVE:    Vitals:    11/04/24 1309   BP: 121/63   Pulse: 61   Resp: 18   Temp: 97.6 °F (36.4 °C)   TempSrc: Infrared   SpO2: 91%   Weight: 67.1 kg (148 lb)   Height: 152.4 cm (60\")   PainSc: 0-No pain         Body mass index is 28.9 kg/m².    ECOG  (1) Restricted in physically strenuous activity, ambulatory and able to do work of light nature    Physical Exam  Vitals and nursing note reviewed.   Constitutional:       General: She is not in acute distress.     Appearance: She is not diaphoretic.   HENT:      Head: Normocephalic and atraumatic.   Eyes:      General: No scleral icterus.        Right eye: No discharge.         Left eye: No discharge.      Conjunctiva/sclera: Conjunctivae normal.   Neck:      Thyroid: No thyromegaly.   Cardiovascular:      Rate and Rhythm: Normal rate and regular rhythm.      Heart sounds: Normal heart sounds.      No friction rub. No gallop.   Pulmonary:      Effort: Pulmonary effort is normal. No respiratory distress.      Breath sounds: No stridor. No wheezing.   Abdominal:      General: Bowel sounds are normal.      Palpations: Abdomen is soft. There is no mass.      Tenderness: There is no abdominal tenderness. There is no guarding or rebound.   Musculoskeletal: "         General: No tenderness. Normal range of motion.      Cervical back: Normal range of motion and neck supple.   Lymphadenopathy:      Cervical: No cervical adenopathy.   Skin:     General: Skin is warm.      Findings: No erythema or rash.   Neurological:      Mental Status: She is alert and oriented to person, place, and time.      Motor: No abnormal muscle tone.   Psychiatric:         Behavior: Behavior normal.       I have reexamined the patient and the results are consistent with the previously documented exam. Emelynmagalis Morrell MD        RECENT LABS  WBC   Date Value Ref Range Status   11/04/2024 6.10 3.40 - 10.80 10*3/mm3 Final     RBC   Date Value Ref Range Status   11/04/2024 4.34 3.77 - 5.28 10*6/mm3 Final     Hemoglobin   Date Value Ref Range Status   11/04/2024 12.9 12.0 - 15.9 g/dL Final     Hematocrit   Date Value Ref Range Status   11/04/2024 40.9 34.0 - 46.6 % Final     MCV   Date Value Ref Range Status   11/04/2024 94.2 79.0 - 97.0 fL Final     MCH   Date Value Ref Range Status   11/04/2024 29.7 26.6 - 33.0 pg Final     MCHC   Date Value Ref Range Status   11/04/2024 31.5 31.5 - 35.7 g/dL Final     RDW   Date Value Ref Range Status   11/04/2024 13.9 12.3 - 15.4 % Final     RDW-SD   Date Value Ref Range Status   11/04/2024 46.2 37.0 - 54.0 fl Final     MPV   Date Value Ref Range Status   11/04/2024 10.0 6.0 - 12.0 fL Final     Platelets   Date Value Ref Range Status   11/04/2024 186 140 - 450 10*3/mm3 Final     Neutrophil %   Date Value Ref Range Status   11/04/2024 72.8 42.7 - 76.0 % Final     Lymphocyte %   Date Value Ref Range Status   11/04/2024 14.6 (L) 19.6 - 45.3 % Final     Monocyte %   Date Value Ref Range Status   11/04/2024 10.0 5.0 - 12.0 % Final     Eosinophil %   Date Value Ref Range Status   11/04/2024 2.3 0.3 - 6.2 % Final     Basophil %   Date Value Ref Range Status   11/04/2024 0.3 0.0 - 1.5 % Final     Immature Grans %   Date Value Ref Range Status   11/25/2019 0.2 0.0 -  0.5 % Final     Neutrophils, Absolute   Date Value Ref Range Status   11/04/2024 4.44 1.70 - 7.00 10*3/mm3 Final     Lymphocytes, Absolute   Date Value Ref Range Status   11/04/2024 0.89 0.70 - 3.10 10*3/mm3 Final     Monocytes, Absolute   Date Value Ref Range Status   11/04/2024 0.61 0.10 - 0.90 10*3/mm3 Final     Eosinophils, Absolute   Date Value Ref Range Status   11/04/2024 0.14 0.00 - 0.40 10*3/mm3 Final     Basophils, Absolute   Date Value Ref Range Status   11/04/2024 0.02 0.00 - 0.20 10*3/mm3 Final     Immature Grans, Absolute   Date Value Ref Range Status   11/25/2019 0.01 0.00 - 0.05 10*3/mm3 Final     nRBC   Date Value Ref Range Status   07/21/2023 0.0 0.0 - 0.2 /100 WBC Final       Lab Results   Component Value Date    GLUCOSE 119 (H) 09/05/2021    BUN 15 09/05/2021    CREATININE 1.09 (H) 09/05/2021    EGFRIFNONA 48 (L) 09/05/2021    BCR 13.8 09/05/2021    K 4.0 09/05/2021    CO2 26.0 09/05/2021    CALCIUM 9.0 09/05/2021    ALBUMIN 3.5 01/07/2019    LABIL2 1.5 01/07/2019    AST 22 01/07/2019    ALT 11 (L) 01/07/2019         Assessment & Plan     Malignant neoplasm of upper lobe of left lung  - CBC & Differential  - CT Chest Without Contrast          ASSESSMENT:    Malignant neoplasm of upper lobe of left lung  - CBC & Differential        Enlarging left lower lobe pulmonary nodule measuring 1.4 cm, PET avid suspicious for malignancy.  Small cavitary lesion posterior basilar segment of the left lower lobe 1 cm may be malignant inflammatory or infection. Per radiologist stable. Pet has been ordered by Dr Talamantes. Status post SRS to left lower lung nodule  Patient declines biopsy of the nodule  Status post SRS to lung mass  Enlarging aneurysm: Refer to Dr. Andrade.  She was referred to vascular  History of non small cell lung cancer stage IIIb approximately 2009  Breast cancer in 1989 status post left mastectomy no adjuvant chemotherapy or radiation recommended  Colon cancer status post colon resection followed  by chemotherapy in her 40s: 1988  Family history of cancer as listed above   Continue monthly port maintenance           Plans           Will await  results of PET/CT scan, if no signs of progression on surveillance CT scans          Give patient information on cancer genetics to review.     Schedule patient for unilateral right screening mammogram which was completed 6/28/2024 normal    Continue monthly port flushes      FU in 3 months             Electronically signed by Emelyn Morrell MD, 11/04/24, 5:48 PM EST.

## 2024-11-21 NOTE — PROGRESS NOTES
[facility] Radiation Oncology   Follow Up    Chief Complaint  Radiographic Diagnosis of LLL Lung Cancer        Diagnosis: Radiographic Diagnosis of LLL Lung Cancer     Overall Stage IA2     cT1b: 1.4cm on CT Chest  cN0: per CT Chest  cM0: per CT Chest           Radiation Completion Date: 8/9/2024        Prescription:      Site: Left Lower Lobe  Laterality: Left  Total Dose: 5000cGy  Dose per Fraction: 1000cGy  Total Fractions: 5  Daily or BID:  QOD  Modality: Photon  Technique: SBRT (2-5fx)  Bolus: No      Interval History:    Goldie Mata presents for a PET/CT follow up. She was last seen in our office on 11/04/2024. The plan was to have PET/CT due to concern for progressing disease in the left lower lobe inferior to her prior radiation treatment. She follows with Dr. Morrell, and was last seen on 11/04/2024. Their plan is to await PET/CT scan, and follow up in 3 months or earlier as needed.      Imaging:      PET CT 11/25/2024    Impression:  1.The thick-walled cavitary lesion in the posterior left lower lobe has increased in size since the prior PET/CT, and remains hypermetabolic. This is worrisome for progressive malignancy.  2.The 1.4 cm nodule in the superior segment left lower lobe has become less dense and smaller, without FDG uptake, suggesting positive interval response to therapy since the prior PET/CT.  3.Unchanged elevated metabolic activity within the left hilum. Evaluation of hilar structures or potential adenopathy is limited without the benefit of IV contrast  4.There is no convincing evidence of metastatic disease elsewhere within the neck, chest, abdomen, pelvis, or skeleton.        Pathology:      No new relevant pathology      Labs:    Lab Results   Component Value Date    CREATININE 1.09 (H) 09/05/2021             Problem List:  Patient Active Problem List   Diagnosis    Abnormal cardiovascular stress test    Congestive heart failure    Coronary artery disease    Diabetes mellitus     Hyperlipidemia    Hypertension    Shortness of breath    ST elevation (STEMI) myocardial infarction    Status post coronary artery stent placement    Encounter for care related to Port-a-Cath    Chest pain    Lung nodule    Hilar lymphadenopathy    Malignant neoplasm of lung    Infrarenal abdominal aortic aneurysm (AAA) without rupture          Medications:  Current Outpatient Medications on File Prior to Visit   Medication Sig Dispense Refill    aspirin 81 MG tablet Take 1 tablet by mouth Daily.      Calcium Carbonate Antacid (Antacid Soft Chews) 1177 MG chewable tablet Chew.      Cholecalciferol (Vitamin D3) 50 MCG (2000 UT) capsule Take 1 capsule by mouth 2 (Two) Times a Day. Take 2 capsules daily      citalopram (CeleXA) 10 MG tablet Take 1 tablet by mouth Daily.      Cyanocobalamin 1000 MCG/ML kit Inject  as directed Every 30 (Thirty) Days.      donepezil (ARICEPT) 10 MG tablet Take 1 tablet by mouth Every Evening. 90 tablet 3    lisinopril (PRINIVIL,ZESTRIL) 2.5 MG tablet Take 1 tablet by mouth Daily.      metoprolol tartrate (LOPRESSOR) 25 MG tablet Take 1 tablet by mouth Daily. 90 tablet 3    omeprazole (priLOSEC) 40 MG capsule Take 1 capsule by mouth Daily.      oxybutynin XL (DITROPAN-XL) 5 MG 24 hr tablet Take 1 tablet by mouth Daily.  5    rosuvastatin (CRESTOR) 20 MG tablet Take 1 tablet by mouth Daily.       Current Facility-Administered Medications on File Prior to Visit   Medication Dose Route Frequency Provider Last Rate Last Admin    heparin injection 500 Units  500 Units Intravenous PRN Emelyn Morrell MD   500 Units at 12/02/24 0811    sodium chloride 0.9 % flush 20 mL  20 mL Intravenous PRN Emelyn Morrell MD   20 mL at 12/02/24 0810          Allergies:  Allergies   Allergen Reactions    Contrast Dye (Echo Or Unknown Ct/Mr) Unknown (See Comments)    Hydrocodone-Acetaminophen Unknown (See Comments)    Penicillin G Unknown (See Comments)           Vital Signs:  /64   Pulse  "61   Temp 97.6 °F (36.4 °C) (Temporal)   Resp 20   Wt 68 kg (150 lb)   SpO2 95%   BMI 29.29 kg/m²   Estimated body mass index is 29.29 kg/m² as calculated from the following:    Height as of 11/4/24: 152.4 cm (60\").    Weight as of this encounter: 68 kg (150 lb).  Pain Score    12/02/24 0837   PainSc: 0-No pain         ECOG: Restricted in physically strenuous activity but ambulatory and able to carry out work of a light or sedentary nature, e.g., light house work, office work = 1    Physical Exam  Vitals reviewed.   Constitutional:       General: She is not in acute distress.     Appearance: Normal appearance.   HENT:      Head: Normocephalic and atraumatic.   Eyes:      Extraocular Movements: Extraocular movements intact.      Pupils: Pupils are equal, round, and reactive to light.   Pulmonary:      Effort: Pulmonary effort is normal.   Abdominal:      General: Abdomen is flat.      Palpations: Abdomen is soft.   Musculoskeletal:      Cervical back: Normal range of motion.   Skin:     General: Skin is warm and dry.   Neurological:      General: No focal deficit present.      Mental Status: She is alert and oriented to person, place, and time.   Psychiatric:         Mood and Affect: Mood normal.         Behavior: Behavior normal.            Result Review :  The following data was reviewed by: Marshall Talamantes MD on 12/02/2024:  Labs: Last Creatinine   Data reviewed : Radiologic studies PET CT             Diagnoses and all orders for this visit:    1. Cancer of lower lobe of left lung (Primary)        Assessment:    Goldie Mata presents for a PET/CT follow up. She was last seen in our office on 11/04/2024. The plan was to have PET/CT due to concern for progressing disease in the left lower lobe inferior to her prior radiation treatment. She follows with Dr. Morrell, and was last seen on 11/04/2024. Their plan is to await PET/CT scan, and follow up in 3 months or earlier as needed.    I met with the patient and " discussed the results of her most recent PET CT in detail. I discussed that her inferior left lower lobe lesion is concerning for progressing malignancy. I discussed the risks, benefits, side effects, and logistics of radiation treatment planning and delivery. We will need to pay special attention to her prior radiation fields. I have reached out to Dr. Camacho regarding these findings, but am skeptical that he would want to perform a biopsy in these circumstances. I answered all of the patient's questions to her satisfaction. Consents were signed. Plan on CT simulation later today.       Plan:    -Plan for empiric SBRT to growing LLL radiographically diagnosed lung cancer. Consent signed. CT simulation today.        I spent 40 minutes caring for Goldie on this date of service. This time includes time spent by me in the following activities:preparing for the visit, reviewing tests, obtaining and/or reviewing a separately obtained history, referring and communicating with other health care professionals , documenting information in the medical record, independently interpreting results and communicating that information with the patient/family/caregiver, and care coordination  Follow Up   No follow-ups on file.  Patient was given instructions and counseling regarding her condition or for health maintenance advice. Please see specific information pulled into the AVS if appropriate.     Marshall Talamantes MD

## 2024-11-25 ENCOUNTER — HOSPITAL ENCOUNTER (OUTPATIENT)
Dept: PET IMAGING | Facility: HOSPITAL | Age: 84
Discharge: HOME | End: 2024-11-25
Admitting: STUDENT IN AN ORGANIZED HEALTH CARE EDUCATION/TRAINING PROGRAM
Payer: MEDICARE

## 2024-11-25 DIAGNOSIS — C34.32 CANCER OF LOWER LOBE OF LEFT LUNG: ICD-10-CM

## 2024-11-25 LAB — GLUCOSE BLDC GLUCOMTR-MCNC: 97 MG/DL (ref 70–105)

## 2024-11-25 PROCEDURE — 82948 REAGENT STRIP/BLOOD GLUCOSE: CPT

## 2024-11-25 PROCEDURE — 78815 PET IMAGE W/CT SKULL-THIGH: CPT

## 2024-11-25 PROCEDURE — 34310000005 FLUDEOXYGLUCOSE F18 SOLUTION: Performed by: STUDENT IN AN ORGANIZED HEALTH CARE EDUCATION/TRAINING PROGRAM

## 2024-11-25 PROCEDURE — A9552 F18 FDG: HCPCS | Performed by: STUDENT IN AN ORGANIZED HEALTH CARE EDUCATION/TRAINING PROGRAM

## 2024-11-25 RX ADMIN — FLUDEOXYGLUCOSE F 18 1 DOSE: 200 INJECTION, SOLUTION INTRAVENOUS at 10:48

## 2024-11-27 RX ORDER — SODIUM CHLORIDE 0.9 % (FLUSH) 0.9 %
20 SYRINGE (ML) INJECTION AS NEEDED
Status: CANCELLED | OUTPATIENT
Start: 2024-11-27

## 2024-11-27 RX ORDER — HEPARIN SODIUM (PORCINE) LOCK FLUSH IV SOLN 100 UNIT/ML 100 UNIT/ML
500 SOLUTION INTRAVENOUS AS NEEDED
Status: CANCELLED | OUTPATIENT
Start: 2024-11-27

## 2024-12-02 ENCOUNTER — HOSPITAL ENCOUNTER (OUTPATIENT)
Dept: RADIATION ONCOLOGY | Facility: HOSPITAL | Age: 84
Setting detail: RADIATION/ONCOLOGY SERIES
End: 2024-12-02
Payer: MEDICARE

## 2024-12-02 ENCOUNTER — HOSPITAL ENCOUNTER (OUTPATIENT)
Dept: ONCOLOGY | Facility: HOSPITAL | Age: 84
Discharge: HOME | End: 2024-12-02
Admitting: INTERNAL MEDICINE
Payer: MEDICARE

## 2024-12-02 ENCOUNTER — OFFICE VISIT (OUTPATIENT)
Dept: RADIATION ONCOLOGY | Facility: HOSPITAL | Age: 84
End: 2024-12-02
Payer: MEDICARE

## 2024-12-02 VITALS
OXYGEN SATURATION: 95 % | TEMPERATURE: 97.6 F | RESPIRATION RATE: 20 BRPM | HEART RATE: 61 BPM | BODY MASS INDEX: 29.29 KG/M2 | DIASTOLIC BLOOD PRESSURE: 64 MMHG | WEIGHT: 150 LBS | SYSTOLIC BLOOD PRESSURE: 105 MMHG

## 2024-12-02 DIAGNOSIS — C34.32 CANCER OF LOWER LOBE OF LEFT LUNG: Primary | ICD-10-CM

## 2024-12-02 DIAGNOSIS — Z45.2 ENCOUNTER FOR CARE RELATED TO PORT-A-CATH: Primary | ICD-10-CM

## 2024-12-02 PROCEDURE — 77334 RADIATION TREATMENT AID(S): CPT | Performed by: STUDENT IN AN ORGANIZED HEALTH CARE EDUCATION/TRAINING PROGRAM

## 2024-12-02 PROCEDURE — G0463 HOSPITAL OUTPT CLINIC VISIT: HCPCS | Performed by: STUDENT IN AN ORGANIZED HEALTH CARE EDUCATION/TRAINING PROGRAM

## 2024-12-02 PROCEDURE — 25010000002 HEPARIN LOCK FLUSH PER 10 UNITS: Performed by: INTERNAL MEDICINE

## 2024-12-02 PROCEDURE — 77263 THER RADIOLOGY TX PLNG CPLX: CPT | Performed by: STUDENT IN AN ORGANIZED HEALTH CARE EDUCATION/TRAINING PROGRAM

## 2024-12-02 PROCEDURE — 96523 IRRIG DRUG DELIVERY DEVICE: CPT

## 2024-12-02 RX ORDER — SODIUM CHLORIDE 0.9 % (FLUSH) 0.9 %
20 SYRINGE (ML) INJECTION AS NEEDED
Status: DISCONTINUED | OUTPATIENT
Start: 2024-12-02 | End: 2024-12-03 | Stop reason: HOSPADM

## 2024-12-02 RX ORDER — HEPARIN SODIUM (PORCINE) LOCK FLUSH IV SOLN 100 UNIT/ML 100 UNIT/ML
500 SOLUTION INTRAVENOUS AS NEEDED
OUTPATIENT
Start: 2024-12-02

## 2024-12-02 RX ORDER — SODIUM CHLORIDE 0.9 % (FLUSH) 0.9 %
20 SYRINGE (ML) INJECTION AS NEEDED
OUTPATIENT
Start: 2024-12-02

## 2024-12-02 RX ORDER — HEPARIN SODIUM (PORCINE) LOCK FLUSH IV SOLN 100 UNIT/ML 100 UNIT/ML
500 SOLUTION INTRAVENOUS AS NEEDED
Status: DISCONTINUED | OUTPATIENT
Start: 2024-12-02 | End: 2024-12-03 | Stop reason: HOSPADM

## 2024-12-02 RX ADMIN — HEPARIN 500 UNITS: 100 SYRINGE at 08:11

## 2024-12-02 RX ADMIN — Medication 20 ML: at 08:10

## 2024-12-02 NOTE — PROGRESS NOTES
0810 Port accessed and flushed with good blood return noted.  Port flushed with saline and heparin prior to needle removal.

## 2024-12-06 PROCEDURE — 77300 RADIATION THERAPY DOSE PLAN: CPT | Performed by: STUDENT IN AN ORGANIZED HEALTH CARE EDUCATION/TRAINING PROGRAM

## 2024-12-06 PROCEDURE — 77301 RADIOTHERAPY DOSE PLAN IMRT: CPT | Performed by: STUDENT IN AN ORGANIZED HEALTH CARE EDUCATION/TRAINING PROGRAM

## 2024-12-06 PROCEDURE — 77338 DESIGN MLC DEVICE FOR IMRT: CPT | Performed by: STUDENT IN AN ORGANIZED HEALTH CARE EDUCATION/TRAINING PROGRAM

## 2024-12-09 ENCOUNTER — OFFICE VISIT (OUTPATIENT)
Dept: CARDIOLOGY | Facility: CLINIC | Age: 84
End: 2024-12-09
Payer: MEDICARE

## 2024-12-09 VITALS
HEIGHT: 60 IN | HEART RATE: 67 BPM | WEIGHT: 147 LBS | DIASTOLIC BLOOD PRESSURE: 73 MMHG | OXYGEN SATURATION: 97 % | BODY MASS INDEX: 28.86 KG/M2 | SYSTOLIC BLOOD PRESSURE: 119 MMHG

## 2024-12-09 DIAGNOSIS — E78.00 PURE HYPERCHOLESTEROLEMIA: ICD-10-CM

## 2024-12-09 DIAGNOSIS — I10 ESSENTIAL HYPERTENSION: ICD-10-CM

## 2024-12-09 DIAGNOSIS — I50.22 CHRONIC SYSTOLIC CONGESTIVE HEART FAILURE: ICD-10-CM

## 2024-12-09 DIAGNOSIS — I25.118 CORONARY ARTERY DISEASE OF NATIVE ARTERY OF NATIVE HEART WITH STABLE ANGINA PECTORIS: Primary | ICD-10-CM

## 2024-12-09 NOTE — PROGRESS NOTES
"    Subjective:     Encounter Date:12/09/2024      Patient ID: Goldie Mata is a 84 y.o. female.    Chief Complaint:  History of Present Illness 84-year-old white female with history of coronary disease hypertension hyperlipidemia diabetes and heart failure presents to my office for a follow-up.  Patient is currently stable without any symptoms of chest pain or shortness of breath at rest or exertion.  No grandmas any PND orthopnea.  No palpitations dizziness syncope or swelling of the feet.  She is taking her medicines regularly.  She does not smoke.    The following portions of the patient's history were reviewed and updated as appropriate: allergies, current medications, past family history, past medical history, past social history, past surgical history, and problem list.  Past Medical History:   Diagnosis Date    Breast cancer     CHF (congestive heart failure)     Colon cancer     Coronary artery disease     Hyperlipidemia     Hypertension     Lung cancer     Myocardial infarction      Past Surgical History:   Procedure Laterality Date    BRONCHOSCOPY N/A 07/21/2023    Procedure: BRONCHOSCOPY WITH BRONCHOALVEOLAR LAVAGE AND ENDOBRONCHIAL ULTRASOUND WITH FINE NEEDLE ASPIRATION;  Surgeon: Carolyn Sierra MD;  Location: [facility] ENDOSCOPY;  Service: Pulmonary;  Laterality: N/A;    COLON SURGERY      CORONARY ANGIOPLASTY      greater than 5 years ago from      2023    HYSTERECTOMY      MASTECTOMY       /73   Pulse 67   Ht 152.4 cm (60\")   Wt 66.7 kg (147 lb)   SpO2 97%   BMI 28.71 kg/m²   Family History   Problem Relation Age of Onset    Heart disease Mother     Heart disease Father     Colon cancer Sister     Heart disease Sister        Current Outpatient Medications:     aspirin 81 MG tablet, Take 1 tablet by mouth Daily., Disp: , Rfl:     Calcium Carbonate Antacid (Antacid Soft Chews) 1177 MG chewable tablet, Chew., Disp: , Rfl:     Cholecalciferol (Vitamin D3) 50 MCG (2000 UT) capsule, Take 1 " capsule by mouth 2 (Two) Times a Day. Take 2 capsules daily, Disp: , Rfl:     citalopram (CeleXA) 10 MG tablet, Take 1 tablet by mouth Daily., Disp: , Rfl:     Cyanocobalamin 1000 MCG/ML kit, Inject  as directed Every 30 (Thirty) Days., Disp: , Rfl:     donepezil (ARICEPT) 10 MG tablet, Take 1 tablet by mouth Every Evening., Disp: 90 tablet, Rfl: 3    metoprolol tartrate (LOPRESSOR) 25 MG tablet, Take 1 tablet by mouth Daily., Disp: 90 tablet, Rfl: 3    omeprazole (priLOSEC) 40 MG capsule, Take 1 capsule by mouth Daily., Disp: , Rfl:     oxybutynin XL (DITROPAN-XL) 5 MG 24 hr tablet, Take 1 tablet by mouth Daily., Disp: , Rfl: 5    rosuvastatin (CRESTOR) 20 MG tablet, Take 1 tablet by mouth Daily., Disp: , Rfl:     lisinopril (PRINIVIL,ZESTRIL) 2.5 MG tablet, Take 1 tablet by mouth Daily. (Patient not taking: Reported on 12/9/2024), Disp: , Rfl:   Allergies   Allergen Reactions    Contrast Dye (Echo Or Unknown Ct/Mr) Unknown (See Comments)    Hydrocodone-Acetaminophen Unknown (See Comments)    Penicillin G Unknown (See Comments)     Social History     Socioeconomic History    Marital status:    Tobacco Use    Smoking status: Former     Passive exposure: Never    Smokeless tobacco: Never   Vaping Use    Vaping status: Never Used   Substance and Sexual Activity    Alcohol use: Not Currently    Drug use: Never    Sexual activity: Defer     Review of Systems   Constitutional: Negative for malaise/fatigue.   Cardiovascular:  Negative for chest pain, dyspnea on exertion, leg swelling and palpitations.   Respiratory:  Negative for cough and shortness of breath.    Gastrointestinal:  Negative for abdominal pain, nausea and vomiting.   Neurological:  Negative for dizziness, focal weakness, headaches, light-headedness and numbness.   All other systems reviewed and are negative.             Objective:     Constitutional:       Appearance: Well-developed.   Eyes:      General: No scleral icterus.     Conjunctiva/sclera:  Conjunctivae normal.   HENT:      Head: Normocephalic and atraumatic.   Neck:      Vascular: No carotid bruit or JVD.   Pulmonary:      Effort: Pulmonary effort is normal.      Breath sounds: Normal breath sounds. No wheezing. No rales.   Cardiovascular:      Normal rate. Regular rhythm.   Pulses:     Intact distal pulses.   Abdominal:      General: Bowel sounds are normal.      Palpations: Abdomen is soft.   Musculoskeletal:      Cervical back: Normal range of motion and neck supple. Skin:     General: Skin is warm and dry.      Findings: No rash.   Neurological:      Mental Status: Alert.       Procedures    Lab Review:         MDM    #1 coronary artery disease  Patient had stent placement to the LAD in the past and is currently stable on medical therapy.  2.  HFrEF  Patient has mild LV systolic dysfunction with an EF of 40% and is currently on medications including metoprolol and lisinopril  3.  Hyperlipidemia  Patient is on Crestor and the lipid levels are well within normal limits  4.  Hypertension  Patient blood pressure currently stable on medications.    Patient's previous medical records, labs, and EKG were reviewed and discussed with the patient at today's visit.

## 2024-12-17 ENCOUNTER — RADIATION ONCOLOGY WEEKLY ASSESSMENT (OUTPATIENT)
Dept: RADIATION ONCOLOGY | Facility: HOSPITAL | Age: 84
End: 2024-12-17
Payer: MEDICARE

## 2024-12-17 ENCOUNTER — HOSPITAL ENCOUNTER (OUTPATIENT)
Dept: RADIATION ONCOLOGY | Facility: HOSPITAL | Age: 84
Discharge: HOME | End: 2024-12-17

## 2024-12-17 VITALS
SYSTOLIC BLOOD PRESSURE: 124 MMHG | OXYGEN SATURATION: 95 % | HEART RATE: 58 BPM | DIASTOLIC BLOOD PRESSURE: 70 MMHG | WEIGHT: 147.6 LBS | TEMPERATURE: 97.7 F | HEIGHT: 60 IN | RESPIRATION RATE: 16 BRPM | BODY MASS INDEX: 28.98 KG/M2

## 2024-12-17 DIAGNOSIS — C34.32 CANCER OF LOWER LOBE OF LEFT LUNG: Primary | ICD-10-CM

## 2024-12-17 LAB
RAD ONC ARIA COURSE ID: NORMAL
RAD ONC ARIA COURSE INTENT: NORMAL
RAD ONC ARIA COURSE LAST TREATMENT DATE: NORMAL
RAD ONC ARIA COURSE START DATE: NORMAL
RAD ONC ARIA COURSE TREATMENT ELAPSED DAYS: 0
RAD ONC ARIA FIRST TREATMENT DATE: NORMAL
RAD ONC ARIA PLAN FRACTIONS TREATED TO DATE: 1
RAD ONC ARIA PLAN ID: NORMAL
RAD ONC ARIA PLAN PRESCRIBED DOSE PER FRACTION: 10 GY
RAD ONC ARIA PLAN PRIMARY REFERENCE POINT: NORMAL
RAD ONC ARIA PLAN TOTAL FRACTIONS PRESCRIBED: 5
RAD ONC ARIA PLAN TOTAL PRESCRIBED DOSE: 5000 CGY
RAD ONC ARIA REFERENCE POINT DOSAGE GIVEN TO DATE: 10 GY
RAD ONC ARIA REFERENCE POINT ID: NORMAL
RAD ONC ARIA REFERENCE POINT SESSION DOSAGE GIVEN: 10 GY

## 2024-12-17 PROCEDURE — 77373 STRTCTC BDY RAD THER TX DLVR: CPT | Performed by: STUDENT IN AN ORGANIZED HEALTH CARE EDUCATION/TRAINING PROGRAM

## 2024-12-17 NOTE — PROGRESS NOTES
"Radiation Oncology  On-Treatment Note      Patient: Goldie Mata    MRN: [id number]    Attending Physician: Marshall Talamantes MD     Diagnosis:     ICD-10-CM ICD-9-CM   1. Cancer of lower lobe of left lung  C34.32 162.5       Radiation Therapy Visit:  Continue radiation therapy, Dosimetry plan remains acceptable, Films reviewed and remains acceptable, Pain assessed, Pain management planned, Radiation dose schedule reviewed and remains acceptable, Radiation technique remains acceptable, and Symptoms within expected range    Radiation Treatments       Active   Plans   FB LLL SBRT   Most recent treatment: Dose planned: 1,000 cGy (fraction 1 on 12/17/2024)   Total: Dose planned: 5,000 cGy (5 fractions)   Elapsed Days: 0      Reference Points   LLL SBRT   Most recent treatment: Dose given: 1,000 cGy (on 12/17/2024)   Total: Dose given: 1,000 cGy   Elapsed Days: 0                      Physical Examination:  Vitals: Blood pressure 124/70, pulse 58, temperature 97.7 °F (36.5 °C), temperature source Temporal, resp. rate 16, height 152.4 cm (60\"), weight 67 kg (147 lb 9.6 oz), SpO2 95%.  Pain Score    12/17/24 1449   PainSc: 0-No pain       Restricted in physically strenuous activity but ambulatory and able to carry out work of a light or sedentary nature, e.g., light house work, office work = 1    We examined the relevant areas: yes  Findings are within the expected range for this stage of treatment: yes  -------------------------------------------------------------------------------------------------------------------    ACTION ITEMS:  Patient tolerating treatment well and as expected for this stage in their treatment and Continue radiation therapy as planned    Estimated Completion Date: 12/23/2024    -Follow up in 3 months with CT Chest      Marshall Talamantes MD  Radiation Oncology  "

## 2024-12-19 ENCOUNTER — HOSPITAL ENCOUNTER (OUTPATIENT)
Dept: RADIATION ONCOLOGY | Facility: HOSPITAL | Age: 84
Discharge: HOME | End: 2024-12-19

## 2024-12-19 LAB

## 2024-12-19 PROCEDURE — 77373 STRTCTC BDY RAD THER TX DLVR: CPT | Performed by: STUDENT IN AN ORGANIZED HEALTH CARE EDUCATION/TRAINING PROGRAM

## 2024-12-19 PROCEDURE — 77336 RADIATION PHYSICS CONSULT: CPT | Performed by: STUDENT IN AN ORGANIZED HEALTH CARE EDUCATION/TRAINING PROGRAM

## 2024-12-20 ENCOUNTER — HOSPITAL ENCOUNTER (OUTPATIENT)
Dept: RADIATION ONCOLOGY | Facility: HOSPITAL | Age: 84
Discharge: HOME | End: 2024-12-20

## 2024-12-20 LAB
RAD ONC ARIA COURSE ID: NORMAL
RAD ONC ARIA COURSE INTENT: NORMAL
RAD ONC ARIA COURSE LAST TREATMENT DATE: NORMAL
RAD ONC ARIA COURSE START DATE: NORMAL
RAD ONC ARIA COURSE TREATMENT ELAPSED DAYS: 3
RAD ONC ARIA FIRST TREATMENT DATE: NORMAL
RAD ONC ARIA PLAN FRACTIONS TREATED TO DATE: 3
RAD ONC ARIA PLAN ID: NORMAL
RAD ONC ARIA PLAN PRESCRIBED DOSE PER FRACTION: 10 GY
RAD ONC ARIA PLAN PRIMARY REFERENCE POINT: NORMAL
RAD ONC ARIA PLAN TOTAL FRACTIONS PRESCRIBED: 5
RAD ONC ARIA PLAN TOTAL PRESCRIBED DOSE: 5000 CGY
RAD ONC ARIA REFERENCE POINT DOSAGE GIVEN TO DATE: 30 GY
RAD ONC ARIA REFERENCE POINT ID: NORMAL
RAD ONC ARIA REFERENCE POINT SESSION DOSAGE GIVEN: 10 GY

## 2024-12-20 PROCEDURE — 77373 STRTCTC BDY RAD THER TX DLVR: CPT | Performed by: STUDENT IN AN ORGANIZED HEALTH CARE EDUCATION/TRAINING PROGRAM

## 2024-12-22 ENCOUNTER — HOSPITAL ENCOUNTER (OUTPATIENT)
Dept: RADIATION ONCOLOGY | Facility: HOSPITAL | Age: 84
Discharge: HOME | End: 2024-12-22
Payer: MEDICARE

## 2024-12-22 LAB
RAD ONC ARIA COURSE ID: NORMAL
RAD ONC ARIA COURSE INTENT: NORMAL
RAD ONC ARIA COURSE LAST TREATMENT DATE: NORMAL
RAD ONC ARIA COURSE START DATE: NORMAL
RAD ONC ARIA COURSE TREATMENT ELAPSED DAYS: 5
RAD ONC ARIA FIRST TREATMENT DATE: NORMAL
RAD ONC ARIA PLAN FRACTIONS TREATED TO DATE: 4
RAD ONC ARIA PLAN ID: NORMAL
RAD ONC ARIA PLAN PRESCRIBED DOSE PER FRACTION: 10 GY
RAD ONC ARIA PLAN PRIMARY REFERENCE POINT: NORMAL
RAD ONC ARIA PLAN TOTAL FRACTIONS PRESCRIBED: 5
RAD ONC ARIA PLAN TOTAL PRESCRIBED DOSE: 5000 CGY
RAD ONC ARIA REFERENCE POINT DOSAGE GIVEN TO DATE: 40 GY
RAD ONC ARIA REFERENCE POINT ID: NORMAL
RAD ONC ARIA REFERENCE POINT SESSION DOSAGE GIVEN: 10 GY

## 2024-12-22 PROCEDURE — 77373 STRTCTC BDY RAD THER TX DLVR: CPT | Performed by: STUDENT IN AN ORGANIZED HEALTH CARE EDUCATION/TRAINING PROGRAM

## 2024-12-23 ENCOUNTER — RADIATION ONCOLOGY WEEKLY ASSESSMENT (OUTPATIENT)
Dept: RADIATION ONCOLOGY | Facility: HOSPITAL | Age: 84
End: 2024-12-23
Payer: MEDICARE

## 2024-12-23 ENCOUNTER — TREATMENT (OUTPATIENT)
Dept: RADIATION ONCOLOGY | Facility: HOSPITAL | Age: 84
End: 2024-12-23
Payer: MEDICARE

## 2024-12-23 ENCOUNTER — HOSPITAL ENCOUNTER (OUTPATIENT)
Dept: RADIATION ONCOLOGY | Facility: HOSPITAL | Age: 84
Discharge: HOME | End: 2024-12-23
Payer: MEDICARE

## 2024-12-23 DIAGNOSIS — C34.32 MALIGNANT NEOPLASM OF LOWER LOBE OF LEFT LUNG: Primary | ICD-10-CM

## 2024-12-23 LAB
RAD ONC ARIA COURSE ID: NORMAL
RAD ONC ARIA COURSE INTENT: NORMAL
RAD ONC ARIA COURSE LAST TREATMENT DATE: NORMAL
RAD ONC ARIA COURSE START DATE: NORMAL
RAD ONC ARIA COURSE TREATMENT ELAPSED DAYS: 6
RAD ONC ARIA FIRST TREATMENT DATE: NORMAL
RAD ONC ARIA PLAN FRACTIONS TREATED TO DATE: 5
RAD ONC ARIA PLAN ID: NORMAL
RAD ONC ARIA PLAN PRESCRIBED DOSE PER FRACTION: 10 GY
RAD ONC ARIA PLAN PRIMARY REFERENCE POINT: NORMAL
RAD ONC ARIA PLAN TOTAL FRACTIONS PRESCRIBED: 5
RAD ONC ARIA PLAN TOTAL PRESCRIBED DOSE: 5000 CGY
RAD ONC ARIA REFERENCE POINT DOSAGE GIVEN TO DATE: 50 GY
RAD ONC ARIA REFERENCE POINT ID: NORMAL
RAD ONC ARIA REFERENCE POINT SESSION DOSAGE GIVEN: 10 GY

## 2024-12-23 PROCEDURE — 77373 STRTCTC BDY RAD THER TX DLVR: CPT | Performed by: STUDENT IN AN ORGANIZED HEALTH CARE EDUCATION/TRAINING PROGRAM

## 2024-12-23 NOTE — PATIENT INSTRUCTIONS
RADIATION THERAPY DISCHARGE INSTRUCTIONS  Chest    CONGRATULATIONS! You completed 5 radiation treatments for treatment of your left lower lobe lung cancer. These discharge instructions are important for you to follow until your one-month follow up appointment with your radiation oncologist. Please make sure to review these instructions and call the Radiation Oncology Department if you have any questions or concerns with symptoms you may experience. Thank you for trusting us with your cancer treatment!    DIET  Eat a regular, well balanced diet that is high in protein such as meat, eggs, cheese, and nut butters.  Drink 48 to 64 ounces of fluid daily.  Use nutritional supplements if you are not able to eat full meals.  Monitor your weight and report continued weight loss to your doctor.    MEDICATIONS  Use Tylenol as needed to decrease discomfort and irritation to treatment area.  Take pain medications only as prescribed.  Take a laxative or stool softener as needed to prevent constipation due to pain medications.  (If applicable) Use Rachana's Magic Mouthwash or other oral pain relief medication before meals and before taking medications as needed to ease the discomfort of swallowing.    SKIN CARE  Wash treated skin gently with your hands using a mild, non-drying soap such as Dove® or Aveeno® until skin returns to normal.  Pat skin dry - do not rub.  Keep treated skin moist with twice daily applications of Eucerin® or Aquaphor®.  Always protect your treated skin when outdoors by wearing protective clothing and applying sunscreen SPF 15 or higher at least 30 minutes before going outdoors and reapply frequently.    ACTIVITY  Fatigue is a normal side effect of radiation therapy and should improve over time.  Alternate rest and activity.  Exercise such as walking may help to improve your fatigue.    FOLLOW-UP  Continue follow-up appointments with all other doctors as necessary.  Call your radiation oncology doctor if you  are concerned with any side effects you are experiencing.    WHEN TO CALL YOUR RADIATION ONCOLOGIST OR NURSE: [phone]  You have a fever of 100.4 OF or higher.  You have chills.  Your pain or discomfort is getting worse.  Your skin in the treatment area is getting more red or swollen, feels hard or hot, has a rash or blisters, and/or is itchy.  You see drainage (liquid) coming from your skin in the treatment area.  You see any new open areas (wounds) or changes to your skin.  You have any questions or concerns.    _______________________________________________________________________    Completed by: Sushma Dasilva RN on 12/23/2024 at 09:38 EST

## 2024-12-23 NOTE — PROGRESS NOTES
RADIATION THERAPY COMPLETION and DISCHARGE     Goldie Mata completed radiation therapy on 12/23/2024 for Malignant neoplasm of lower lobe of left lung [C34.32]. The summary of her treatment is as follows:    TREATMENT SITE:   LLL SBRT START DATE:   12/17/2024   TOTAL DOSE (cGy):   5000 END DATE:   12/23/2024    DOSE/FRACTION:   1000 ELAPSED DAYS:   6   TOTAL FACTIONS:   5 PHYSICIAN:    Dr. Ethan Robles     Goldie is scheduled for a 3-month follow-up appointment with Dr. Robles on 3/24/2025 at 1:00PM with CT Chest without contrast prior.    _______________________________________________________________________    The following instructions were provided to the patient and/or family in their printed after visit summary (AVS) as well as discussed in-person by the radiation oncology nurse or medical assistant. The patient and/or family had the opportunity to ask questions and verbalized their questions were adequately answered. Encouraged patient to contact our department with any questions or concerns.      RADIATION THERAPY DISCHARGE INSTRUCTIONS  Chest    CONGRATULATIONS! You completed 5 radiation treatments for treatment of your left lower lobe lung cancer. These discharge instructions are important for you to follow until your one-month follow up appointment with your radiation oncologist. Please make sure to review these instructions and call the Radiation Oncology Department if you have any questions or concerns with symptoms you may experience. Thank you for trusting us with your cancer treatment!    DIET  Eat a regular, well balanced diet that is high in protein such as meat, eggs, cheese, and nut butters.  Drink 48 to 64 ounces of fluid daily.  Use nutritional supplements if you are not able to eat full meals.  Monitor your weight and report continued weight loss to your doctor.    MEDICATIONS  Use Tylenol as needed to decrease discomfort and irritation to treatment area.  Take pain medications  only as prescribed.  Take a laxative or stool softener as needed to prevent constipation due to pain medications.  (If applicable) Use Rachana's Magic Mouthwash or other oral pain relief medication before meals and before taking medications as needed to ease the discomfort of swallowing.    SKIN CARE  Wash treated skin gently with your hands using a mild, non-drying soap such as Dove® or Aveeno® until skin returns to normal.  Pat skin dry - do not rub.  Keep treated skin moist with twice daily applications of Eucerin® or Aquaphor®.  Always protect your treated skin when outdoors by wearing protective clothing and applying sunscreen SPF 15 or higher at least 30 minutes before going outdoors and reapply frequently.    ACTIVITY  Fatigue is a normal side effect of radiation therapy and should improve over time.  Alternate rest and activity.  Exercise such as walking may help to improve your fatigue.    FOLLOW-UP  Continue follow-up appointments with all other doctors as necessary.  Call your radiation oncology doctor if you are concerned with any side effects you are experiencing.    WHEN TO CALL YOUR RADIATION ONCOLOGIST OR NURSE: [phone]  You have a fever of 100.4 OF or higher.  You have chills.  Your pain or discomfort is getting worse.  Your skin in the treatment area is getting more red or swollen, feels hard or hot, has a rash or blisters, and/or is itchy.  You see drainage (liquid) coming from your skin in the treatment area.  You see any new open areas (wounds) or changes to your skin.  You have any questions or concerns.    _______________________________________________________________________    Completed by: Sushma Dasilva RN, Radiation Oncology Nurse on 12/23/24 at 09:38 EST

## 2024-12-26 NOTE — PROGRESS NOTES
Radiation Treatment Summary Note      Patient Name: Goldie Mata  : 1940    Attending Provider: Marshall Talamantes MD      Diagnosis:     ICD-10-CM ICD-9-CM   1. Malignant neoplasm of lower lobe of left lung  C34.32 162.5       Radiation Start Date: 2024    Radiation Completion Date: 2024      Prescription:     Site: LLL  Laterality: Left  Total Dose: 5000cGy  Dose per Fraction: 1000cGy  Total Fractions: 5  Daily or BID:  QOD  Modality: Photon  Technique: SBRT (2-5fx)  Bolus: No    Final Delivered Dose Deviated From Initially Prescribed Dose: No    Concurrent Chemotherapy: No    Patient Tolerated Treatment Without Unexpected Side Effects/Complications: Yes    ECOG: Restricted in physically strenuous activity but ambulatory and able to carry out work of a light or sedentary nature, e.g., light house work, office work = 1    Pain Management Plan: None Indicated/PRN OTC    Follow-Up Plan: 3 months    Imaging Ordered for Follow-Up: Yes, describe: CT Chest        Marshall Talamantes MD

## 2025-01-02 ENCOUNTER — HOSPITAL ENCOUNTER (OUTPATIENT)
Dept: ONCOLOGY | Facility: HOSPITAL | Age: 85
Discharge: HOME | End: 2025-01-02
Admitting: INTERNAL MEDICINE
Payer: MEDICARE

## 2025-01-02 DIAGNOSIS — Z45.2 ENCOUNTER FOR CARE RELATED TO PORT-A-CATH: Primary | ICD-10-CM

## 2025-01-02 PROCEDURE — 96523 IRRIG DRUG DELIVERY DEVICE: CPT

## 2025-01-02 PROCEDURE — 25010000002 HEPARIN LOCK FLUSH PER 10 UNITS: Performed by: INTERNAL MEDICINE

## 2025-01-02 RX ORDER — SODIUM CHLORIDE 0.9 % (FLUSH) 0.9 %
20 SYRINGE (ML) INJECTION AS NEEDED
OUTPATIENT
Start: 2025-01-02

## 2025-01-02 RX ORDER — SODIUM CHLORIDE 0.9 % (FLUSH) 0.9 %
20 SYRINGE (ML) INJECTION AS NEEDED
Status: DISCONTINUED | OUTPATIENT
Start: 2025-01-02 | End: 2025-01-03 | Stop reason: HOSPADM

## 2025-01-02 RX ORDER — HEPARIN SODIUM (PORCINE) LOCK FLUSH IV SOLN 100 UNIT/ML 100 UNIT/ML
500 SOLUTION INTRAVENOUS AS NEEDED
OUTPATIENT
Start: 2025-01-02

## 2025-01-02 RX ORDER — HEPARIN SODIUM (PORCINE) LOCK FLUSH IV SOLN 100 UNIT/ML 100 UNIT/ML
500 SOLUTION INTRAVENOUS AS NEEDED
Status: DISCONTINUED | OUTPATIENT
Start: 2025-01-02 | End: 2025-01-03 | Stop reason: HOSPADM

## 2025-01-02 RX ADMIN — Medication 20 ML: at 08:12

## 2025-01-02 RX ADMIN — HEPARIN 500 UNITS: 100 SYRINGE at 08:12

## 2025-01-02 NOTE — PROGRESS NOTES
Pt to port chair for PF. Port accessed and flushed with good blood return noted. No labs collected. Port flushed with saline and heparin prior to needle removal. AVS printed and pt discharged.

## 2025-01-17 NOTE — PROGRESS NOTES
"Chief Complaint  Follow-up (MEMORY)    Subjective          Goldie Mata presents to [facility] NEUROLOGY for MEMORY  History of Present Illness    F/U memory patient states her memory is about the same since last visit, she currently takes aricept 10 mg 1 qd.    Pt continues to live alone.   Pt has help from family     Aricept 10 mg daily        Maximum   Score Patient's   Score Questions   5 2 \"What is the year?Season?Date?Day of the week?Month?\"   5 4 \"Where are we now: State?County?Town/city?Hospital?Floor?\"   3 3 3 Unrelated objects Number of trials:___   5 1 Count backward from 100 by sevens or spell WORLD backwards   3 0 Name 3 things from above   2 2 Identify 2 objects   1 1 Repeat the phrase: No ifs, ands,or buts.   3 3 Take paper in right hand, fold it in half, and put it on the floor.   1 1 Please read this and do what it says. \"Close your eyes\"   1 1 Make up and write a sentence about anything. Noun and verb   1 1 Copy this picture 10 angles must be present.   30 19 Total MMSE         ====PREV. OV 1/15/24=====  F/U memory loss, she currently takes aricept 10 mg 1 qd.     Pt reports doing about the same, lives alone  Granddaughter works in a grocery store and buys pts grocery     Pt taking aricept 10mg daily    Mmse in jan 2017 was 17 today 20              Maximum   Score Patient's   Score Questions   5 2 \"What is the year?Season?Date?Day of the week?Month?\"   5 5 \"Where are we now: State?County?Town/city?Hospital?Floor?\"   3 3 3 Unrelated objects Number of trials:___   5 2 Count backward from 100 by sevens or spell WORLD backwards   3 0 Name 3 things from above   2 2 Identify 2 objects   1 1 Repeat the phrase: No ifs, ands,or buts.   3 3 Take paper in right hand, fold it in half, and put it on the floor.   1 1  Please read this and do what it says. \"Close your eyes\"   1 0 Make up and write a sentence about anything. Noun and verb   1 1 Copy this picture 10 angles must be present. " "  30 20 Total MMSE         Current Outpatient Medications:     aspirin 81 MG tablet, Take 1 tablet by mouth Daily., Disp: , Rfl:     Calcium Carbonate Antacid (Antacid Soft Chews) 1177 MG chewable tablet, Chew., Disp: , Rfl:     Cholecalciferol (Vitamin D3) 50 MCG (2000 UT) capsule, Take 1 capsule by mouth 2 (Two) Times a Day. Take 2 capsules daily, Disp: , Rfl:     citalopram (CeleXA) 10 MG tablet, Take 1 tablet by mouth Daily., Disp: , Rfl:     Cyanocobalamin 1000 MCG/ML kit, Inject  as directed Every 30 (Thirty) Days., Disp: , Rfl:     donepezil (ARICEPT) 10 MG tablet, Take 1 tablet by mouth Every Evening., Disp: 90 tablet, Rfl: 3    metoprolol tartrate (LOPRESSOR) 25 MG tablet, Take 1 tablet by mouth Daily., Disp: 90 tablet, Rfl: 3    omeprazole (priLOSEC) 40 MG capsule, Take 1 capsule by mouth Daily., Disp: , Rfl:     oxybutynin XL (DITROPAN-XL) 5 MG 24 hr tablet, Take 1 tablet by mouth Daily., Disp: , Rfl: 5    rosuvastatin (CRESTOR) 20 MG tablet, Take 1 tablet by mouth Daily., Disp: , Rfl:     Review of Systems   Constitutional:  Negative for fatigue.   Respiratory:  Negative for shortness of breath.    Cardiovascular:  Negative for chest pain.   Neurological:  Negative for tremors and headaches.   Psychiatric/Behavioral:  Negative for sleep disturbance.           Objective:    Vital Signs:   /71   Pulse 58   Ht 152.4 cm (60\")   Wt 65.3 kg (144 lb)   BMI 28.12 kg/m²     Physical Exam  Vitals reviewed.   Constitutional:       Appearance: Normal appearance.   Pulmonary:      Effort: Pulmonary effort is normal.   Neurological:      Mental Status: She is alert and oriented to person, place, and time.   Psychiatric:         Mood and Affect: Mood normal.        Result Review :                Neurological Exam  Mental Status  Alert. Oriented to person, place, and time.        Assessment and Plan    There are no diagnoses linked to this encounter.     Follow Up   Return in about 1 year (around " 1/20/2026).  Patient was given instructions and counseling regarding her condition or for health maintenance advice. Please see specific information pulled into the AVS if appropriate.     This document has been electronically signed by Joseph Seipel, MD on January 20, 2025 09:23 EST

## 2025-01-20 ENCOUNTER — OFFICE VISIT (OUTPATIENT)
Dept: NEUROLOGY | Facility: CLINIC | Age: 85
End: 2025-01-20
Payer: MEDICARE

## 2025-01-20 VITALS
WEIGHT: 144 LBS | SYSTOLIC BLOOD PRESSURE: 131 MMHG | BODY MASS INDEX: 28.27 KG/M2 | HEART RATE: 58 BPM | HEIGHT: 60 IN | DIASTOLIC BLOOD PRESSURE: 71 MMHG

## 2025-01-20 DIAGNOSIS — F03.90 DEMENTIA WITHOUT BEHAVIORAL DISTURBANCE: Primary | ICD-10-CM

## 2025-01-20 DIAGNOSIS — R41.3 MEMORY LOSS: ICD-10-CM

## 2025-01-20 PROCEDURE — 1160F RVW MEDS BY RX/DR IN RCRD: CPT | Performed by: PSYCHIATRY & NEUROLOGY

## 2025-01-20 PROCEDURE — 99214 OFFICE O/P EST MOD 30 MIN: CPT | Performed by: PSYCHIATRY & NEUROLOGY

## 2025-01-20 PROCEDURE — 1159F MED LIST DOCD IN RCRD: CPT | Performed by: PSYCHIATRY & NEUROLOGY

## 2025-01-20 RX ORDER — MEMANTINE HYDROCHLORIDE 10 MG/1
TABLET ORAL
Qty: 180 TABLET | Refills: 3 | Status: SHIPPED | OUTPATIENT
Start: 2025-02-20

## 2025-01-20 RX ORDER — DONEPEZIL HYDROCHLORIDE 10 MG/1
10 TABLET, FILM COATED ORAL EVERY EVENING
Qty: 90 TABLET | Refills: 3 | Status: SHIPPED | OUTPATIENT
Start: 2025-01-20

## 2025-01-20 RX ORDER — MEMANTINE HYDROCHLORIDE 5 MG/1
5 TABLET ORAL DAILY
Qty: 30 TABLET | Refills: 0 | Status: SHIPPED | OUTPATIENT
Start: 2025-01-20 | End: 2025-02-19

## 2025-02-02 NOTE — PROGRESS NOTES
[facility] Radiation Oncology   Follow Up    Chief Complaint  Lung cancer, 2-month interval imaging follow-up      Diagnosis: Radiographic Diagnosis of LLL Lung Cancer       Overall Stage IA2     cT1b: 1.4cm on CT Chest  cN0: per CT Chest  cM0: per CT Chest       Radiation Start Date: 7/31/2024  Radiation Completion Date: 8/9/2024    Prescription:     Site: Left Lower Lobe (superior)  Laterality: Left  Total Dose: 5000cGy  Dose per Fraction: 1000cGy  Total Fractions: 5  Daily or BID:  QOD  Modality: Photon  Technique: SBRT (2-5fx)  Bolus: No      Radiation Start Date: 12/17/2024  Radiation Completion Date: 12/23/2024     Prescription:      Site: Left Lower Lobe (inferior)  Laterality: Left  Total Dose: 5000cGy  Dose per Fraction: 1000cGy  Total Fractions: 5  Daily or BID:  QOD  Modality: Photon  Technique: SBRT (2-5fx)  Bolus: No         Interval History:    Goldie Mata is a 84 y.o. female who was diagnosed with suspected lung cancer to her left lower lobe in July 2024.  She completed 5 fractions empiric SBRT radiation therapy on 8/9/2024. Interval imaging showed an enlarging inferior LLL nodule. She went on to complete 5 fractions empiric SBRT radiation therapy to the inferior LLL on 12/23/2024. She presents to our clinic today for interval imaging follow-up after completion of radiation therapy.    Interval CT Chest on 2/3/2025 showed the treated LLL nodule has decreased in size and no longer cavitary (previously 2.6 cm, now 1.6 cm), no new or enlarging pulmonary nodules, and new small left pleural effusion.        Imaging:      CT Chest Without Contrast  Impression:  1. Left lower lobe cavitary nodule decreased in size and is no lower cavitary now 1.6 cm with an area of scarring at this site, previously 2.6 cm.  2. Stable 15 mm groundglass nodule in the superior segment of the left lower lobe which may relate to posttreatment related changes.  3. No new or enlarging pulmonary nodule.  4. New small left pleural  effusion.  5. Coronary artery calcifications, severe emphysema, large hiatal hernia, and additional chronic findings above.      Pathology:      No new pathology to review.        Labs:    Lab Results   Component Value Date    CREATININE 1.09 (H) 09/05/2021             Problem List:  Patient Active Problem List   Diagnosis    Abnormal cardiovascular stress test    Congestive heart failure    Coronary artery disease    Diabetes mellitus    Hyperlipidemia    Hypertension    Shortness of breath    ST elevation (STEMI) myocardial infarction    Status post coronary artery stent placement    Encounter for care related to Port-a-Cath    Chest pain    Lung nodule    Hilar lymphadenopathy    Malignant neoplasm of lung    Infrarenal abdominal aortic aneurysm (AAA) without rupture          Medications:  Current Outpatient Medications on File Prior to Visit   Medication Sig Dispense Refill    aspirin 81 MG tablet Take 1 tablet by mouth Daily.      Calcium Carbonate Antacid (Antacid Soft Chews) 1177 MG chewable tablet Chew.      Cholecalciferol (Vitamin D3) 50 MCG (2000 UT) capsule Take 1 capsule by mouth 2 (Two) Times a Day. Take 2 capsules daily      citalopram (CeleXA) 10 MG tablet Take 1 tablet by mouth Daily.      Cyanocobalamin 1000 MCG/ML kit Inject  as directed Every 30 (Thirty) Days.      donepezil (ARICEPT) 10 MG tablet Take 1 tablet by mouth Every Evening. 90 tablet 3    [START ON 2/20/2025] memantine (NAMENDA) 10 MG tablet One per day for one month then one bid 180 tablet 3    memantine (NAMENDA) 5 MG tablet Take 1 tablet by mouth Daily for 30 days. 30 tablet 0    metoprolol tartrate (LOPRESSOR) 25 MG tablet Take 1 tablet by mouth Daily. 90 tablet 3    omeprazole (priLOSEC) 40 MG capsule Take 1 capsule by mouth Daily.      oxybutynin XL (DITROPAN-XL) 5 MG 24 hr tablet Take 1 tablet by mouth Daily.  5    rosuvastatin (CRESTOR) 20 MG tablet Take 1 tablet by mouth Daily.       No current facility-administered  "medications on file prior to visit.          Allergies:  Allergies   Allergen Reactions    Contrast Dye (Echo Or Unknown Ct/Mr) Unknown (See Comments)    Hydrocodone-Acetaminophen Unknown (See Comments)    Penicillin G Unknown (See Comments)           Vital Signs:  /63   Pulse 63   Resp 20   Wt 66.4 kg (146 lb 6.4 oz)   SpO2 93%   BMI 28.59 kg/m²   Estimated body mass index is 28.59 kg/m² as calculated from the following:    Height as of 2/3/25: 152.4 cm (60\").    Weight as of this encounter: 66.4 kg (146 lb 6.4 oz).  Pain Score    02/04/25 1414   PainSc: 0-No pain         ECOG: Restricted in physically strenuous activity but ambulatory and able to carry out work of a light or sedentary nature, e.g., light house work, office work = 1    Physical Exam  Vitals reviewed.   Constitutional:       General: She is not in acute distress.     Appearance: Normal appearance.   HENT:      Head: Normocephalic and atraumatic.   Eyes:      Extraocular Movements: Extraocular movements intact.      Pupils: Pupils are equal, round, and reactive to light.   Pulmonary:      Effort: Pulmonary effort is normal.   Abdominal:      General: Abdomen is flat.      Palpations: Abdomen is soft.   Musculoskeletal:      Cervical back: Normal range of motion.   Skin:     General: Skin is warm and dry.   Neurological:      General: No focal deficit present.      Mental Status: She is alert and oriented to person, place, and time.   Psychiatric:         Mood and Affect: Mood normal.         Behavior: Behavior normal.          Result Review :  The following data was reviewed by: Marshall Talamantes MD on 02/04/2025:  Labs: Last Creatinine   Data reviewed : Radiologic studies CT Chest             Diagnoses and all orders for this visit:    1. Malignant neoplasm of lower lobe of left lung (Primary)        Assessment:    Goldie Mata is a 84 y.o. female who was diagnosed with suspected lung cancer to her left lower lobe in July 2024.  She " completed 5 fractions empiric SBRT radiation therapy on 8/9/2024. Interval imaging showed an enlarging inferior LLL nodule. She went on to complete 5 fractions empiric SBRT radiation therapy to the inferior LLL on 12/23/2024. She presents to our clinic today for interval imaging follow-up after completion of radiation therapy.    Interval CT Chest on 2/3/2025 showed the treated LLL nodule has decreased in size and no longer cavitary (previously 2.6 cm, now 1.6 cm), no new or enlarging pulmonary nodules, and new small left pleural effusion.  She has no new or concerning complaints.  She can follow-up in 3 months with repeat CT chest.      Plan:    -Follow-up in 3 months with CT chest       I spent 30 minutes caring for Goldie on this date of service. This time includes time spent by me in the following activities:preparing for the visit, reviewing tests, obtaining and/or reviewing a separately obtained history, documenting information in the medical record, independently interpreting results and communicating that information with the patient/family/caregiver, and care coordination  Follow Up   No follow-ups on file.  Patient was given instructions and counseling regarding her condition or for health maintenance advice. Please see specific information pulled into the AVS if appropriate.     Marshall Talamantes MD

## 2025-02-03 ENCOUNTER — OFFICE VISIT (OUTPATIENT)
Dept: ONCOLOGY | Facility: CLINIC | Age: 85
End: 2025-02-03
Payer: MEDICARE

## 2025-02-03 ENCOUNTER — HOSPITAL ENCOUNTER (OUTPATIENT)
Dept: ONCOLOGY | Facility: HOSPITAL | Age: 85
Discharge: HOME | End: 2025-02-03
Admitting: INTERNAL MEDICINE
Payer: MEDICARE

## 2025-02-03 ENCOUNTER — HOSPITAL ENCOUNTER (OUTPATIENT)
Dept: CT IMAGING | Facility: HOSPITAL | Age: 85
Discharge: HOME | End: 2025-02-03
Admitting: INTERNAL MEDICINE
Payer: MEDICARE

## 2025-02-03 VITALS
BODY MASS INDEX: 28.66 KG/M2 | SYSTOLIC BLOOD PRESSURE: 113 MMHG | HEART RATE: 62 BPM | OXYGEN SATURATION: 92 % | DIASTOLIC BLOOD PRESSURE: 57 MMHG | HEIGHT: 60 IN | RESPIRATION RATE: 18 BRPM | TEMPERATURE: 97.6 F | WEIGHT: 146 LBS

## 2025-02-03 DIAGNOSIS — C34.12 MALIGNANT NEOPLASM OF UPPER LOBE OF LEFT LUNG: ICD-10-CM

## 2025-02-03 DIAGNOSIS — Z45.2 ENCOUNTER FOR CARE RELATED TO PORT-A-CATH: Primary | ICD-10-CM

## 2025-02-03 DIAGNOSIS — C34.12 MALIGNANT NEOPLASM OF UPPER LOBE OF LEFT LUNG: Primary | ICD-10-CM

## 2025-02-03 LAB
BASOPHILS # BLD AUTO: 0.03 10*3/MM3 (ref 0–0.2)
BASOPHILS NFR BLD AUTO: 0.5 % (ref 0–1.5)
DEPRECATED RDW RBC AUTO: 48.9 FL (ref 37–54)
EOSINOPHIL # BLD AUTO: 0.18 10*3/MM3 (ref 0–0.4)
EOSINOPHIL NFR BLD AUTO: 3.2 % (ref 0.3–6.2)
ERYTHROCYTE [DISTWIDTH] IN BLOOD BY AUTOMATED COUNT: 15 % (ref 12.3–15.4)
HCT VFR BLD AUTO: 40.8 % (ref 34–46.6)
HGB BLD-MCNC: 12.7 G/DL (ref 12–15.9)
LYMPHOCYTES # BLD AUTO: 0.75 10*3/MM3 (ref 0.7–3.1)
LYMPHOCYTES NFR BLD AUTO: 13.2 % (ref 19.6–45.3)
MCH RBC QN AUTO: 28.9 PG (ref 26.6–33)
MCHC RBC AUTO-ENTMCNC: 31.1 G/DL (ref 31.5–35.7)
MCV RBC AUTO: 92.9 FL (ref 79–97)
MONOCYTES # BLD AUTO: 0.52 10*3/MM3 (ref 0.1–0.9)
MONOCYTES NFR BLD AUTO: 9.1 % (ref 5–12)
NEUTROPHILS NFR BLD AUTO: 4.22 10*3/MM3 (ref 1.7–7)
NEUTROPHILS NFR BLD AUTO: 74 % (ref 42.7–76)
PLATELET # BLD AUTO: 191 10*3/MM3 (ref 140–450)
PMV BLD AUTO: 11 FL (ref 6–12)
RBC # BLD AUTO: 4.39 10*6/MM3 (ref 3.77–5.28)
WBC NRBC COR # BLD AUTO: 5.7 10*3/MM3 (ref 3.4–10.8)

## 2025-02-03 PROCEDURE — 96523 IRRIG DRUG DELIVERY DEVICE: CPT

## 2025-02-03 PROCEDURE — 71250 CT THORAX DX C-: CPT

## 2025-02-03 PROCEDURE — 36591 DRAW BLOOD OFF VENOUS DEVICE: CPT

## 2025-02-03 PROCEDURE — 85025 COMPLETE CBC W/AUTO DIFF WBC: CPT | Performed by: INTERNAL MEDICINE

## 2025-02-03 PROCEDURE — 1126F AMNT PAIN NOTED NONE PRSNT: CPT | Performed by: INTERNAL MEDICINE

## 2025-02-03 PROCEDURE — 99214 OFFICE O/P EST MOD 30 MIN: CPT | Performed by: INTERNAL MEDICINE

## 2025-02-03 PROCEDURE — 25010000002 HEPARIN LOCK FLUSH PER 10 UNITS: Performed by: INTERNAL MEDICINE

## 2025-02-03 RX ORDER — HEPARIN SODIUM (PORCINE) LOCK FLUSH IV SOLN 100 UNIT/ML 100 UNIT/ML
500 SOLUTION INTRAVENOUS AS NEEDED
OUTPATIENT
Start: 2025-02-03

## 2025-02-03 RX ORDER — SODIUM CHLORIDE 0.9 % (FLUSH) 0.9 %
20 SYRINGE (ML) INJECTION AS NEEDED
OUTPATIENT
Start: 2025-02-03

## 2025-02-03 RX ORDER — HEPARIN SODIUM (PORCINE) LOCK FLUSH IV SOLN 100 UNIT/ML 100 UNIT/ML
500 SOLUTION INTRAVENOUS AS NEEDED
Status: DISCONTINUED | OUTPATIENT
Start: 2025-02-03 | End: 2025-02-04 | Stop reason: HOSPADM

## 2025-02-03 RX ORDER — SODIUM CHLORIDE 0.9 % (FLUSH) 0.9 %
20 SYRINGE (ML) INJECTION AS NEEDED
Status: DISCONTINUED | OUTPATIENT
Start: 2025-02-03 | End: 2025-02-04 | Stop reason: HOSPADM

## 2025-02-03 RX ADMIN — HEPARIN 500 UNITS: 100 SYRINGE at 11:12

## 2025-02-03 RX ADMIN — Medication 20 ML: at 11:12

## 2025-02-03 NOTE — PROGRESS NOTES
Pt here for MPF and scheduled visit with Dr Morrell.  Port accessed and flushed with good blood return noted. 10cc of blood wasted prior to specimen collection. Blood specimen obtained and sent to lab for processing per protocol.  Port flushed with saline and heparin prior to needle removal. Pt to waiting room for followup with Dr Morrell.

## 2025-02-03 NOTE — PROGRESS NOTES
Hematology/Oncology Outpatient Follow Up    PATIENT NAME:Goldie Mata  :1940  MRN: [id number]  PRIMARY CARE PHYSICIAN: Olman Hatch PA-C  REFERRING PHYSICIAN: Olman Hatch PA-C    Chief Complaint   Patient presents with    Follow-up     Malignant neoplasm of upper lobe of left lung              HISTORY OF PRESENT ILLNESS:   Transfer from Dr. 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  She received his carboplatinum and Taxotere for total of 6 cycles      she had radiation treatment to the chest from 2009 to 10/12/2009     2010 following left supraclavicular relapse patient received chemotherapy with cisplatin and Navelbin.  Not sure if she received XRT at that time     She developed left breast cancer back in  for which she had mastectomy followed by [facility] chemotherapy for 6 months.  There was no hormonal therapy or radiation treatment recommended     In  she developed colonic cancer.  Her last surveillance colonoscopy was in 2019     Patient is due to have a colonoscopy in            Patient  used to smoke   She does not drink alcohol  She lives alone and able to carry out ADLs  She was exposed to chemicals        Family history of cancer with 2 sisters with ovarian and brain cancer in their 60s    May 6, 2024 patient had CT scan of the chest which basically showed slightly larger now completely solid segment left lower lobe pulmonary nodule stable at the 7 smaller nodule.  2024 patient had a PET CT scan to further evaluate the enlarging left lower lobe nodule.  This  showed increased metabolic activity in the 1.4 cm nodule located in the superior segment of the left lower lobe SUV 4.5 cc concerning for malignancy and biopsy has been recommended.  There is a small cavitary lesion in the posterior basilar segments of the left lower lobe measuring 1 cm may be scar tissue atelectasis or malignant.  SUV was 3.64 infrarenal abdominal aortic aneurysm measuring 3.8 cm slightly increased from previous PET scan where was 3.5 cm  Patient was referred to Dr. Camacho.  She is declining biopsy of the left lung nodule  Patient completed SRS to lung mass August 2024.  She is scheduled for follow-up CT chest in October 2024  Status post SRS to an enlarging left lower lobe nodule December 2024: Dr. Talamantes  Past Medical History:   Diagnosis Date    Breast cancer     CHF (congestive heart failure)     Colon cancer     Coronary artery disease     Hyperlipidemia     Hypertension     Lung cancer     Myocardial infarction        Past Surgical History:   Procedure Laterality Date    BRONCHOSCOPY N/A 07/21/2023    Procedure: BRONCHOSCOPY WITH BRONCHOALVEOLAR LAVAGE AND ENDOBRONCHIAL ULTRASOUND WITH FINE NEEDLE ASPIRATION;  Surgeon: Carolyn Sierra MD;  Location: [facility] ENDOSCOPY;  Service: Pulmonary;  Laterality: N/A;    COLON SURGERY      CORONARY ANGIOPLASTY      greater than 5 years ago from      2023    HYSTERECTOMY      MASTECTOMY           Current Outpatient Medications:     aspirin 81 MG tablet, Take 1 tablet by mouth Daily., Disp: , Rfl:     Calcium Carbonate Antacid (Antacid Soft Chews) 1177 MG chewable tablet, Chew., Disp: , Rfl:     Cholecalciferol (Vitamin D3) 50 MCG (2000 UT) capsule, Take 1 capsule by mouth 2 (Two) Times a Day. Take 2 capsules daily, Disp: , Rfl:     citalopram (CeleXA) 10 MG tablet, Take 1 tablet by mouth Daily., Disp: , Rfl:     Cyanocobalamin 1000 MCG/ML kit, Inject  as directed Every 30 (Thirty) Days., Disp: , Rfl:     donepezil (ARICEPT) 10 MG tablet, Take 1 tablet by mouth  Every Evening., Disp: 90 tablet, Rfl: 3    [START ON 2/20/2025] memantine (NAMENDA) 10 MG tablet, One per day for one month then one bid, Disp: 180 tablet, Rfl: 3    memantine (NAMENDA) 5 MG tablet, Take 1 tablet by mouth Daily for 30 days., Disp: 30 tablet, Rfl: 0    metoprolol tartrate (LOPRESSOR) 25 MG tablet, Take 1 tablet by mouth Daily., Disp: 90 tablet, Rfl: 3    omeprazole (priLOSEC) 40 MG capsule, Take 1 capsule by mouth Daily., Disp: , Rfl:     oxybutynin XL (DITROPAN-XL) 5 MG 24 hr tablet, Take 1 tablet by mouth Daily., Disp: , Rfl: 5    rosuvastatin (CRESTOR) 20 MG tablet, Take 1 tablet by mouth Daily., Disp: , Rfl:   No current facility-administered medications for this visit.    Facility-Administered Medications Ordered in Other Visits:     heparin injection 500 Units, 500 Units, Intravenous, PRN, Emelyn Morrell MD, 500 Units at 02/03/25 1112    sodium chloride 0.9 % flush 20 mL, 20 mL, Intravenous, PRN, Emelyn Morrell MD, 20 mL at 02/03/25 1112    Allergies   Allergen Reactions    Contrast Dye (Echo Or Unknown Ct/Mr) Unknown (See Comments)    Hydrocodone-Acetaminophen Unknown (See Comments)    Penicillin G Unknown (See Comments)       Family History   Problem Relation Age of Onset    Heart disease Mother     Heart disease Father     Colon cancer Sister     Heart disease Sister        Cancer-related family history includes Colon cancer in her sister.    Social History     Tobacco Use    Smoking status: Former     Passive exposure: Never    Smokeless tobacco: Never   Vaping Use    Vaping status: Never Used   Substance Use Topics    Alcohol use: Not Currently    Drug use: Never       I have reviewed and confirmed the accuracy of the patient's history: Chief complaint, HPI, ROS, and Subjective as entered by the MA/LPN/RN. Emelyn Morrell MD 02/03/25        SUBJECTIVE:     Patient is here for fu.  He denies any new issues.  Accompanied today by her daughter for this  "appointment.    REVIEW OF SYSTEMS:  Review of Systems   Constitutional:  Negative for chills, fatigue and fever.   HENT:  Negative for congestion, drooling, ear discharge, rhinorrhea, sinus pressure and tinnitus.    Eyes:  Negative for photophobia, pain and discharge.   Respiratory:  Negative for apnea, choking and stridor.    Cardiovascular:  Negative for palpitations.   Gastrointestinal:  Negative for abdominal distention, abdominal pain and anal bleeding.   Endocrine: Negative for polydipsia and polyphagia.   Genitourinary:  Negative for decreased urine volume, flank pain and genital sores.   Musculoskeletal:  Negative for gait problem, neck pain and neck stiffness.   Skin:  Negative for color change, rash and wound.   Neurological:  Negative for tremors, seizures, syncope, facial asymmetry and speech difficulty.   Hematological:  Negative for adenopathy.   Psychiatric/Behavioral:  Negative for agitation, confusion, hallucinations and self-injury. The patient is not hyperactive.        Negative    OBJECTIVE:    Vitals:    02/03/25 1130   BP: 113/57   Pulse: 62   Resp: 18   Temp: 97.6 °F (36.4 °C)   TempSrc: Infrared   SpO2: 92%   Weight: 66.2 kg (146 lb)   Height: 152.4 cm (60\")   PainSc: 0-No pain           Body mass index is 28.51 kg/m².    ECOG  (1) Restricted in physically strenuous activity, ambulatory and able to do work of light nature    Physical Exam  Vitals and nursing note reviewed.   Constitutional:       General: She is not in acute distress.     Appearance: She is not diaphoretic.   HENT:      Head: Normocephalic and atraumatic.   Eyes:      General: No scleral icterus.        Right eye: No discharge.         Left eye: No discharge.      Conjunctiva/sclera: Conjunctivae normal.   Neck:      Thyroid: No thyromegaly.   Cardiovascular:      Rate and Rhythm: Normal rate and regular rhythm.      Heart sounds: Normal heart sounds.      No friction rub. No gallop.   Pulmonary:      Effort: Pulmonary effort " is normal. No respiratory distress.      Breath sounds: No stridor. No wheezing.   Abdominal:      General: Bowel sounds are normal.      Palpations: Abdomen is soft. There is no mass.      Tenderness: There is no abdominal tenderness. There is no guarding or rebound.   Musculoskeletal:         General: No tenderness. Normal range of motion.      Cervical back: Normal range of motion and neck supple.   Lymphadenopathy:      Cervical: No cervical adenopathy.   Skin:     General: Skin is warm.      Findings: No erythema or rash.   Neurological:      Mental Status: She is alert and oriented to person, place, and time.      Motor: No abnormal muscle tone.   Psychiatric:         Behavior: Behavior normal.       I have reexamined the patient and the results are consistent with the previously documented exam. Emelyn Morrell MD        RECENT LABS    WBC   Date Value Ref Range Status   02/03/2025 5.70 3.40 - 10.80 10*3/mm3 Final     RBC   Date Value Ref Range Status   02/03/2025 4.39 3.77 - 5.28 10*6/mm3 Final     Hemoglobin   Date Value Ref Range Status   02/03/2025 12.7 12.0 - 15.9 g/dL Final     Hematocrit   Date Value Ref Range Status   02/03/2025 40.8 34.0 - 46.6 % Final     MCV   Date Value Ref Range Status   02/03/2025 92.9 79.0 - 97.0 fL Final     MCH   Date Value Ref Range Status   02/03/2025 28.9 26.6 - 33.0 pg Final     MCHC   Date Value Ref Range Status   02/03/2025 31.1 (L) 31.5 - 35.7 g/dL Final     RDW   Date Value Ref Range Status   02/03/2025 15.0 12.3 - 15.4 % Final     RDW-SD   Date Value Ref Range Status   02/03/2025 48.9 37.0 - 54.0 fl Final     MPV   Date Value Ref Range Status   02/03/2025 11.0 6.0 - 12.0 fL Final     Platelets   Date Value Ref Range Status   02/03/2025 191 140 - 450 10*3/mm3 Final     Neutrophil %   Date Value Ref Range Status   02/03/2025 74.0 42.7 - 76.0 % Final     Lymphocyte %   Date Value Ref Range Status   02/03/2025 13.2 (L) 19.6 - 45.3 % Final     Monocyte %   Date  Value Ref Range Status   02/03/2025 9.1 5.0 - 12.0 % Final     Eosinophil %   Date Value Ref Range Status   02/03/2025 3.2 0.3 - 6.2 % Final     Basophil %   Date Value Ref Range Status   02/03/2025 0.5 0.0 - 1.5 % Final     Immature Grans %   Date Value Ref Range Status   11/25/2019 0.2 0.0 - 0.5 % Final     Neutrophils, Absolute   Date Value Ref Range Status   02/03/2025 4.22 1.70 - 7.00 10*3/mm3 Final     Lymphocytes, Absolute   Date Value Ref Range Status   02/03/2025 0.75 0.70 - 3.10 10*3/mm3 Final     Monocytes, Absolute   Date Value Ref Range Status   02/03/2025 0.52 0.10 - 0.90 10*3/mm3 Final     Eosinophils, Absolute   Date Value Ref Range Status   02/03/2025 0.18 0.00 - 0.40 10*3/mm3 Final     Basophils, Absolute   Date Value Ref Range Status   02/03/2025 0.03 0.00 - 0.20 10*3/mm3 Final     Immature Grans, Absolute   Date Value Ref Range Status   11/25/2019 0.01 0.00 - 0.05 10*3/mm3 Final     nRBC   Date Value Ref Range Status   07/21/2023 0.0 0.0 - 0.2 /100 WBC Final       Lab Results   Component Value Date    GLUCOSE 119 (H) 09/05/2021    BUN 15 09/05/2021    CREATININE 1.09 (H) 09/05/2021    EGFRIFNONA 48 (L) 09/05/2021    BCR 13.8 09/05/2021    K 4.0 09/05/2021    CO2 26.0 09/05/2021    CALCIUM 9.0 09/05/2021    ALBUMIN 3.5 01/07/2019    LABIL2 1.5 01/07/2019    AST 22 01/07/2019    ALT 11 (L) 01/07/2019         Assessment & Plan     Malignant neoplasm of upper lobe of left lung  - CBC & Differential            ASSESSMENT:    Malignant neoplasm of upper lobe of left lung  - CBC & Differential        Enlarging left lower lobe pulmonary nodule measuring 1.4 cm, PET avid suspicious for malignancy.  Small cavitary lesion posterior basilar segment of the left lower lobe 1 cm may be malignant inflammatory or infection. Per radiologist stable. Pet has been ordered by Dr Talamantes. Status post SRS to left lower lung nodule December 2024  Patient declines biopsy of the nodule  Status post SRS to lung masses.   Continue surveillance imaging  Enlarging aneurysm: Refer to Dr. Andrade.  She was referred to vascular.  Patient will follow-up  History of non small cell lung cancer stage IIIb approximately 2009  Breast cancer in 1989 status post left mastectomy no adjuvant chemotherapy or radiation recommended  Colon cancer status post colon resection followed by chemotherapy in her 40s: 1988  Family history of cancer as listed above.  She was given information to review on cancer genetics and also educated on importance of testing.  At this time patient is not sure she wants to have this test completed  Continue monthly port maintenance           Plans        Continue surveillance CT scans      Follow-up with radiation oncologist Dr. Talamantes    Continue monthly port flushes    Schedule patient for unilateral right screening mammogram which was completed 6/28/2024 normal    Continue monthly port flushes      Follow-up 3 months or earlier as needed             Electronically signed by Emelyn Morrell MD, 02/03/25, 5:48 PM EST.

## 2025-02-03 NOTE — PROGRESS NOTES
Pt here for MPF and scheduled visit with Dr Mariscal accessed and flushed with good blood return noted. No labs collected. Port flushed with saline and heparin prior to needle removal. Pt back to waiting room for appt with Dr Morrell

## 2025-02-04 ENCOUNTER — OFFICE VISIT (OUTPATIENT)
Dept: RADIATION ONCOLOGY | Facility: HOSPITAL | Age: 85
End: 2025-02-04
Payer: MEDICARE

## 2025-02-04 VITALS
WEIGHT: 146.4 LBS | RESPIRATION RATE: 20 BRPM | DIASTOLIC BLOOD PRESSURE: 63 MMHG | HEART RATE: 63 BPM | SYSTOLIC BLOOD PRESSURE: 101 MMHG | OXYGEN SATURATION: 93 % | BODY MASS INDEX: 28.59 KG/M2

## 2025-02-04 DIAGNOSIS — C34.32 MALIGNANT NEOPLASM OF LOWER LOBE OF LEFT LUNG: Primary | ICD-10-CM

## 2025-02-04 PROCEDURE — G0463 HOSPITAL OUTPT CLINIC VISIT: HCPCS | Performed by: STUDENT IN AN ORGANIZED HEALTH CARE EDUCATION/TRAINING PROGRAM

## 2025-02-10 ENCOUNTER — TRANSCRIBE ORDERS (OUTPATIENT)
Dept: ADMINISTRATIVE | Facility: HOSPITAL | Age: 85
End: 2025-02-10
Payer: MEDICARE

## 2025-02-10 DIAGNOSIS — N13.30 HYDRONEPHROSIS, UNSPECIFIED HYDRONEPHROSIS TYPE: Primary | ICD-10-CM

## 2025-02-17 RX ORDER — METOPROLOL TARTRATE 25 MG/1
25 TABLET, FILM COATED ORAL DAILY
Qty: 90 TABLET | Refills: 2 | Status: SHIPPED | OUTPATIENT
Start: 2025-02-17

## 2025-02-17 NOTE — TELEPHONE ENCOUNTER
Rx Refill Note  Requested Prescriptions     Signed Prescriptions Disp Refills    metoprolol tartrate (LOPRESSOR) 25 MG tablet 90 tablet 2     Sig: TAKE 1 TABLET BY MOUTH EVERY DAY     Authorizing Provider: SHEYLA ZAIDI     Ordering User: JESSICA MEREDITH      Last office visit with prescribing clinician: 12/9/2024   Last telemedicine visit with prescribing clinician: Visit date not found   Next office visit with prescribing clinician: 6/30/2025                         Would you like a call back once the refill request has been completed: [] Yes [] No    If the office needs to give you a call back, can they leave a voicemail: [] Yes [] No    Jessica Meredith MA  02/17/25, 09:59 EST

## 2025-02-24 DIAGNOSIS — F03.90 DEMENTIA WITHOUT BEHAVIORAL DISTURBANCE: ICD-10-CM

## 2025-02-24 RX ORDER — MEMANTINE HYDROCHLORIDE 10 MG/1
TABLET ORAL
Qty: 180 TABLET | Refills: 3 | Status: SHIPPED | OUTPATIENT
Start: 2025-02-24

## 2025-03-01 ENCOUNTER — HOSPITAL ENCOUNTER (OUTPATIENT)
Dept: CT IMAGING | Facility: HOSPITAL | Age: 85
Discharge: HOME | End: 2025-03-01
Payer: MEDICARE

## 2025-03-01 DIAGNOSIS — N13.30 HYDRONEPHROSIS, UNSPECIFIED HYDRONEPHROSIS TYPE: ICD-10-CM

## 2025-03-01 PROCEDURE — 74176 CT ABD & PELVIS W/O CONTRAST: CPT

## 2025-03-14 ENCOUNTER — TRANSCRIBE ORDERS (OUTPATIENT)
Dept: ADMINISTRATIVE | Facility: HOSPITAL | Age: 85
End: 2025-03-14
Payer: COMMERCIAL

## 2025-03-14 DIAGNOSIS — N13.30 HYDRONEPHROSIS, UNSPECIFIED HYDRONEPHROSIS TYPE: Primary | ICD-10-CM

## 2025-03-14 DIAGNOSIS — Q62.11 CONGENITAL STRICTURE OF URETEROPELVIC JUNCTION: ICD-10-CM

## 2025-03-21 ENCOUNTER — HOSPITAL ENCOUNTER (OUTPATIENT)
Dept: NUCLEAR MEDICINE | Facility: HOSPITAL | Age: 85
Discharge: HOME | End: 2025-03-21
Payer: MEDICARE

## 2025-03-21 DIAGNOSIS — N13.30 HYDRONEPHROSIS, UNSPECIFIED HYDRONEPHROSIS TYPE: ICD-10-CM

## 2025-03-21 DIAGNOSIS — Q62.11 CONGENITAL STRICTURE OF URETEROPELVIC JUNCTION: ICD-10-CM

## 2025-03-21 PROCEDURE — 25010000002 FUROSEMIDE PER 20 MG: Performed by: UROLOGY

## 2025-03-21 PROCEDURE — 78708 K FLOW/FUNCT IMAGE W/DRUG: CPT

## 2025-03-21 PROCEDURE — A9562 TC99M MERTIATIDE: HCPCS | Performed by: UROLOGY

## 2025-03-21 PROCEDURE — 34310000005 TECHNETIUM MERTIATIDE: Performed by: UROLOGY

## 2025-03-21 RX ORDER — FUROSEMIDE 10 MG/ML
20 INJECTION INTRAMUSCULAR; INTRAVENOUS
Status: COMPLETED | OUTPATIENT
Start: 2025-03-21 | End: 2025-03-21

## 2025-03-21 RX ADMIN — FUROSEMIDE 20 MG: 10 INJECTION, SOLUTION INTRAMUSCULAR; INTRAVENOUS at 13:29

## 2025-03-21 RX ADMIN — TECHNESCAN TC 99M MERTIATIDE 1 DOSE: 1 INJECTION, POWDER, LYOPHILIZED, FOR SOLUTION INTRAVENOUS at 13:29

## 2025-03-31 ENCOUNTER — HOSPITAL ENCOUNTER (OUTPATIENT)
Dept: ONCOLOGY | Facility: HOSPITAL | Age: 85
Discharge: HOME | End: 2025-03-31
Admitting: INTERNAL MEDICINE
Payer: MEDICARE

## 2025-03-31 DIAGNOSIS — Z45.2 ENCOUNTER FOR CARE RELATED TO PORT-A-CATH: Primary | ICD-10-CM

## 2025-03-31 PROCEDURE — 25010000002 HEPARIN LOCK FLUSH PER 10 UNITS: Performed by: INTERNAL MEDICINE

## 2025-03-31 PROCEDURE — 96523 IRRIG DRUG DELIVERY DEVICE: CPT

## 2025-03-31 RX ORDER — SODIUM CHLORIDE 0.9 % (FLUSH) 0.9 %
20 SYRINGE (ML) INJECTION AS NEEDED
OUTPATIENT
Start: 2025-03-31

## 2025-03-31 RX ORDER — HEPARIN SODIUM (PORCINE) LOCK FLUSH IV SOLN 100 UNIT/ML 100 UNIT/ML
500 SOLUTION INTRAVENOUS AS NEEDED
OUTPATIENT
Start: 2025-03-31

## 2025-03-31 RX ORDER — SODIUM CHLORIDE 0.9 % (FLUSH) 0.9 %
20 SYRINGE (ML) INJECTION AS NEEDED
Status: DISCONTINUED | OUTPATIENT
Start: 2025-03-31 | End: 2025-04-01 | Stop reason: HOSPADM

## 2025-03-31 RX ORDER — HEPARIN SODIUM (PORCINE) LOCK FLUSH IV SOLN 100 UNIT/ML 100 UNIT/ML
500 SOLUTION INTRAVENOUS AS NEEDED
Status: DISCONTINUED | OUTPATIENT
Start: 2025-03-31 | End: 2025-04-01 | Stop reason: HOSPADM

## 2025-03-31 RX ADMIN — Medication 20 ML: at 08:07

## 2025-03-31 RX ADMIN — Medication 500 UNITS: at 08:07

## 2025-03-31 NOTE — PROGRESS NOTES
Port accessed and flushed with good blood return noted. No labs collected. Port flushed with saline and heparin prior to needle removal. Patient aware of next appointment.

## 2025-04-22 NOTE — PROGRESS NOTES
Hematology/Oncology Outpatient Follow Up    PATIENT NAME:Goldie Mata  :1940  MRN: [id number]  PRIMARY CARE PHYSICIAN: Olman Hatch PA-C  REFERRING PHYSICIAN: Olman Hatch PA-C    Chief Complaint   Patient presents with    Follow-up     Malignant neoplasm of upper lobe of left lung              HISTORY OF PRESENT ILLNESS:   Transfer from Dr. 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  She received his carboplatinum and Taxotere for total of 6 cycles      she had radiation treatment to the chest from 2009 to 10/12/2009     2010 following left supraclavicular relapse patient received chemotherapy with cisplatin and Navelbin.  Not sure if she received XRT at that time     She developed left breast cancer back in  for which she had mastectomy followed by [facility] chemotherapy for 6 months.  There was no hormonal therapy or radiation treatment recommended     In  she developed colonic cancer.  Her last surveillance colonoscopy was in 2019     Patient is due to have a colonoscopy in            Patient  used to smoke   She does not drink alcohol  She lives alone and able to carry out ADLs  She was exposed to chemicals        Family history of cancer with 2 sisters with ovarian and brain cancer in their 60s    May 6, 2024 patient had CT scan of the chest which basically showed slightly larger now completely solid segment left lower lobe pulmonary nodule stable at the 7 smaller nodule.  2024 patient had a PET CT scan to further evaluate the enlarging left lower lobe nodule.  This  showed increased metabolic activity in the 1.4 cm nodule located in the superior segment of the left lower lobe SUV 4.5 cc concerning for malignancy and biopsy has been recommended.  There is a small cavitary lesion in the posterior basilar segments of the left lower lobe measuring 1 cm may be scar tissue atelectasis or malignant.  SUV was 3.64 infrarenal abdominal aortic aneurysm measuring 3.8 cm slightly increased from previous PET scan where was 3.5 cm  Patient was referred to Dr. Camacho.  She is declining biopsy of the left lung nodule  Patient completed SRS to lung mass August 2024.  She is scheduled for follow-up CT chest in October 2024  Status post SRS to an enlarging left lower lobe nodule December 2024: Dr. Talamantes  Past Medical History:   Diagnosis Date    Breast cancer     CHF (congestive heart failure)     Colon cancer     Coronary artery disease     Hyperlipidemia     Hypertension     Lung cancer     Myocardial infarction        Past Surgical History:   Procedure Laterality Date    BRONCHOSCOPY N/A 07/21/2023    Procedure: BRONCHOSCOPY WITH BRONCHOALVEOLAR LAVAGE AND ENDOBRONCHIAL ULTRASOUND WITH FINE NEEDLE ASPIRATION;  Surgeon: Carolyn Sierra MD;  Location: [facility] ENDOSCOPY;  Service: Pulmonary;  Laterality: N/A;    COLON SURGERY      CORONARY ANGIOPLASTY      greater than 5 years ago from      2023    HYSTERECTOMY      MASTECTOMY           Current Outpatient Medications:     albuterol sulfate  (90 Base) MCG/ACT inhaler, , Disp: , Rfl:     doxycycline (VIBRAMYCIN) 100 MG capsule, , Disp: , Rfl:     methylPREDNISolone (MEDROL) 4 MG dose pack, , Disp: , Rfl:     aspirin 81 MG tablet, Take 1 tablet by mouth Daily., Disp: , Rfl:     azithromycin (ZITHROMAX) 250 MG tablet, Take 1 tablet by mouth Daily. Take 2 tablets the first day, then 1 tablet daily for 4 days., Disp: 6 tablet, Rfl: 0    Calcium Carbonate Antacid (Antacid Soft Chews) 1177 MG chewable tablet, Chew., Disp: , Rfl:     Cholecalciferol  (Vitamin D3) 50 MCG (2000 UT) capsule, Take 1 capsule by mouth 2 (Two) Times a Day. Take 2 capsules daily, Disp: , Rfl:     citalopram (CeleXA) 10 MG tablet, Take 1 tablet by mouth Daily., Disp: , Rfl:     Cyanocobalamin 1000 MCG/ML kit, Inject  as directed Every 30 (Thirty) Days., Disp: , Rfl:     donepezil (ARICEPT) 10 MG tablet, Take 1 tablet by mouth Every Evening., Disp: 90 tablet, Rfl: 3    memantine (NAMENDA) 10 MG tablet, One per day for one month then one bid, Disp: 180 tablet, Rfl: 3    memantine (NAMENDA) 5 MG tablet, Take 1 tablet by mouth Daily for 30 days., Disp: 30 tablet, Rfl: 0    metoprolol tartrate (LOPRESSOR) 25 MG tablet, TAKE 1 TABLET BY MOUTH EVERY DAY, Disp: 90 tablet, Rfl: 2    omeprazole (priLOSEC) 40 MG capsule, Take 1 capsule by mouth Daily., Disp: , Rfl:     oxybutynin XL (DITROPAN-XL) 5 MG 24 hr tablet, Take 1 tablet by mouth Daily., Disp: , Rfl: 5    rosuvastatin (CRESTOR) 20 MG tablet, Take 1 tablet by mouth Daily., Disp: , Rfl:   No current facility-administered medications for this visit.    Facility-Administered Medications Ordered in Other Visits:     heparin injection 500 Units, 500 Units, Intravenous, PRNPetros Ifeoma Roseline, MD, 500 Units at 04/28/25 0750    sodium chloride 0.9 % flush 20 mL, 20 mL, Intravenous, PRNPetros Ifeoma Roseline, MD, 20 mL at 04/28/25 0750    Allergies   Allergen Reactions    Contrast Dye (Echo Or Unknown Ct/Mr) Unknown (See Comments)    Hydrocodone-Acetaminophen Unknown (See Comments)    Penicillin G Unknown (See Comments)       Family History   Problem Relation Age of Onset    Heart disease Mother     Heart disease Father     Colon cancer Sister     Heart disease Sister        Cancer-related family history includes Colon cancer in her sister.    Social History     Tobacco Use    Smoking status: Former     Passive exposure: Never    Smokeless tobacco: Never   Vaping Use    Vaping status: Never Used   Substance Use Topics    Alcohol use: Not  "Currently    Drug use: Never       I have reviewed and confirmed the accuracy of the patient's history: Chief complaint, HPI, ROS, and Subjective as entered by the MA/LPN/RN. Emelyn Morrell MD 04/28/25      SUBJECTIVE:     Patient is here for fu.  He denies any new issues.  Accompanied today by her daughter for this appointment.      Denies any new issues today.  Her port is intact.    REVIEW OF SYSTEMS:  Review of Systems   Constitutional:  Negative for chills, fatigue and fever.   HENT:  Negative for congestion, drooling, ear discharge, rhinorrhea, sinus pressure and tinnitus.    Eyes:  Negative for photophobia, pain and discharge.   Respiratory:  Negative for apnea, choking and stridor.    Cardiovascular:  Negative for palpitations.   Gastrointestinal:  Negative for abdominal distention, abdominal pain and anal bleeding.   Endocrine: Negative for polydipsia and polyphagia.   Genitourinary:  Negative for decreased urine volume, flank pain and genital sores.   Musculoskeletal:  Negative for gait problem, neck pain and neck stiffness.   Skin:  Negative for color change, rash and wound.   Neurological:  Negative for tremors, seizures, syncope, facial asymmetry and speech difficulty.   Hematological:  Negative for adenopathy.   Psychiatric/Behavioral:  Negative for agitation, confusion, hallucinations and self-injury. The patient is not hyperactive.        Negative    OBJECTIVE:    Vitals:    04/28/25 0827   BP: 112/40   Pulse: 66   Resp: 18   Temp: 97.3 °F (36.3 °C)   TempSrc: Temporal   SpO2: 95%   Weight: 62.6 kg (138 lb)   Height: 157.5 cm (62\")   PainSc: 0-No pain             Body mass index is 25.24 kg/m².    ECOG  (1) Restricted in physically strenuous activity, ambulatory and able to do work of light nature    Physical Exam  Vitals and nursing note reviewed.   Constitutional:       General: She is not in acute distress.     Appearance: She is not diaphoretic.   HENT:      Head: Normocephalic and " atraumatic.   Eyes:      General: No scleral icterus.        Right eye: No discharge.         Left eye: No discharge.      Conjunctiva/sclera: Conjunctivae normal.   Neck:      Thyroid: No thyromegaly.   Cardiovascular:      Rate and Rhythm: Normal rate and regular rhythm.      Heart sounds: Normal heart sounds.      No friction rub. No gallop.   Pulmonary:      Effort: Pulmonary effort is normal. No respiratory distress.      Breath sounds: No stridor. No wheezing.   Abdominal:      General: Bowel sounds are normal.      Palpations: Abdomen is soft. There is no mass.      Tenderness: There is no abdominal tenderness. There is no guarding or rebound.   Musculoskeletal:         General: No tenderness. Normal range of motion.      Cervical back: Normal range of motion and neck supple.   Lymphadenopathy:      Cervical: No cervical adenopathy.   Skin:     General: Skin is warm.      Findings: No erythema or rash.   Neurological:      Mental Status: She is alert and oriented to person, place, and time.      Motor: No abnormal muscle tone.   Psychiatric:         Behavior: Behavior normal.     I have reexamined the patient and the results are consistent with the previously documented exam. Emelyn Delilah Morrell MD      RECENT LABS    WBC   Date Value Ref Range Status   04/28/2025 7.92 3.40 - 10.80 10*3/mm3 Final     RBC   Date Value Ref Range Status   04/28/2025 4.59 3.77 - 5.28 10*6/mm3 Final     Hemoglobin   Date Value Ref Range Status   04/28/2025 13.6 12.0 - 15.9 g/dL Final     Hematocrit   Date Value Ref Range Status   04/28/2025 42.9 34.0 - 46.6 % Final     MCV   Date Value Ref Range Status   04/28/2025 93.5 79.0 - 97.0 fL Final     MCH   Date Value Ref Range Status   04/28/2025 29.6 26.6 - 33.0 pg Final     MCHC   Date Value Ref Range Status   04/28/2025 31.7 31.5 - 35.7 g/dL Final     RDW   Date Value Ref Range Status   04/28/2025 14.9 12.3 - 15.4 % Final     RDW-SD   Date Value Ref Range Status   04/28/2025 49.3  37.0 - 54.0 fl Final     MPV   Date Value Ref Range Status   04/28/2025 9.7 6.0 - 12.0 fL Final     Platelets   Date Value Ref Range Status   04/28/2025 178 140 - 450 10*3/mm3 Final     Neutrophil %   Date Value Ref Range Status   04/28/2025 78.2 (H) 42.7 - 76.0 % Final     Lymphocyte %   Date Value Ref Range Status   04/28/2025 11.7 (L) 19.6 - 45.3 % Final     Monocyte %   Date Value Ref Range Status   04/28/2025 6.9 5.0 - 12.0 % Final     Eosinophil %   Date Value Ref Range Status   04/28/2025 2.8 0.3 - 6.2 % Final     Basophil %   Date Value Ref Range Status   04/28/2025 0.4 0.0 - 1.5 % Final     Immature Grans %   Date Value Ref Range Status   11/25/2019 0.2 0.0 - 0.5 % Final     Neutrophils, Absolute   Date Value Ref Range Status   04/28/2025 6.19 1.70 - 7.00 10*3/mm3 Final     Lymphocytes, Absolute   Date Value Ref Range Status   04/28/2025 0.93 0.70 - 3.10 10*3/mm3 Final     Monocytes, Absolute   Date Value Ref Range Status   04/28/2025 0.55 0.10 - 0.90 10*3/mm3 Final     Eosinophils, Absolute   Date Value Ref Range Status   04/28/2025 0.22 0.00 - 0.40 10*3/mm3 Final     Basophils, Absolute   Date Value Ref Range Status   04/28/2025 0.03 0.00 - 0.20 10*3/mm3 Final     Immature Grans, Absolute   Date Value Ref Range Status   11/25/2019 0.01 0.00 - 0.05 10*3/mm3 Final     nRBC   Date Value Ref Range Status   07/21/2023 0.0 0.0 - 0.2 /100 WBC Final       Lab Results   Component Value Date    GLUCOSE 119 (H) 09/05/2021    BUN 15 09/05/2021    CREATININE 1.09 (H) 09/05/2021    EGFRIFNONA 48 (L) 09/05/2021    BCR 13.8 09/05/2021    K 4.0 09/05/2021    CO2 26.0 09/05/2021    CALCIUM 9.0 09/05/2021    ALBUMIN 3.5 01/07/2019    AST 22 01/07/2019    ALT 11 (L) 01/07/2019         Assessment & Plan     Malignant neoplasm of upper lobe of left lung  - CBC & Differential              ASSESSMENT:    Malignant neoplasm of upper lobe of left lung  - CBC & Differential        Enlarging left lower lobe pulmonary nodule  measuring 1.4 cm, PET avid suspicious for malignancy.  Small cavitary lesion posterior basilar segment of the left lower lobe 1 cm may be malignant inflammatory or infection. Per radiologist stable. Pet has been ordered by Dr Talamantes. Status post SRS to left lower lung nodule December 2024.  Continue surveillance CT scans  Patient declines biopsy of the nodule  Status post SRS to lung masses.  Continue surveillance imaging  Enlarging aneurysm: Refer to Dr. Andrade.  She was referred to vascular.  Patient will follow-up with Dr. Andrade  History of non small cell lung cancer stage IIIb approximately 2009  Breast cancer in 1989 status post left mastectomy no adjuvant chemotherapy or radiation recommended  Colon cancer status post colon resection followed by chemotherapy in her 40s: 1988  Family history of cancer as listed above.  She was given information to review on cancer genetics and also educated on importance of testing.  At this time patient is not sure she wants to have this test completed  Continue monthly port maintenance           Plans        Continue surveillance CT scans    Labs reviewed      Follow-up with radiation oncologist Dr. Talamantes    Continue monthly port flushes    Schedule patient for unilateral right screening mammogram which was completed 6/28/2024 normal    Follow-up 3 months or earlier as needed      Electronically signed by Emelyn Morrell MD, 04/28/25, 3:07 PM EDT.

## 2025-04-28 ENCOUNTER — OFFICE VISIT (OUTPATIENT)
Dept: ONCOLOGY | Facility: CLINIC | Age: 85
End: 2025-04-28
Payer: MEDICARE

## 2025-04-28 ENCOUNTER — HOSPITAL ENCOUNTER (OUTPATIENT)
Dept: ONCOLOGY | Facility: HOSPITAL | Age: 85
Discharge: HOME | End: 2025-04-28
Admitting: INTERNAL MEDICINE
Payer: MEDICARE

## 2025-04-28 VITALS
OXYGEN SATURATION: 95 % | DIASTOLIC BLOOD PRESSURE: 40 MMHG | BODY MASS INDEX: 25.4 KG/M2 | WEIGHT: 138 LBS | TEMPERATURE: 97.3 F | HEART RATE: 66 BPM | SYSTOLIC BLOOD PRESSURE: 112 MMHG | RESPIRATION RATE: 18 BRPM | HEIGHT: 62 IN

## 2025-04-28 DIAGNOSIS — C34.12 MALIGNANT NEOPLASM OF UPPER LOBE OF LEFT LUNG: Primary | ICD-10-CM

## 2025-04-28 DIAGNOSIS — C34.12 MALIGNANT NEOPLASM OF UPPER LOBE OF LEFT LUNG: ICD-10-CM

## 2025-04-28 DIAGNOSIS — Z45.2 ENCOUNTER FOR CARE RELATED TO PORT-A-CATH: Primary | ICD-10-CM

## 2025-04-28 LAB
BASOPHILS # BLD AUTO: 0.03 10*3/MM3 (ref 0–0.2)
BASOPHILS NFR BLD AUTO: 0.4 % (ref 0–1.5)
DEPRECATED RDW RBC AUTO: 49.3 FL (ref 37–54)
EOSINOPHIL # BLD AUTO: 0.22 10*3/MM3 (ref 0–0.4)
EOSINOPHIL NFR BLD AUTO: 2.8 % (ref 0.3–6.2)
ERYTHROCYTE [DISTWIDTH] IN BLOOD BY AUTOMATED COUNT: 14.9 % (ref 12.3–15.4)
HCT VFR BLD AUTO: 42.9 % (ref 34–46.6)
HGB BLD-MCNC: 13.6 G/DL (ref 12–15.9)
LYMPHOCYTES # BLD AUTO: 0.93 10*3/MM3 (ref 0.7–3.1)
LYMPHOCYTES NFR BLD AUTO: 11.7 % (ref 19.6–45.3)
MCH RBC QN AUTO: 29.6 PG (ref 26.6–33)
MCHC RBC AUTO-ENTMCNC: 31.7 G/DL (ref 31.5–35.7)
MCV RBC AUTO: 93.5 FL (ref 79–97)
MONOCYTES # BLD AUTO: 0.55 10*3/MM3 (ref 0.1–0.9)
MONOCYTES NFR BLD AUTO: 6.9 % (ref 5–12)
NEUTROPHILS NFR BLD AUTO: 6.19 10*3/MM3 (ref 1.7–7)
NEUTROPHILS NFR BLD AUTO: 78.2 % (ref 42.7–76)
PLATELET # BLD AUTO: 178 10*3/MM3 (ref 140–450)
PMV BLD AUTO: 9.7 FL (ref 6–12)
RBC # BLD AUTO: 4.59 10*6/MM3 (ref 3.77–5.28)
WBC NRBC COR # BLD AUTO: 7.92 10*3/MM3 (ref 3.4–10.8)

## 2025-04-28 PROCEDURE — 36591 DRAW BLOOD OFF VENOUS DEVICE: CPT

## 2025-04-28 PROCEDURE — 99214 OFFICE O/P EST MOD 30 MIN: CPT | Performed by: INTERNAL MEDICINE

## 2025-04-28 PROCEDURE — 1126F AMNT PAIN NOTED NONE PRSNT: CPT | Performed by: INTERNAL MEDICINE

## 2025-04-28 PROCEDURE — 85025 COMPLETE CBC W/AUTO DIFF WBC: CPT | Performed by: INTERNAL MEDICINE

## 2025-04-28 PROCEDURE — 25010000002 HEPARIN LOCK FLUSH PER 10 UNITS: Performed by: INTERNAL MEDICINE

## 2025-04-28 RX ORDER — HEPARIN SODIUM (PORCINE) LOCK FLUSH IV SOLN 100 UNIT/ML 100 UNIT/ML
500 SOLUTION INTRAVENOUS AS NEEDED
OUTPATIENT
Start: 2025-04-28

## 2025-04-28 RX ORDER — HEPARIN SODIUM (PORCINE) LOCK FLUSH IV SOLN 100 UNIT/ML 100 UNIT/ML
500 SOLUTION INTRAVENOUS AS NEEDED
Status: DISCONTINUED | OUTPATIENT
Start: 2025-04-28 | End: 2025-04-29 | Stop reason: HOSPADM

## 2025-04-28 RX ORDER — METHYLPREDNISOLONE 4 MG/1
TABLET ORAL
COMMUNITY
Start: 2025-04-16

## 2025-04-28 RX ORDER — SODIUM CHLORIDE 0.9 % (FLUSH) 0.9 %
20 SYRINGE (ML) INJECTION AS NEEDED
OUTPATIENT
Start: 2025-04-28

## 2025-04-28 RX ORDER — ALBUTEROL SULFATE 90 UG/1
INHALANT RESPIRATORY (INHALATION)
COMMUNITY
Start: 2025-02-12

## 2025-04-28 RX ORDER — SODIUM CHLORIDE 0.9 % (FLUSH) 0.9 %
20 SYRINGE (ML) INJECTION AS NEEDED
Status: DISCONTINUED | OUTPATIENT
Start: 2025-04-28 | End: 2025-04-29 | Stop reason: HOSPADM

## 2025-04-28 RX ORDER — DOXYCYCLINE 100 MG/1
CAPSULE ORAL
COMMUNITY
Start: 2025-04-16

## 2025-04-28 RX ADMIN — HEPARIN SODIUM (PORCINE) LOCK FLUSH IV SOLN 100 UNIT/ML 500 UNITS: 100 SOLUTION at 07:50

## 2025-04-28 RX ADMIN — Medication 20 ML: at 07:50

## 2025-04-28 NOTE — PROGRESS NOTES
Port accessed for blood collection using sterile technique. Port aspirated, positive blood return noted. 10 ml blood wasted prior to blood for labs being collected. Port flushed with NS and heparin, then de-accessed. Pt tolerated well.

## 2025-05-05 ENCOUNTER — HOSPITAL ENCOUNTER (OUTPATIENT)
Dept: PET IMAGING | Facility: HOSPITAL | Age: 85
Discharge: HOME | End: 2025-05-05
Admitting: STUDENT IN AN ORGANIZED HEALTH CARE EDUCATION/TRAINING PROGRAM
Payer: MEDICARE

## 2025-05-05 DIAGNOSIS — C34.32 MALIGNANT NEOPLASM OF LOWER LOBE OF LEFT LUNG: ICD-10-CM

## 2025-05-05 PROCEDURE — 71250 CT THORAX DX C-: CPT

## 2025-05-13 ENCOUNTER — OFFICE VISIT (OUTPATIENT)
Dept: RADIATION ONCOLOGY | Facility: HOSPITAL | Age: 85
End: 2025-05-13
Payer: MEDICARE

## 2025-05-13 VITALS
RESPIRATION RATE: 18 BRPM | HEART RATE: 69 BPM | OXYGEN SATURATION: 90 % | SYSTOLIC BLOOD PRESSURE: 109 MMHG | BODY MASS INDEX: 25.97 KG/M2 | DIASTOLIC BLOOD PRESSURE: 58 MMHG | WEIGHT: 142 LBS

## 2025-05-13 DIAGNOSIS — C34.32 MALIGNANT NEOPLASM OF LOWER LOBE OF LEFT LUNG: Primary | ICD-10-CM

## 2025-05-13 PROCEDURE — G0463 HOSPITAL OUTPT CLINIC VISIT: HCPCS | Performed by: INTERNAL MEDICINE

## 2025-05-13 NOTE — PROGRESS NOTES
[facility] RADIATION ONCOLOGY  FOLLOW-UP    Name: Goldie Mata  YOB: 1940  MRN #: [id number]  Date of Service: 5/15/2025    Chief Complaint:  Routine 3 month follow up with interval imaging review    Diagnosis:   Encounter Diagnosis   Name Primary?    Malignant neoplasm of lower lobe of left lung Yes          Radiation Treatment Course       Treating Physician: Dr. Marshall Talamantes      Start: 7/31/2024     End: 8/9/2024   Site: Left Lung     Total Dose: 5000 cGy    Dose/Fraction: 1000 cGy    Total Fractions: 5 Daily or BID:  Every other day  Modality: Photon  Technique: IMRT/VMAT/Rapid Arc  Bolus: No     Start: 12/17/2024     End: 12/23/2024   Site: LLL    Total Dose: 5000 cGy    Dose/Fraction: 1000 cGy    Total Fractions: 5 Daily or BID:  3 times a week  Modality: Photon  Technique: IMRT/VMAT/Rapid Arc  Bolus: No         Interval History       Goldie Mata is a 84 y.o. female who was diagnosed with suspected lung cancer to her left lower lobe in July 2024.  She completed 5 fractions empiric SBRT radiation therapy on 8/9/2024. Interval imaging showed an enlarging inferior LLL nodule. She went on to complete 5 fractions empiric SBRT radiation therapy to the inferior LLL on 12/23/2024. She presents to our clinic today for routine follow up with interval imaging review.    The patient has ongoing symptoms of cough and pneumonialike symptoms.  She feels like things have been improving recently.  She presents to review recent imaging.        Imaging   CT Chest Without Contrast Diagnostic  Result Date: 5/7/2025  Impression: 1. Treated left lower lobe pulmonary nodule obscured with band of posttreatment related scarring at the left lower lobe similar to the prior study. 2. New focal consolidation involving the medial basilar left lower lobe with patchy areas of nodular airspace disease in the anterior basilar left lower lobe may relate pneumonia and/or to posttreatment pneumonitis,  recommend correlation for associated clinical findings, consider short-term imaging follow-up to evaluate for resolution. 3. Severe emphysema with chronic bronchiectasis involving posterior left upper lobe unchanged. Chronic scarring involving the central right upper lobe adjacent to the hilum stable. 4. Small left pleural effusion similar to the prior study. 5. New mild superior endplate compression fracture at T5. 6. Additional chronic findings above.     CT Abdomen Pelvis Without Contrast  Result Date: 3/4/2025  Impression: 1.Moderate right-sided hydronephrosis with transition point at the right UPJ. Findings suggest UPJ stenosis. 2.Left renal atrophy. 3.Stable abdominal aortic aneurysm measuring 4.2 cm. 4.Left inguinal hernia containing a short segment of nonobstructed sigmoid colon. 5.Large hiatal hernia containing greater than one half of the stomach.     CT Chest Without Contrast Diagnostic  Result Date: 2/5/2025  Impression: 1. Left lower lobe cavitary nodule decreased in size and is no lower cavitary now 1.6 cm with an area of scarring at this site, previously 2.6 cm. 2. Stable 15 mm groundglass nodule in the superior segment of the left lower lobe which may relate to posttreatment related changes. 3. No new or enlarging pulmonary nodule. 4. New small left pleural effusion. 5. Coronary artery calcifications, severe emphysema, large hiatal hernia, and additional chronic findings above.       Pathology       No new pathology to review.      Labs       Hematology:  WBC   Date Value Ref Range Status   04/28/2025 7.92 3.40 - 10.80 10*3/mm3 Final     RBC   Date Value Ref Range Status   04/28/2025 4.59 3.77 - 5.28 10*6/mm3 Final     Hemoglobin   Date Value Ref Range Status   04/28/2025 13.6 12.0 - 15.9 g/dL Final     Hematocrit   Date Value Ref Range Status   04/28/2025 42.9 34.0 - 46.6 % Final     Platelets   Date Value Ref Range Status   04/28/2025 178 140 - 450 10*3/mm3 Final     Chemistry:  Lab Results    Component Value Date    GLUCOSE 119 (H) 09/05/2021    BUN 15 09/05/2021    CREATININE 1.09 (H) 09/05/2021    EGFRIFNONA 48 (L) 09/05/2021    BCR 13.8 09/05/2021    K 4.0 09/05/2021    CO2 26.0 09/05/2021    CALCIUM 9.0 09/05/2021    ALBUMIN 3.5 01/07/2019    AST 22 01/07/2019    ALT 11 (L) 01/07/2019         Problem List       Patient Active Problem List   Diagnosis    Abnormal cardiovascular stress test    Congestive heart failure    Coronary artery disease    Diabetes mellitus    Hyperlipidemia    Hypertension    Shortness of breath    ST elevation (STEMI) myocardial infarction    Status post coronary artery stent placement    Encounter for care related to Port-a-Cath    Chest pain    Lung nodule    Hilar lymphadenopathy    Malignant neoplasm of lung    Infrarenal abdominal aortic aneurysm (AAA) without rupture          Current Medications       Current Outpatient Medications   Medication Instructions    albuterol sulfate  (90 Base) MCG/ACT inhaler     aspirin 81 mg, Daily    azithromycin (ZITHROMAX) 250 mg, Oral, Daily, Take 2 tablets the first day, then 1 tablet daily for 4 days.    Calcium Carbonate Antacid (Antacid Soft Chews) 1177 MG chewable tablet Chew.    citalopram (CELEXA) 10 mg, Daily    Cyanocobalamin 1000 MCG/ML kit Every 30 Days    donepezil (ARICEPT) 10 mg, Oral, Every Evening    doxycycline (VIBRAMYCIN) 100 MG capsule     memantine (NAMENDA) 10 MG tablet One per day for one month then one bid    memantine (NAMENDA) 5 mg, Oral, Daily    methylPREDNISolone (MEDROL) 4 MG dose pack     metoprolol tartrate (LOPRESSOR) 25 mg, Oral, Daily    omeprazole (PRILOSEC) 40 mg, Daily    oxybutynin XL (DITROPAN-XL) 5 mg, Daily    rosuvastatin (CRESTOR) 20 mg, Daily    Vitamin D3 2,000 Units, 2 Times Daily          Allergies       Allergies   Allergen Reactions    Contrast Dye (Echo Or Unknown Ct/Mr) Unknown (See Comments)    Hydrocodone-Acetaminophen Unknown (See Comments)    Penicillin G Unknown (See  Comments)           Review of Systems         Vitals:    05/13/25 1406   BP: 109/58   Pulse: 69   Resp: 18   SpO2: 90%      Physical Exam  Constitutional:       General: She is not in acute distress.  HENT:      Head: Normocephalic and atraumatic.   Pulmonary:      Effort: Pulmonary effort is normal. No respiratory distress.   Neurological:      Mental Status: She is alert and oriented to person, place, and time. Mental status is at baseline.   Psychiatric:         Mood and Affect: Mood normal.         Behavior: Behavior normal.          ECOG:  Restricted in physically strenuous activity but ambulatory and able to carry out work of a light or sedentary nature, e.g., light house work, office work = 1          Assessment and Plan       Assessment:          Goldie Mata is a 84 y.o. female who was diagnosed with suspected lung cancer to her left lower lobe in July 2024.  She completed 5 fractions empiric SBRT radiation therapy on 8/9/2024. Interval imaging showed an enlarging inferior LLL nodule. She went on to complete 5 fractions empiric SBRT radiation therapy to the inferior LLL on 12/23/2024. She presents to our clinic today for routine follow up with interval imaging review.    Diagnoses and all orders for this visit:    1. Malignant neoplasm of lower lobe of left lung (Primary)  -     CT Chest Without Contrast Diagnostic; Future       Plan:      Orders:  - CT chest in 3 months or sooner as clinically indicated for short interval imaging    We reviewed the patient's recent CT scans.  We discussed the treated left lower lobe pulmonary nodule that was obscured due to posttreatment related scarring  And that this was similar to the prior study.  We also discussed the new focal consolidation involving the medial basilar left lower lobe with patchy areas of nodular airspace disease.  We discussed this could be related to pneumonia and/or posttreatment pneumonitis.  Patient is doing well symptomatically at the  moment.  We discussed plans for close imaging surveillance to ensure this resolves.  We discussed plans to follow-up in 3 months with CT chest or sooner as clinically indicated.  The patient expressed understanding.  She is encouraged to reach out with any questions or concerns prior to her next appointment.    I spent 30 minutes caring for Goldie on this date of service. This time includes time spent by me in the following activities:preparing for the visit, reviewing tests, obtaining and/or reviewing a separately obtained history, performing a medically appropriate examination and/or evaluation , counseling and educating the patient/family/caregiver, ordering medications, tests, or procedures, documenting information in the medical record, and independently interpreting results and communicating that information with the patient/family/caregiver        Follow-Up       No follow-ups on file.       CC: Olman Hatch PA-C

## 2025-05-30 ENCOUNTER — HOSPITAL ENCOUNTER (OUTPATIENT)
Dept: ONCOLOGY | Facility: HOSPITAL | Age: 85
Discharge: HOME | End: 2025-05-30
Payer: MEDICARE

## 2025-05-30 DIAGNOSIS — Z45.2 ENCOUNTER FOR CARE RELATED TO PORT-A-CATH: Primary | ICD-10-CM

## 2025-05-30 PROCEDURE — 96523 IRRIG DRUG DELIVERY DEVICE: CPT

## 2025-05-30 PROCEDURE — 25010000002 HEPARIN LOCK FLUSH PER 10 UNITS: Performed by: INTERNAL MEDICINE

## 2025-05-30 RX ORDER — HEPARIN SODIUM (PORCINE) LOCK FLUSH IV SOLN 100 UNIT/ML 100 UNIT/ML
500 SOLUTION INTRAVENOUS AS NEEDED
OUTPATIENT
Start: 2025-05-30

## 2025-05-30 RX ORDER — SODIUM CHLORIDE 0.9 % (FLUSH) 0.9 %
20 SYRINGE (ML) INJECTION AS NEEDED
OUTPATIENT
Start: 2025-05-30

## 2025-05-30 RX ORDER — HEPARIN SODIUM (PORCINE) LOCK FLUSH IV SOLN 100 UNIT/ML 100 UNIT/ML
500 SOLUTION INTRAVENOUS AS NEEDED
Status: DISCONTINUED | OUTPATIENT
Start: 2025-05-30 | End: 2025-05-31 | Stop reason: HOSPADM

## 2025-05-30 RX ORDER — SODIUM CHLORIDE 0.9 % (FLUSH) 0.9 %
20 SYRINGE (ML) INJECTION AS NEEDED
Status: DISCONTINUED | OUTPATIENT
Start: 2025-05-30 | End: 2025-05-31 | Stop reason: HOSPADM

## 2025-05-30 RX ADMIN — Medication 20 ML: at 08:25

## 2025-05-30 RX ADMIN — HEPARIN 500 UNITS: 100 SYRINGE at 08:25

## 2025-06-30 ENCOUNTER — HOSPITAL ENCOUNTER (OUTPATIENT)
Dept: ONCOLOGY | Facility: HOSPITAL | Age: 85
Discharge: HOME | End: 2025-06-30
Admitting: INTERNAL MEDICINE
Payer: MEDICARE

## 2025-06-30 DIAGNOSIS — Z45.2 ENCOUNTER FOR CARE RELATED TO PORT-A-CATH: Primary | ICD-10-CM

## 2025-06-30 PROCEDURE — 96523 IRRIG DRUG DELIVERY DEVICE: CPT

## 2025-06-30 PROCEDURE — 25010000002 HEPARIN LOCK FLUSH PER 10 UNITS: Performed by: INTERNAL MEDICINE

## 2025-06-30 RX ORDER — HEPARIN SODIUM (PORCINE) LOCK FLUSH IV SOLN 100 UNIT/ML 100 UNIT/ML
500 SOLUTION INTRAVENOUS AS NEEDED
Status: DISCONTINUED | OUTPATIENT
Start: 2025-06-30 | End: 2025-07-01 | Stop reason: HOSPADM

## 2025-06-30 RX ORDER — SODIUM CHLORIDE 0.9 % (FLUSH) 0.9 %
20 SYRINGE (ML) INJECTION AS NEEDED
Status: DISCONTINUED | OUTPATIENT
Start: 2025-06-30 | End: 2025-07-01 | Stop reason: HOSPADM

## 2025-06-30 RX ORDER — SODIUM CHLORIDE 0.9 % (FLUSH) 0.9 %
20 SYRINGE (ML) INJECTION AS NEEDED
OUTPATIENT
Start: 2025-06-30

## 2025-06-30 RX ORDER — HEPARIN SODIUM (PORCINE) LOCK FLUSH IV SOLN 100 UNIT/ML 100 UNIT/ML
500 SOLUTION INTRAVENOUS AS NEEDED
OUTPATIENT
Start: 2025-06-30

## 2025-06-30 RX ADMIN — Medication 10 ML: at 07:37

## 2025-06-30 RX ADMIN — Medication 500 UNITS: at 07:37

## 2025-06-30 NOTE — PROGRESS NOTES
Port accessed and flushed with good blood return noted. No labs collected. Port flushed with saline and heparin prior to needle removal.  Patient has next apts and denies further needs today.

## 2025-07-22 ENCOUNTER — OFFICE VISIT (OUTPATIENT)
Dept: CARDIOLOGY | Facility: CLINIC | Age: 85
End: 2025-07-22
Payer: MEDICARE

## 2025-07-22 VITALS
BODY MASS INDEX: 25.95 KG/M2 | HEIGHT: 62 IN | SYSTOLIC BLOOD PRESSURE: 146 MMHG | OXYGEN SATURATION: 97 % | HEART RATE: 59 BPM | DIASTOLIC BLOOD PRESSURE: 69 MMHG | WEIGHT: 141 LBS

## 2025-07-22 DIAGNOSIS — I50.22 CHRONIC SYSTOLIC CONGESTIVE HEART FAILURE: ICD-10-CM

## 2025-07-22 DIAGNOSIS — I25.118 CORONARY ARTERY DISEASE OF NATIVE ARTERY OF NATIVE HEART WITH STABLE ANGINA PECTORIS: Primary | ICD-10-CM

## 2025-07-22 DIAGNOSIS — I10 ESSENTIAL HYPERTENSION: ICD-10-CM

## 2025-07-22 DIAGNOSIS — E78.00 PURE HYPERCHOLESTEROLEMIA: ICD-10-CM

## 2025-07-22 RX ORDER — FLUTICASONE PROPIONATE AND SALMETEROL XINAFOATE 45; 21 UG/1; UG/1
AEROSOL, METERED RESPIRATORY (INHALATION)
COMMUNITY
Start: 2025-07-11

## 2025-07-22 RX ORDER — CETIRIZINE HYDROCHLORIDE 10 MG/1
10 TABLET ORAL DAILY
COMMUNITY

## 2025-07-22 NOTE — PROGRESS NOTES
"    Subjective:     Encounter Date:07/22/2025      Patient ID: Goldie Mata is a 84 y.o. female.    Chief Complaint:  History of Present Illness 78-year-old white female with history of coronary disease history of HFrEF hypertension hyperlipidemia and diabetes presents to the office for a follow-up.  Patient is currently stable without any symptoms of chest pain or shortness of breath at rest or exertion.  No compressively PND or orthopnea.  No palpitations dizziness syncope or swelling of the feet.  Patient is taking all the medicines regularly.  Patient does not smoke    The following portions of the patient's history were reviewed and updated as appropriate: allergies, current medications, past family history, past medical history, past social history, past surgical history, and problem list.  Past Medical History:   Diagnosis Date    Breast cancer     CHF (congestive heart failure)     Chronic kidney disease     Colon cancer     Congenital heart disease     COPD (chronic obstructive pulmonary disease)     Coronary artery disease     Dementia     Hyperlipidemia     Hypertension     Lung cancer     Memory loss     Myocardial infarction      Past Surgical History:   Procedure Laterality Date    BRONCHOSCOPY N/A 07/21/2023    Procedure: BRONCHOSCOPY WITH BRONCHOALVEOLAR LAVAGE AND ENDOBRONCHIAL ULTRASOUND WITH FINE NEEDLE ASPIRATION;  Surgeon: Carolyn Sierra MD;  Location: [facility] ENDOSCOPY;  Service: Pulmonary;  Laterality: N/A;    COLON SURGERY      CORONARY ANGIOPLASTY      greater than 5 years ago from      2023    HYSTERECTOMY      MASTECTOMY       /69 (BP Location: Right arm, Patient Position: Sitting, Cuff Size: Adult)   Pulse 59   Ht 157.5 cm (62\")   Wt 64 kg (141 lb)   SpO2 97%   BMI 25.79 kg/m²   Family History   Problem Relation Age of Onset    Heart disease Mother     Heart disease Father     Colon cancer Sister     Heart disease Sister        Current Outpatient Medications:     Advair HFA " 45-21 MCG/ACT inhaler, , Disp: , Rfl:     albuterol sulfate  (90 Base) MCG/ACT inhaler, , Disp: , Rfl:     aspirin 81 MG tablet, Take 1 tablet by mouth Daily., Disp: , Rfl:     Calcium Carbonate Antacid (Antacid Soft Chews) 1177 MG chewable tablet, Chew., Disp: , Rfl:     cetirizine (zyrTEC) 10 MG tablet, Take 1 tablet by mouth Daily., Disp: , Rfl:     Cholecalciferol (Vitamin D3) 50 MCG (2000 UT) capsule, Take 1 capsule by mouth 2 (Two) Times a Day. Take 2 capsules daily, Disp: , Rfl:     citalopram (CeleXA) 10 MG tablet, Take 1 tablet by mouth Daily., Disp: , Rfl:     Cyanocobalamin 1000 MCG/ML kit, Inject  as directed Every 30 (Thirty) Days., Disp: , Rfl:     donepezil (ARICEPT) 10 MG tablet, Take 1 tablet by mouth Every Evening., Disp: 90 tablet, Rfl: 3    memantine (NAMENDA) 10 MG tablet, One per day for one month then one bid, Disp: 180 tablet, Rfl: 3    metoprolol tartrate (LOPRESSOR) 25 MG tablet, TAKE 1 TABLET BY MOUTH EVERY DAY, Disp: 90 tablet, Rfl: 2    NON FORMULARY, Take 330 mg by mouth 2 (Two) Times a Day. [facility], Disp: , Rfl:     omeprazole (priLOSEC) 40 MG capsule, Take 1 capsule by mouth Daily., Disp: , Rfl:     oxybutynin XL (DITROPAN-XL) 5 MG 24 hr tablet, Take 1 tablet by mouth Daily., Disp: , Rfl: 5    rosuvastatin (CRESTOR) 20 MG tablet, Take 1 tablet by mouth Daily., Disp: , Rfl:     memantine (NAMENDA) 5 MG tablet, Take 1 tablet by mouth Daily for 30 days., Disp: 30 tablet, Rfl: 0  Allergies   Allergen Reactions    Contrast Dye (Echo Or Unknown Ct/Mr) Unknown (See Comments)    Hydrocodone-Acetaminophen Unknown (See Comments)    Penicillin G Unknown (See Comments)     Social History     Socioeconomic History    Marital status:    Tobacco Use    Smoking status: Former     Current packs/day: 0.00     Average packs/day: 1 pack/day for 15.0 years (15.0 ttl pk-yrs)     Types: Cigarettes     Quit date: 2004     Years since quittin.7     Passive exposure:  Never    Smokeless tobacco: Never   Vaping Use    Vaping status: Never Used   Substance and Sexual Activity    Alcohol use: Not Currently    Drug use: Never    Sexual activity: Not Currently     Partners: Male     Birth control/protection: None     Review of Systems   Constitutional: Negative for malaise/fatigue.   Cardiovascular:  Negative for chest pain, dyspnea on exertion, leg swelling and palpitations.   Respiratory:  Negative for cough and shortness of breath.    Gastrointestinal:  Negative for abdominal pain, nausea and vomiting.   Neurological:  Negative for dizziness, headaches, light-headedness, numbness and weakness.   All other systems reviewed and are negative.             Objective:     Constitutional:       Appearance: Well-developed.   Eyes:      General: No scleral icterus.     Conjunctiva/sclera: Conjunctivae normal.   HENT:      Head: Normocephalic and atraumatic.   Neck:      Vascular: No carotid bruit or JVD.   Pulmonary:      Effort: Pulmonary effort is normal.      Breath sounds: Normal breath sounds. No wheezing. No rales.   Cardiovascular:      Normal rate. Regular rhythm.   Pulses:     Intact distal pulses.   Abdominal:      General: Bowel sounds are normal.      Palpations: Abdomen is soft.   Musculoskeletal:      Cervical back: Normal range of motion and neck supple. Skin:     General: Skin is warm and dry.      Findings: No rash.   Neurological:      Mental Status: Alert.       Procedures    Lab Review:         MDM  #1 coronary artery disease  Patient has nonobstructive disease and is currently stable without any angina and on adequate medical therapy at this time.  2.  HFrEF  Patient has mild LV systolic dysfunction with EF of 40% but will have an echocardiogram because it has been 4 years since she has been tested she is on beta-blockers at this time.  3.  Hyperlipidemia  Patient on Crestor the lipid numbers are well within normal limits  4.  Hypertension  Patient blood pressure  currently stable on medications including metoprolol.      Patient's previous medical records, labs, and EKG were reviewed and discussed with the patient at today's visit.

## 2025-07-24 NOTE — PROGRESS NOTES
Hematology/Oncology Outpatient Follow Up    PATIENT NAME:Goldie Mata  :1940  MRN: [id number]  PRIMARY CARE PHYSICIAN: Olman Hatch PA-C  REFERRING PHYSICIAN: Olman Hatch PA-C    Chief Complaint   Patient presents with    Follow-up     Malignant neoplasm of upper lobe of left lung              HISTORY OF PRESENT ILLNESS:       Transfer from Dr. 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  She received his carboplatinum and Taxotere for total of 6 cycles      she had radiation treatment to the chest from 2009 to 10/12/2009     2010 following left supraclavicular relapse patient received chemotherapy with cisplatin and Navelbin.  Not sure if she received XRT at that time     She developed left breast cancer back in  for which she had mastectomy followed by [facility] chemotherapy for 6 months.  There was no hormonal therapy or radiation treatment recommended     In  she developed colonic cancer.  Her last surveillance colonoscopy was in 2019     Patient is due to have a colonoscopy in            Patient  used to smoke   She does not drink alcohol  She lives alone and able to carry out ADLs  She was exposed to chemicals        Family history of cancer with 2 sisters with ovarian and brain cancer in their 60s    May 6, 2024 patient had CT scan of the chest which basically showed slightly larger now completely solid segment left lower lobe pulmonary nodule stable at the 7 smaller nodule.  2024 patient had a PET CT scan to further evaluate the enlarging left lower lobe nodule.   This showed increased metabolic activity in the 1.4 cm nodule located in the superior segment of the left lower lobe SUV 4.5 cc concerning for malignancy and biopsy has been recommended.  There is a small cavitary lesion in the posterior basilar segments of the left lower lobe measuring 1 cm may be scar tissue atelectasis or malignant.  SUV was 3.64 infrarenal abdominal aortic aneurysm measuring 3.8 cm slightly increased from previous PET scan where was 3.5 cm  Patient was referred to Dr. Camacho.  She is declining biopsy of the left lung nodule  Patient completed SRS to lung mass August 2024.  She is scheduled for follow-up CT chest in October 2024  Status post SRS to an enlarging left lower lobe nodule December 2024: Dr. Talamantes  Past Medical History:   Diagnosis Date    Breast cancer     CHF (congestive heart failure)     Chronic kidney disease     Colon cancer     Congenital heart disease     COPD (chronic obstructive pulmonary disease)     Coronary artery disease     Dementia     Hyperlipidemia     Hypertension     Lung cancer     Memory loss     Myocardial infarction        Past Surgical History:   Procedure Laterality Date    BRONCHOSCOPY N/A 07/21/2023    Procedure: BRONCHOSCOPY WITH BRONCHOALVEOLAR LAVAGE AND ENDOBRONCHIAL ULTRASOUND WITH FINE NEEDLE ASPIRATION;  Surgeon: Carolyn Sierra MD;  Location: [facility] ENDOSCOPY;  Service: Pulmonary;  Laterality: N/A;    COLON SURGERY      CORONARY ANGIOPLASTY      greater than 5 years ago from      2023    HYSTERECTOMY      MASTECTOMY           Current Outpatient Medications:     levothyroxine (SYNTHROID, LEVOTHROID) 25 MCG tablet, , Disp: , Rfl:     Advair HFA 45-21 MCG/ACT inhaler, , Disp: , Rfl:     albuterol sulfate  (90 Base) MCG/ACT inhaler, , Disp: , Rfl:     aspirin 81 MG tablet, Take 1 tablet by mouth Daily., Disp: , Rfl:     Calcium Carbonate Antacid (Antacid Soft Chews) 1177 MG chewable tablet, Chew., Disp: , Rfl:     cetirizine (zyrTEC) 10 MG tablet,  Take 1 tablet by mouth Daily., Disp: , Rfl:     Cholecalciferol (Vitamin D3) 50 MCG (2000 UT) capsule, Take 1 capsule by mouth 2 (Two) Times a Day. Take 2 capsules daily, Disp: , Rfl:     citalopram (CeleXA) 10 MG tablet, Take 1 tablet by mouth Daily., Disp: , Rfl:     Cyanocobalamin 1000 MCG/ML kit, Inject  as directed Every 30 (Thirty) Days., Disp: , Rfl:     donepezil (ARICEPT) 10 MG tablet, Take 1 tablet by mouth Every Evening., Disp: 90 tablet, Rfl: 3    memantine (NAMENDA) 10 MG tablet, One per day for one month then one bid, Disp: 180 tablet, Rfl: 3    memantine (NAMENDA) 5 MG tablet, Take 1 tablet by mouth Daily for 30 days., Disp: 30 tablet, Rfl: 0    metoprolol tartrate (LOPRESSOR) 25 MG tablet, TAKE 1 TABLET BY MOUTH EVERY DAY, Disp: 90 tablet, Rfl: 2    NON FORMULARY, Take 330 mg by mouth 2 (Two) Times a Day. [facility] - HERBAL SUPPORT, Disp: , Rfl:     omeprazole (priLOSEC) 40 MG capsule, Take 1 capsule by mouth Daily., Disp: , Rfl:     oxybutynin XL (DITROPAN-XL) 5 MG 24 hr tablet, Take 1 tablet by mouth Daily., Disp: , Rfl: 5    rosuvastatin (CRESTOR) 20 MG tablet, Take 1 tablet by mouth Daily., Disp: , Rfl:   No current facility-administered medications for this visit.    Facility-Administered Medications Ordered in Other Visits:     heparin injection 500 Units, 500 Units, Intravenous, PRPetros PERSON Ifeoma Roseline, MD, 500 Units at 07/28/25 0749    sodium chloride 0.9 % flush 20 mL, 20 mL, Intravenous, PRNPetros Ifeoma Roseline, MD, 10 mL at 07/28/25 0749    Allergies   Allergen Reactions    Contrast Dye (Echo Or Unknown Ct/Mr) Unknown (See Comments)    Hydrocodone-Acetaminophen Unknown (See Comments)    Penicillin G Unknown (See Comments)       Family History   Problem Relation Age of Onset    Heart disease Mother     Heart disease Father     Colon cancer Sister     Heart disease Sister        Cancer-related family history includes Colon cancer in her sister.    Social History     Tobacco Use     Smoking status: Former     Current packs/day: 0.00     Average packs/day: 1 pack/day for 15.0 years (15.0 ttl pk-yrs)     Types: Cigarettes     Quit date: 2004     Years since quittin.7     Passive exposure: Never    Smokeless tobacco: Never   Vaping Use    Vaping status: Never Used   Substance Use Topics    Alcohol use: Not Currently    Drug use: Never       I have reviewed and confirmed the accuracy of the patient's history: Chief complaint, HPI, ROS, and Subjective as entered by the MA/LPN/RN. Emelyn Delilah Morrell MD 25       SUBJECTIVE:    Denies any new issues today.  Her port is intact.    She is here today for follow-up denies any new issues.  She has CT scans coming up reviewed her CT scans from May 2025.  She does not have any pulmonary symptoms.  Dr. Talamantes ordering her CT scans.    REVIEW OF SYSTEMS:  Review of Systems   Constitutional:  Negative for chills, fatigue and fever.   HENT:  Negative for congestion, drooling, ear discharge, rhinorrhea, sinus pressure and tinnitus.    Eyes:  Negative for photophobia, pain and discharge.   Respiratory:  Negative for apnea, choking and stridor.    Cardiovascular:  Negative for palpitations.   Gastrointestinal:  Negative for abdominal distention, abdominal pain and anal bleeding.   Endocrine: Negative for polydipsia and polyphagia.   Genitourinary:  Negative for decreased urine volume, flank pain and genital sores.   Musculoskeletal:  Negative for gait problem, neck pain and neck stiffness.   Skin:  Negative for color change, rash and wound.   Neurological:  Negative for tremors, seizures, syncope, facial asymmetry and speech difficulty.   Hematological:  Negative for adenopathy.   Psychiatric/Behavioral:  Negative for agitation, confusion, hallucinations and self-injury. The patient is not hyperactive.        Negative    OBJECTIVE:    Vitals:    25 0829   BP: 133/72   Pulse: 72   Resp: 18   Temp: 97.6 °F (36.4 °C)   TempSrc: Temporal   SpO2:  "92%   Weight: 62.6 kg (138 lb)   Height: 157.5 cm (62\")   PainSc: 0-No pain               Body mass index is 25.24 kg/m².    ECOG  (1) Restricted in physically strenuous activity, ambulatory and able to do work of light nature    Physical Exam  Vitals and nursing note reviewed.   Constitutional:       General: She is not in acute distress.     Appearance: She is not diaphoretic.   HENT:      Head: Normocephalic and atraumatic.   Eyes:      General: No scleral icterus.        Right eye: No discharge.         Left eye: No discharge.      Conjunctiva/sclera: Conjunctivae normal.   Neck:      Thyroid: No thyromegaly.   Cardiovascular:      Rate and Rhythm: Normal rate and regular rhythm.      Heart sounds: Normal heart sounds.      No friction rub. No gallop.   Pulmonary:      Effort: Pulmonary effort is normal. No respiratory distress.      Breath sounds: No stridor. No wheezing.   Abdominal:      General: Bowel sounds are normal.      Palpations: Abdomen is soft. There is no mass.      Tenderness: There is no abdominal tenderness. There is no guarding or rebound.   Musculoskeletal:         General: No tenderness. Normal range of motion.      Cervical back: Normal range of motion and neck supple.   Lymphadenopathy:      Cervical: No cervical adenopathy.   Skin:     General: Skin is warm.      Findings: No erythema or rash.   Neurological:      Mental Status: She is alert and oriented to person, place, and time.      Motor: No abnormal muscle tone.   Psychiatric:         Behavior: Behavior normal.     I have reexamined the patient and the results are consistent with the previously documented exam. Emelyn Morrell MD     RECENT LABS    WBC   Date Value Ref Range Status   07/28/2025 4.92 3.40 - 10.80 10*3/mm3 Final     RBC   Date Value Ref Range Status   07/28/2025 4.41 3.77 - 5.28 10*6/mm3 Final     Hemoglobin   Date Value Ref Range Status   07/28/2025 13.0 12.0 - 15.9 g/dL Final     Hematocrit   Date Value Ref " Range Status   07/28/2025 40.6 34.0 - 46.6 % Final     MCV   Date Value Ref Range Status   07/28/2025 92.1 79.0 - 97.0 fL Final     MCH   Date Value Ref Range Status   07/28/2025 29.5 26.6 - 33.0 pg Final     MCHC   Date Value Ref Range Status   07/28/2025 32.0 31.5 - 35.7 g/dL Final     RDW   Date Value Ref Range Status   07/28/2025 13.1 12.3 - 15.4 % Final     RDW-SD   Date Value Ref Range Status   07/28/2025 44.7 37.0 - 54.0 fl Final     MPV   Date Value Ref Range Status   07/28/2025 10.0 6.0 - 12.0 fL Final     Platelets   Date Value Ref Range Status   07/28/2025 195 140 - 450 10*3/mm3 Final     Neutrophil %   Date Value Ref Range Status   07/28/2025 68.0 42.7 - 76.0 % Final     Lymphocyte %   Date Value Ref Range Status   07/28/2025 18.1 (L) 19.6 - 45.3 % Final     Monocyte %   Date Value Ref Range Status   07/28/2025 9.6 5.0 - 12.0 % Final     Eosinophil %   Date Value Ref Range Status   07/28/2025 3.7 0.3 - 6.2 % Final     Basophil %   Date Value Ref Range Status   07/28/2025 0.6 0.0 - 1.5 % Final     Immature Grans %   Date Value Ref Range Status   07/28/2025 0.0 0.0 - 0.5 % Final     Neutrophils, Absolute   Date Value Ref Range Status   07/28/2025 3.35 1.70 - 7.00 10*3/mm3 Final     Lymphocytes, Absolute   Date Value Ref Range Status   07/28/2025 0.89 0.70 - 3.10 10*3/mm3 Final     Monocytes, Absolute   Date Value Ref Range Status   07/28/2025 0.47 0.10 - 0.90 10*3/mm3 Final     Eosinophils, Absolute   Date Value Ref Range Status   07/28/2025 0.18 0.00 - 0.40 10*3/mm3 Final     Basophils, Absolute   Date Value Ref Range Status   07/28/2025 0.03 0.00 - 0.20 10*3/mm3 Final     Immature Grans, Absolute   Date Value Ref Range Status   07/28/2025 0.00 0.00 - 0.05 10*3/mm3 Final     nRBC   Date Value Ref Range Status   07/21/2023 0.0 0.0 - 0.2 /100 WBC Final       Lab Results   Component Value Date    GLUCOSE 119 (H) 09/05/2021    BUN 15 09/05/2021    CREATININE 1.09 (H) 09/05/2021    EGFRIFNONA 48 (L) 09/05/2021     BCR 13.8 09/05/2021    K 4.0 09/05/2021    CO2 26.0 09/05/2021    CALCIUM 9.0 09/05/2021    ALBUMIN 3.5 01/07/2019    AST 22 01/07/2019    ALT 11 (L) 01/07/2019         Assessment & Plan     Malignant neoplasm of upper lobe of left lung  - CBC & Differential            ASSESSMENT:    Malignant neoplasm of upper lobe of left lung  - CBC & Differential        Enlarging left lower lobe pulmonary nodule measuring 1.4 cm, PET avid suspicious for malignancy.  Small cavitary lesion posterior basilar segment of the left lower lobe 1 cm may be malignant inflammatory or infection. Per radiologist stable. Pet has been ordered by Dr Talamantes. Status post SRS to left lower lung nodule December 2024.  Continue surveillance CT scans  Patient declines biopsy of the nodule  Status post SRS to lung masses.  Continue surveillance imaging.  These are ordered by Dr. Talamantes  Enlarging aneurysm: Refer to Dr. Andrade.  She was referred to vascular.  Patient will follow-up with Dr. Andrade.  Reviewed  History of non small cell lung cancer stage IIIb approximately 2009  Breast cancer in 1989 status post left mastectomy no adjuvant chemotherapy or radiation recommended  Colon cancer status post colon resection followed by chemotherapy in her 40s: 1988  Family history of cancer as listed above.  She was given information to review on cancer genetics and also educated on importance of testing.  At this time patient is not sure she wants to have this test completed  Port maintenance           Plans      Continue monthly port flushes, fu in 4 months         Continue surveillance CT scans ordered by Dr Talamantes    Labs reviewed      Follow-up with radiation oncologist Dr. Talamantes        Schedule patient for unilateral right screening mammogram which was completed 6/28/2024 normal    FU in  4 months        Electronically signed by Emelyn Morrell MD, 07/28/25, 10:14 AM EDT.

## 2025-07-28 ENCOUNTER — OFFICE VISIT (OUTPATIENT)
Dept: ONCOLOGY | Facility: CLINIC | Age: 85
End: 2025-07-28
Payer: MEDICARE

## 2025-07-28 ENCOUNTER — HOSPITAL ENCOUNTER (OUTPATIENT)
Dept: ONCOLOGY | Facility: HOSPITAL | Age: 85
Discharge: HOME | End: 2025-07-28
Admitting: INTERNAL MEDICINE
Payer: MEDICARE

## 2025-07-28 VITALS
SYSTOLIC BLOOD PRESSURE: 133 MMHG | OXYGEN SATURATION: 92 % | DIASTOLIC BLOOD PRESSURE: 72 MMHG | HEART RATE: 72 BPM | TEMPERATURE: 97.6 F | WEIGHT: 138 LBS | BODY MASS INDEX: 25.4 KG/M2 | HEIGHT: 62 IN | RESPIRATION RATE: 18 BRPM

## 2025-07-28 DIAGNOSIS — Z45.2 ENCOUNTER FOR CARE RELATED TO PORT-A-CATH: Primary | ICD-10-CM

## 2025-07-28 DIAGNOSIS — C34.12 MALIGNANT NEOPLASM OF UPPER LOBE OF LEFT LUNG: ICD-10-CM

## 2025-07-28 DIAGNOSIS — C34.12 MALIGNANT NEOPLASM OF UPPER LOBE OF LEFT LUNG: Primary | ICD-10-CM

## 2025-07-28 PROBLEM — H04.129 TEAR FILM INSUFFICIENCY: Status: ACTIVE | Noted: 2025-06-30

## 2025-07-28 PROBLEM — H52.4 PRESBYOPIA: Status: ACTIVE | Noted: 2025-06-30

## 2025-07-28 PROBLEM — H43.819 POSTERIOR VITREOUS DETACHMENT: Status: ACTIVE | Noted: 2025-06-30

## 2025-07-28 PROBLEM — H25.819 COMBINED FORM OF SENILE CATARACT: Status: ACTIVE | Noted: 2018-10-22

## 2025-07-28 PROBLEM — H35.363 DRUSEN OF MACULA OF BOTH EYES: Status: ACTIVE | Noted: 2025-06-30

## 2025-07-28 PROBLEM — Z96.1 PSEUDOPHAKIA OF RIGHT EYE: Status: ACTIVE | Noted: 2018-11-09

## 2025-07-28 LAB
BASOPHILS # BLD AUTO: 0.03 10*3/MM3 (ref 0–0.2)
BASOPHILS NFR BLD AUTO: 0.6 % (ref 0–1.5)
DEPRECATED RDW RBC AUTO: 44.7 FL (ref 37–54)
EOSINOPHIL # BLD AUTO: 0.18 10*3/MM3 (ref 0–0.4)
EOSINOPHIL NFR BLD AUTO: 3.7 % (ref 0.3–6.2)
ERYTHROCYTE [DISTWIDTH] IN BLOOD BY AUTOMATED COUNT: 13.1 % (ref 12.3–15.4)
HCT VFR BLD AUTO: 40.6 % (ref 34–46.6)
HGB BLD-MCNC: 13 G/DL (ref 12–15.9)
IMM GRANULOCYTES # BLD AUTO: 0 10*3/MM3 (ref 0–0.05)
IMM GRANULOCYTES NFR BLD AUTO: 0 % (ref 0–0.5)
LYMPHOCYTES # BLD AUTO: 0.89 10*3/MM3 (ref 0.7–3.1)
LYMPHOCYTES NFR BLD AUTO: 18.1 % (ref 19.6–45.3)
MCH RBC QN AUTO: 29.5 PG (ref 26.6–33)
MCHC RBC AUTO-ENTMCNC: 32 G/DL (ref 31.5–35.7)
MCV RBC AUTO: 92.1 FL (ref 79–97)
MONOCYTES # BLD AUTO: 0.47 10*3/MM3 (ref 0.1–0.9)
MONOCYTES NFR BLD AUTO: 9.6 % (ref 5–12)
NEUTROPHILS NFR BLD AUTO: 3.35 10*3/MM3 (ref 1.7–7)
NEUTROPHILS NFR BLD AUTO: 68 % (ref 42.7–76)
PLATELET # BLD AUTO: 195 10*3/MM3 (ref 140–450)
PMV BLD AUTO: 10 FL (ref 6–12)
RBC # BLD AUTO: 4.41 10*6/MM3 (ref 3.77–5.28)
WBC NRBC COR # BLD AUTO: 4.92 10*3/MM3 (ref 3.4–10.8)

## 2025-07-28 PROCEDURE — 85025 COMPLETE CBC W/AUTO DIFF WBC: CPT | Performed by: INTERNAL MEDICINE

## 2025-07-28 PROCEDURE — 99214 OFFICE O/P EST MOD 30 MIN: CPT | Performed by: INTERNAL MEDICINE

## 2025-07-28 PROCEDURE — 1126F AMNT PAIN NOTED NONE PRSNT: CPT | Performed by: INTERNAL MEDICINE

## 2025-07-28 PROCEDURE — 25010000002 HEPARIN LOCK FLUSH PER 10 UNITS: Performed by: INTERNAL MEDICINE

## 2025-07-28 PROCEDURE — 36591 DRAW BLOOD OFF VENOUS DEVICE: CPT

## 2025-07-28 RX ORDER — SODIUM CHLORIDE 0.9 % (FLUSH) 0.9 %
20 SYRINGE (ML) INJECTION AS NEEDED
OUTPATIENT
Start: 2025-07-28

## 2025-07-28 RX ORDER — SODIUM CHLORIDE 0.9 % (FLUSH) 0.9 %
20 SYRINGE (ML) INJECTION AS NEEDED
Status: DISCONTINUED | OUTPATIENT
Start: 2025-07-28 | End: 2025-07-29 | Stop reason: HOSPADM

## 2025-07-28 RX ORDER — HEPARIN SODIUM (PORCINE) LOCK FLUSH IV SOLN 100 UNIT/ML 100 UNIT/ML
500 SOLUTION INTRAVENOUS AS NEEDED
Status: DISCONTINUED | OUTPATIENT
Start: 2025-07-28 | End: 2025-07-29 | Stop reason: HOSPADM

## 2025-07-28 RX ORDER — LEVOTHYROXINE SODIUM 25 UG/1
TABLET ORAL
COMMUNITY
Start: 2025-07-22

## 2025-07-28 RX ORDER — HEPARIN SODIUM (PORCINE) LOCK FLUSH IV SOLN 100 UNIT/ML 100 UNIT/ML
500 SOLUTION INTRAVENOUS AS NEEDED
OUTPATIENT
Start: 2025-07-28

## 2025-07-28 RX ADMIN — HEPARIN 500 UNITS: 100 SYRINGE at 07:49

## 2025-07-28 RX ADMIN — Medication 10 ML: at 07:49

## 2025-07-28 NOTE — PROGRESS NOTES
Port accessed and flushed with good blood return noted. 10cc of blood wasted prior to specimen collection. Blood specimen obtained and sent to lab for processing per protocol.  Port flushed with saline and heparin prior to needle removal.  Extra tubes drawn, patient sent to [facility] to await Md visit.

## 2025-08-25 ENCOUNTER — HOSPITAL ENCOUNTER (OUTPATIENT)
Dept: ONCOLOGY | Facility: HOSPITAL | Age: 85
Discharge: HOME | End: 2025-08-25
Admitting: INTERNAL MEDICINE
Payer: MEDICARE

## 2025-08-25 DIAGNOSIS — Z45.2 ENCOUNTER FOR CARE RELATED TO PORT-A-CATH: Primary | ICD-10-CM

## 2025-08-25 PROCEDURE — 25010000002 HEPARIN LOCK FLUSH PER 10 UNITS: Performed by: INTERNAL MEDICINE

## 2025-08-25 PROCEDURE — 96523 IRRIG DRUG DELIVERY DEVICE: CPT

## 2025-08-25 RX ORDER — HEPARIN SODIUM (PORCINE) LOCK FLUSH IV SOLN 100 UNIT/ML 100 UNIT/ML
500 SOLUTION INTRAVENOUS AS NEEDED
OUTPATIENT
Start: 2025-08-25

## 2025-08-25 RX ORDER — HEPARIN SODIUM (PORCINE) LOCK FLUSH IV SOLN 100 UNIT/ML 100 UNIT/ML
500 SOLUTION INTRAVENOUS AS NEEDED
Status: DISCONTINUED | OUTPATIENT
Start: 2025-08-25 | End: 2025-08-26 | Stop reason: HOSPADM

## 2025-08-25 RX ORDER — SODIUM CHLORIDE 0.9 % (FLUSH) 0.9 %
20 SYRINGE (ML) INJECTION AS NEEDED
OUTPATIENT
Start: 2025-08-25

## 2025-08-25 RX ORDER — SODIUM CHLORIDE 0.9 % (FLUSH) 0.9 %
20 SYRINGE (ML) INJECTION AS NEEDED
Status: DISCONTINUED | OUTPATIENT
Start: 2025-08-25 | End: 2025-08-26 | Stop reason: HOSPADM

## 2025-08-25 RX ADMIN — Medication 20 ML: at 07:57

## 2025-08-25 RX ADMIN — HEPARIN 500 UNITS: 100 SYRINGE at 07:57

## (undated) DEVICE — Device: Brand: SINGLE USE ASPIRATION NEEDLE NA-U401SX

## (undated) DEVICE — BAPTIST FLOYD BRONCHOSCOPY: Brand: MEDLINE INDUSTRIES, INC.

## (undated) DEVICE — PRESSURE MONITORING ACCESSORY: Brand: TRUWAVE

## (undated) DEVICE — PRESSURE TUBING: Brand: TRUWAVE

## (undated) DEVICE — Device: Brand: BALLOON